# Patient Record
Sex: FEMALE | Race: BLACK OR AFRICAN AMERICAN | NOT HISPANIC OR LATINO | Employment: FULL TIME | URBAN - METROPOLITAN AREA
[De-identification: names, ages, dates, MRNs, and addresses within clinical notes are randomized per-mention and may not be internally consistent; named-entity substitution may affect disease eponyms.]

---
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
## 2018-12-06 ENCOUNTER — PRENATAL OFFICE VISIT (OUTPATIENT)
Dept: NURSING | Facility: CLINIC | Age: 18
End: 2018-12-06
Payer: COMMERCIAL

## 2018-12-06 VITALS
HEART RATE: 97 BPM | TEMPERATURE: 98 F | SYSTOLIC BLOOD PRESSURE: 130 MMHG | HEIGHT: 66 IN | DIASTOLIC BLOOD PRESSURE: 90 MMHG | BODY MASS INDEX: 47.09 KG/M2 | WEIGHT: 293 LBS

## 2018-12-06 DIAGNOSIS — Z34.90 SUPERVISION OF NORMAL PREGNANCY: Primary | ICD-10-CM

## 2018-12-06 DIAGNOSIS — Z23 NEED FOR TDAP VACCINATION: ICD-10-CM

## 2018-12-06 PROBLEM — Z59.00 HOMELESS: Status: RESOLVED | Noted: 2018-10-22 | Resolved: 2018-12-06

## 2018-12-06 PROBLEM — Z59.00 HOMELESS: Status: ACTIVE | Noted: 2018-10-22

## 2018-12-06 LAB
ABO + RH BLD: NORMAL
ABO + RH BLD: NORMAL
ALBUMIN UR-MCNC: NEGATIVE MG/DL
APPEARANCE UR: CLEAR
BILIRUB UR QL STRIP: NEGATIVE
BLD GP AB SCN SERPL QL: NORMAL
BLOOD BANK CMNT PATIENT-IMP: NORMAL
COLOR UR AUTO: YELLOW
ERYTHROCYTE [DISTWIDTH] IN BLOOD BY AUTOMATED COUNT: 14.9 % (ref 10–15)
GLUCOSE UR STRIP-MCNC: NEGATIVE MG/DL
HCT VFR BLD AUTO: 37.7 % (ref 35–47)
HGB BLD-MCNC: 12.8 G/DL (ref 11.7–15.7)
HGB UR QL STRIP: NEGATIVE
KETONES UR STRIP-MCNC: NEGATIVE MG/DL
LEUKOCYTE ESTERASE UR QL STRIP: NEGATIVE
MCH RBC QN AUTO: 26.4 PG (ref 26.5–33)
MCHC RBC AUTO-ENTMCNC: 34 G/DL (ref 31.5–36.5)
MCV RBC AUTO: 78 FL (ref 78–100)
NITRATE UR QL: NEGATIVE
PH UR STRIP: 5.5 PH (ref 5–7)
PLATELET # BLD AUTO: 277 10E9/L (ref 150–450)
RBC # BLD AUTO: 4.85 10E12/L (ref 3.8–5.2)
SOURCE: NORMAL
SP GR UR STRIP: <=1.005 (ref 1–1.03)
SPECIMEN EXP DATE BLD: NORMAL
UROBILINOGEN UR STRIP-ACNC: 0.2 EU/DL (ref 0.2–1)
WBC # BLD AUTO: 13 10E9/L (ref 4–11)

## 2018-12-06 PROCEDURE — 99207 ZZC NO CHARGE NURSE ONLY: CPT

## 2018-12-06 PROCEDURE — 87340 HEPATITIS B SURFACE AG IA: CPT | Performed by: ADVANCED PRACTICE MIDWIFE

## 2018-12-06 PROCEDURE — 87389 HIV-1 AG W/HIV-1&-2 AB AG IA: CPT | Performed by: ADVANCED PRACTICE MIDWIFE

## 2018-12-06 PROCEDURE — 86780 TREPONEMA PALLIDUM: CPT | Performed by: ADVANCED PRACTICE MIDWIFE

## 2018-12-06 PROCEDURE — 86901 BLOOD TYPING SEROLOGIC RH(D): CPT | Performed by: ADVANCED PRACTICE MIDWIFE

## 2018-12-06 PROCEDURE — 83021 HEMOGLOBIN CHROMOTOGRAPHY: CPT | Mod: 90 | Performed by: ADVANCED PRACTICE MIDWIFE

## 2018-12-06 PROCEDURE — 81003 URINALYSIS AUTO W/O SCOPE: CPT | Performed by: ADVANCED PRACTICE MIDWIFE

## 2018-12-06 PROCEDURE — 86762 RUBELLA ANTIBODY: CPT | Performed by: ADVANCED PRACTICE MIDWIFE

## 2018-12-06 PROCEDURE — 36415 COLL VENOUS BLD VENIPUNCTURE: CPT | Performed by: ADVANCED PRACTICE MIDWIFE

## 2018-12-06 PROCEDURE — 99000 SPECIMEN HANDLING OFFICE-LAB: CPT | Performed by: ADVANCED PRACTICE MIDWIFE

## 2018-12-06 PROCEDURE — 85027 COMPLETE CBC AUTOMATED: CPT | Performed by: ADVANCED PRACTICE MIDWIFE

## 2018-12-06 PROCEDURE — 86850 RBC ANTIBODY SCREEN: CPT | Performed by: ADVANCED PRACTICE MIDWIFE

## 2018-12-06 PROCEDURE — 86900 BLOOD TYPING SEROLOGIC ABO: CPT | Performed by: ADVANCED PRACTICE MIDWIFE

## 2018-12-06 PROCEDURE — 87086 URINE CULTURE/COLONY COUNT: CPT | Performed by: ADVANCED PRACTICE MIDWIFE

## 2018-12-06 RX ORDER — ALBUTEROL SULFATE 90 UG/1
2 AEROSOL, METERED RESPIRATORY (INHALATION)
COMMUNITY
Start: 2018-11-01 | End: 2022-07-14

## 2018-12-06 RX ORDER — VITAMIN A ACETATE, BETA CAROTENE, ASCORBIC ACID, CHOLECALCIFEROL, .ALPHA.-TOCOPHEROL ACETATE, DL-, THIAMINE MONONITRATE, RIBOFLAVIN, NIACINAMIDE, PYRIDOXINE HYDROCHLORIDE, FOLIC ACID, CYANOCOBALAMIN, CALCIUM CARBONATE, FERROUS FUMARATE, ZINC OXIDE, CUPRIC OXIDE 3080; 12; 120; 400; 1; 1.84; 3; 20; 22; 920; 25; 200; 27; 10; 2 [IU]/1; UG/1; MG/1; [IU]/1; MG/1; MG/1; MG/1; MG/1; MG/1; [IU]/1; MG/1; MG/1; MG/1; MG/1; MG/1
1 TABLET, FILM COATED ORAL
Status: ON HOLD | COMMUNITY
Start: 2018-10-22 | End: 2022-07-14

## 2018-12-06 RX ORDER — METRONIDAZOLE 7.5 MG/G
GEL VAGINAL
COMMUNITY
Start: 2018-11-23 | End: 2018-12-06

## 2018-12-06 RX ORDER — ACETAMINOPHEN 325 MG/1
650 TABLET ORAL
Status: ON HOLD | COMMUNITY
Start: 2018-11-01 | End: 2022-07-14

## 2018-12-06 RX ORDER — ECHINACEA PURPUREA EXTRACT 125 MG
1 TABLET ORAL
Status: ON HOLD | COMMUNITY
Start: 2018-11-01 | End: 2022-07-14

## 2018-12-06 NOTE — MR AVS SNAPSHOT
"              After Visit Summary   12/6/2018    Robinson Myers    MRN: [id number]           Patient Information     Date Of Birth          2000        Visit Information        Provider Department      12/6/2018 1:45 PM RD OB NURSE EDUCATION [facility]        Today's Diagnoses     Supervision of normal pregnancy    -  1    Need for Tdap vaccination           Follow-ups after your visit        Your next 10 appointments already scheduled     Dec 10, 2018  1:00 PM CST   New Prenatal with JARRED Alvarado CNM   [facility] [place]    [address] [place] [id number] [phone]              Who to contact     If you have questions or need follow up information about today's clinic visit or your schedule please contact [facility] directly at [phone].  Normal or non-critical lab and imaging results will be communicated to you by [employer], letter or phone within 4 business days after the clinic has received the results. If you do not hear from us within 7 days, please contact the clinic through [employer] or phone. If you have a critical or abnormal lab result, we will notify you by phone as soon as possible.  Submit refill requests through [employer] or call your pharmacy and they will forward the refill request to us. Please allow 3 business days for your refill to be completed.          Additional Information About Your Visit        MyChart Information     [employer] lets you send messages to your doctor, view your test results, renew your prescriptions, schedule appointments and more. To sign up, go to [email or web address] . Click on \"Log in\" on the left side of the screen, which will take you to the Welcome page. Then click on \"Sign up Now\" on the right side of the page.     You will be asked to enter the access code listed below, as well as some personal information. Please follow the directions to create " "your username and password.     Your access code is: [email or web address]  Expires: 3/6/2019  1:34 PM     Your access code will  in 90 days. If you need help or a new code, please call your [place] clinic or [phone].        Care EveryWhere ID     This is your Care EveryWhere ID. This could be used by other organizations to access your [place] medical records  [id number]        Your Vitals Were     Pulse Temperature Height Last Period BMI (Body Mass Index)       97 98  F (36.7  C) 5' 6\" (1.676 m) 2018 51.97 kg/m2        Blood Pressure from Last 3 Encounters:   18 130/90   10/24/17 135/82    Weight from Last 3 Encounters:   18 322 lb (146.1 kg)   10/24/17 (!) 332 lb (150.6 kg) (>99 %)*     * Growth percentiles are based on CDC 2-20 Years data.              We Performed the Following     ABO/Rh type and screen     CBC with platelets     Hepatitis B surface antigen     HGB Eval Reflex to ELP or RBC Solubility     HIV Antigen Antibody Combo     Rubella Antibody IgG Quantitative     Treponema Abs w Reflex to RPR and Titer     UA without Microscopic     Urine Culture Aerobic Bacterial          Today's Medication Changes          These changes are accurate as of 18  3:08 PM.  If you have any questions, ask your nurse or doctor.               Stop taking these medicines if you haven't already. Please contact your care team if you have questions.     metroNIDAZOLE 0.75 % vaginal gel   Commonly known as:  METROGEL                    Primary Care Provider Office Phone # Fax #    [facility] [phone]       [address]  [facility] [address]        Equal Access to Services     ROXY GARCIAS AH: Hadii refugio [place], wablancoda lumanadaha, qaybta kaalmada yunior, massiel gomez. So [facility] [phone].    ATENCIÓN: Si habla español, tiene a bennett disposición servicios gratuitos de asistencia lingüística. Llame al " [phone].    We comply with applicable federal civil rights laws and [place] laws. We do not discriminate on the basis of race, color, national origin, age, disability, sex, sexual orientation, or gender identity.            Thank you!     Thank you for choosing [facility]  for your care. Our goal is always to provide you with excellent care. Hearing back from our patients is one way we can continue to improve our services. Please take a few minutes to complete the written survey that you may receive in the mail after your visit with us. Thank you!             Your Updated Medication List - Protect others around you: Learn how to safely use, store and throw away your medicines at www.disposemymeds.org.          This list is accurate as of 12/6/18  3:08 PM.  Always use your most recent med list.                   Brand Name Dispense Instructions for use Diagnosis    acetaminophen 325 MG tablet    TYLENOL     Take 650 mg by mouth        albuterol 108 (90 Base) MCG/ACT inhaler    PROAIR HFA/PROVENTIL HFA/VENTOLIN HFA     Inhale 2 puffs into the lungs        PRENATAL PLUS 27-1 MG Tabs      Take 1 tablet by mouth        sodium chloride 0.65 % nasal spray    OCEAN     Spray 1 spray in nostril

## 2018-12-06 NOTE — PROGRESS NOTES
Important Information for Provider:     Patient presents for new ob teaching and labs, second pregnancy. TAB 2017. Ultrasound performed at [facility] 12/05/18, viable pregnancy, comparable to LMP.  Declined first trimester screening. Handouts reviewed and given.  Has NOB appointment with MARITZA 12/10/18    Caffeine intake/servings daily - 0  Calcium intake/servings daily - 3  Exercise 0 times weekly - describe ; encouraged walking. Precautions given  Sunscreen used - Yes  Seatbelts used - Yes  Guns stored in the home - No  Self Breast Exam - No  Pap test up to date -  No  Eye exam up to date -  No  Dental exam up to date -  No  Abuse: Current or Past (Physical, Sexual or Emotional) - No  Do you feel safe in your environment - Yes  Do you cope well with stress - Yes  Do you suffer from insomnia - No        Prenatal OB Questionnaire  Patient supplied answers from flow sheet for:  Prenatal OB Questionnaire.  Past Medical History  Diabetes?: No  Hypertension : No  Heart disease, mitral valve prolapse or rheumatic fever?: No  An autoimmune disease such as lupus or rheumatoid arthritis?: No  Kidney disease or urinary tract infection?: No  Epilepsy, seizures or spells?: No  Migraine headaches?: No  A stroke or loss of function or sensation?: No  Any other neurological problems?: No  Have you ever been treated for depression?: No  Are you having problems with crying spells or loss of self-esteem?: No  Have you ever required psychiatric care?: No  Have you ever had hepatitis, liver disease or jaundice?: No  Have you been treated for blood clots in your veins, deep vein thromosis, inflammation in the veins, thrombosis, phlebitis, pulmonary embolism or varicosities?: No  Have you had excessive bleeding after surgery or dental work?: No  Do you bleed more than other women after a cut or scratch?: No  Do you have a history of anemia?: No  Have you ever had thyroid problems or taken thyroid medication?: No   Do you have any endocrine  problems?: No  Have you ever been in a major accident or suffered serious trauma?: No  Within the last year, has anyone hit, slapped, kicked or otherwise hurt you?: No  In the last year, has anyone forced you to have sex when you didn't want to?: No    Past Medical History 2   Have you ever received a blood transfusion?: No  Would you refuse a blood transfusion if a doctor judged it to be medically necessary?: No   If you answered Yes, would you rather die than receive a blood transfusion?: No  If you answered Yes, is this for [facility] reasons?: No  Does anyone in your home smoke?: No  Do you use tobacco products?: No  Do you drink beer, wine or hard liquor?: No  Do you use any of the following: marijuana, speed, cocaine, heroin, hallucinogens or other drugs?: No   Is your blood type Rh negative?: No  Have you ever had abnormal antibodies in your blood?: No  Have you ever had asthma?: (!) Yes  Have you ever had tuberculosis?: No  Do you have any allergies to drugs or over-the-counter medications?: No  Allergies: Dust Mites, Aspartame, Ethanol, Venlafaxine, Hydrochloride, Sertraline: No  Have you had any breast problems?: No  Have you ever ?: No  Have you had any gynecological surgical procedures such as cervical conization, a LEEP procedure, laser treatment, cryosurgery of the cervix or a dilation and curettage, etc?: (!) Yes (TAB 2017)  Have you ever had any other surgical procedures?: No  Have you been hospitalized for a nonsurgical reason excluding normal delivery?: (!) Yes  Have you ever had any anesthetic complications?: No  Have you ever had an abnormal pap smear?: No    Past Medical History (Continued)  Do you have a history of abnormalities of the uterus?: No  Did your mother take PADMINI or any other hormones when she was pregnant with you?: No  Did it take you more than a year to become pregnant?: No  Have you ever been evaluated or treated for infertility?: No  Is there a history of medical problems  in your family, which you feel may be important to this pregnancy?: No  Do you have any other problems we have not asked about which you feel may be important to this pregnancy?: No    Symptoms since last menstrual period  Do you have any of the following symptoms: abdominal pain, blood in stools or urine, chest pain, shortness of breath, coughing or vomiting up blood, your heart racing or skipping beats, nausea and vomiting, pain on urination or vaginal discharge or bleed: No  Will the patient be 35 years old or older at the time of delivery?: No    Has the patient, baby's father or anyone in either family had:  Thalassemia (Italian, Greek, Mediterranean or  background only) and an MCV result less than 80?: No  Neural tube defect such as meningomyelocele, spina bifida or anencephaly?: No  Congenital heart defect?: No  Down's Syndrome?: No  Josep-Sachs disease (Evangelical, Cajun, Belizean-Kenyan)?: No  Sickle cell disease or trait ()?: No  Hemophilia or other inherited problems of blood?: No  Muscular dystrophy?: No  Cystic fibrosis?: No  Richard's chorea?: No  Mental retardation/autism?: No  If yes, was the person tested for fragile X?: No  Any other inherited genetic or chromosomal disorder?: No  Maternal metabolic disorder (e.g Insulin-dependent diabetes, PKU)?: No  A child with birth defects not listed above?: No  Recurrent pregnancy loss or stillbirth?: No   Has the patient had any medications/street drugs/alcohol since her last menstrual period?: No  Does the patient or baby's father have any other genetic risks?: No    Infection History   Do you object to being tested for Hepatitis B?: No  Do you object to being tested for HIV?: No   Do you feel that you are at high risk for coming in contact with the AIDS virus?: No  Have you ever been treated for tuberculosis?: No  Have you ever had a positive skin test for tuberculosis?: No  Do you live with someone who has tuberculosis?: No  Have you ever been  exposed to tuberculosis?: No  Do you have genital herpes?: No  Does your partner have genital herpes?: No  Have you had a viral illness since your last period?: No  Have you ever had gonorrhea, chlamydia, syphilis, venereal warts, trichomoniasis, pelvic inflammatory disease or any other sexually transmitted disease?: No  Do you know if you are a genital group B streptococcus carrier?: No  Have you had chicken pox/varicella?: No   Have you been vaccinated against chicken Pox?: (!) Yes  Have you had any other infectious diseases?: No      Allergies as of 12/6/2018:    Allergies as of 12/06/2018     (No Known Allergies)       Current medications are:  Current Outpatient Prescriptions   Medication Sig Dispense Refill     acetaminophen (TYLENOL) 325 MG tablet Take 650 mg by mouth       albuterol (PROAIR HFA/PROVENTIL HFA/VENTOLIN HFA) 108 (90 Base) MCG/ACT inhaler Inhale 2 puffs into the lungs       Prenatal Vit-Fe Fumarate-FA (PRENATAL PLUS) 27-1 MG TABS Take 1 tablet by mouth       sodium chloride (OCEAN) 0.65 % nasal spray Spray 1 spray in nostril       [DISCONTINUED] albuterol (PROAIR HFA/PROVENTIL HFA/VENTOLIN HFA) 108 (90 BASE) MCG/ACT Inhaler Inhale 1-2 puffs into the lungs           Early ultrasound screening tool:    Does patient have irregular periods?  No  Did patient use hormonal birth control in the three months prior to positive urine pregnancy test? No  Is the patient breastfeeding?  No  Is the patient 10 weeks or greater at time of education visit?  Yes

## 2018-12-07 LAB
BACTERIA SPEC CULT: NORMAL
HBV SURFACE AG SERPL QL IA: NONREACTIVE
HIV 1+2 AB+HIV1 P24 AG SERPL QL IA: NONREACTIVE
RUBV IGG SERPL IA-ACNC: 17 IU/ML
SPECIMEN SOURCE: NORMAL
T PALLIDUM AB SER QL: NONREACTIVE

## 2018-12-09 LAB
HGB A1 MFR BLD: 96 % (ref 95–97.9)
HGB A2 MFR BLD: 3.2 % (ref 2–3.5)
HGB C MFR BLD: 0 % (ref 0–0)
HGB E MFR BLD: 0 % (ref 0–0)
HGB F MFR BLD: 0.8 % (ref 0–2.1)
HGB FRACT BLD ELPH-IMP: NORMAL
HGB OTHER MFR BLD: 0 % (ref 0–0)
HGB S BLD QL SOLY: NORMAL
HGB S MFR BLD: 0 % (ref 0–0)
PATH INTERP BLD-IMP: NORMAL

## 2018-12-10 ENCOUNTER — PRENATAL OFFICE VISIT (OUTPATIENT)
Dept: MIDWIFE SERVICES | Facility: CLINIC | Age: 18
End: 2018-12-10
Payer: COMMERCIAL

## 2018-12-10 VITALS
SYSTOLIC BLOOD PRESSURE: 138 MMHG | BODY MASS INDEX: 52.47 KG/M2 | WEIGHT: 293 LBS | DIASTOLIC BLOOD PRESSURE: 90 MMHG | HEART RATE: 81 BPM

## 2018-12-10 DIAGNOSIS — Z33.1 PREGNANT STATE, INCIDENTAL: ICD-10-CM

## 2018-12-10 DIAGNOSIS — O10.011 HYPERTENSION IN PREGNANCY, ESSENTIAL, ANTEPARTUM, FIRST TRIMESTER: ICD-10-CM

## 2018-12-10 DIAGNOSIS — Z23 NEED FOR TDAP VACCINATION: ICD-10-CM

## 2018-12-10 DIAGNOSIS — O10.919 CHRONIC HYPERTENSION IN PREGNANCY: Primary | ICD-10-CM

## 2018-12-10 LAB
CREAT UR-MCNC: 35 MG/DL
PROT UR-MCNC: <0.05 G/L
PROT/CREAT 24H UR: NORMAL G/G CR (ref 0–0.2)

## 2018-12-10 PROCEDURE — 84156 ASSAY OF PROTEIN URINE: CPT | Performed by: ADVANCED PRACTICE MIDWIFE

## 2018-12-10 PROCEDURE — 84450 TRANSFERASE (AST) (SGOT): CPT | Performed by: ADVANCED PRACTICE MIDWIFE

## 2018-12-10 PROCEDURE — 99207 ZZC FIRST OB VISIT: CPT | Performed by: ADVANCED PRACTICE MIDWIFE

## 2018-12-10 PROCEDURE — 82565 ASSAY OF CREATININE: CPT | Performed by: ADVANCED PRACTICE MIDWIFE

## 2018-12-10 PROCEDURE — 84460 ALANINE AMINO (ALT) (SGPT): CPT | Performed by: ADVANCED PRACTICE MIDWIFE

## 2018-12-10 PROCEDURE — 36415 COLL VENOUS BLD VENIPUNCTURE: CPT | Performed by: ADVANCED PRACTICE MIDWIFE
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
## 2018-12-10 NOTE — NURSING NOTE
"Chief Complaint   Patient presents with     Prenatal Care     12+6       Initial /90   Pulse 81   Wt 147.5 kg (325 lb 1.6 oz)   LMP 2018   BMI 52.47 kg/m   Estimated body mass index is 52.47 kg/m  as calculated from the following:    Height as of 18: 1.676 m (5' 6\").    Weight as of this encounter: 147.5 kg (325 lb 1.6 oz).  BP completed using cuff size: X-large    Questioned patient about current smoking habits.  Pt. has never smoked.          The following HM Due: NONE      The following patient reported/Care Every where data was sent to:  P ABSTRACT QUALITY INITIATIVES [[id number]]        Chloe Sanchez              "

## 2018-12-11 LAB
ALT SERPL W P-5'-P-CCNC: 24 U/L (ref 0–50)
AST SERPL W P-5'-P-CCNC: 19 U/L (ref 0–45)
CREAT SERPL-MCNC: 0.83 MG/DL (ref 0.52–1.04)
GFR SERPL CREATININE-BSD FRML MDRD: 88 ML/MIN/1.7M2

## 2018-12-18 ENCOUNTER — TELEPHONE (OUTPATIENT)
Dept: MIDWIFE SERVICES | Facility: CLINIC | Age: 18
End: 2018-12-18

## 2018-12-18 NOTE — TELEPHONE ENCOUNTER
----- Message from JARRED Naranjo CNM sent at 12/17/2018  6:53 PM CST -----  Hello,   Can you call this patient and see if she is taking a daily aspirin? She should be taking a baby aspirin (81 mg) daily for her chronic hypertension. If she is not she should start today. We can send her a prescription or she can pick it up over the counter. If she has questions she can talk with the on call midwife!  Thanks! Marianna

## 2019-01-04 ENCOUNTER — TELEPHONE (OUTPATIENT)
Dept: MIDWIFE SERVICES | Facility: CLINIC | Age: 19
End: 2019-01-04

## 2019-01-04 DIAGNOSIS — Z33.1 PREGNANCY, INCIDENTAL: Primary | ICD-10-CM

## 2019-01-04 NOTE — TELEPHONE ENCOUNTER
Pt called to schedule PNV and lab appt. No future orders for early 1 hr gct and maternal quad screen per note on 12/10/18. Orders placed.   Vandana Paul RN-BSN

## 2019-01-07 ENCOUNTER — PRENATAL OFFICE VISIT (OUTPATIENT)
Dept: MIDWIFE SERVICES | Facility: CLINIC | Age: 19
End: 2019-01-07
Payer: COMMERCIAL

## 2019-01-07 VITALS
DIASTOLIC BLOOD PRESSURE: 80 MMHG | WEIGHT: 293 LBS | TEMPERATURE: 97.5 F | SYSTOLIC BLOOD PRESSURE: 136 MMHG | HEART RATE: 82 BPM | BODY MASS INDEX: 52.13 KG/M2

## 2019-01-07 DIAGNOSIS — Z3A.16 16 WEEKS GESTATION OF PREGNANCY: Primary | ICD-10-CM

## 2019-01-07 DIAGNOSIS — O10.919 CHRONIC HYPERTENSION IN PREGNANCY: ICD-10-CM

## 2019-01-07 DIAGNOSIS — E66.813 CLASS 3 SEVERE OBESITY WITH SERIOUS COMORBIDITY AND BODY MASS INDEX (BMI) OF 50.0 TO 59.9 IN ADULT, UNSPECIFIED OBESITY TYPE (H): ICD-10-CM

## 2019-01-07 DIAGNOSIS — E66.01 CLASS 3 SEVERE OBESITY WITH SERIOUS COMORBIDITY AND BODY MASS INDEX (BMI) OF 50.0 TO 59.9 IN ADULT, UNSPECIFIED OBESITY TYPE (H): ICD-10-CM

## 2019-01-07 LAB
GLUCOSE 1H P 50 G GLC PO SERPL-MCNC: 67 MG/DL (ref 60–129)
HGB BLD-MCNC: 12.3 G/DL (ref 11.7–15.7)

## 2019-01-07 PROCEDURE — 36415 COLL VENOUS BLD VENIPUNCTURE: CPT | Performed by: ADVANCED PRACTICE MIDWIFE

## 2019-01-07 PROCEDURE — 99207 ZZC PRENATAL VISIT: CPT | Performed by: ADVANCED PRACTICE MIDWIFE

## 2019-01-07 PROCEDURE — 81511 FTL CGEN ABNOR FOUR ANAL: CPT | Mod: 90 | Performed by: ADVANCED PRACTICE MIDWIFE

## 2019-01-07 PROCEDURE — 99000 SPECIMEN HANDLING OFFICE-LAB: CPT | Performed by: ADVANCED PRACTICE MIDWIFE

## 2019-01-07 PROCEDURE — 82950 GLUCOSE TEST: CPT | Performed by: ADVANCED PRACTICE MIDWIFE

## 2019-01-07 PROCEDURE — 00000218 ZZHCL STATISTIC OBHBG - HEMOGLOBIN: Performed by: ADVANCED PRACTICE MIDWIFE

## 2019-01-07 RX ORDER — ASPIRIN 81 MG/1
81 TABLET, CHEWABLE ORAL DAILY
Qty: 90 TABLET | Refills: 3 | Status: SHIPPED | OUTPATIENT
Start: 2019-01-07 | End: 2020-01-07

## 2019-01-07 ASSESSMENT — PATIENT HEALTH QUESTIONNAIRE - PHQ9: SUM OF ALL RESPONSES TO PHQ QUESTIONS 1-9: 6
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
## 2019-01-07 NOTE — LETTER
January 14, 2019      Robinson Myers  [address] [address]        Dear ,    We are writing to inform you of your test results.    Your test results fall within the expected range(s) or remain unchanged from previous results.  Please continue with current treatment plan.    Resulted Orders   Glucose tolerance gest screen 1 hour   Result Value Ref Range    Glu Gest Screen 1hr 50g 67 60 - 129 mg/dL   OB hemoglobin   Result Value Ref Range    Hemoglobin 12.3 11.7 - 15.7 g/dL   Maternal Quad Marker 2nd Trimester   Result Value Ref Range    Pt Date of Birth [birth year]     Patient Weight 325     Weight Units LBS     Due Date 1,182,019     Dating Method LMP     Last Mens Period 9,112,018     Number of Fetuses ACKERMAN     Monochorionic Twins No     Race of Mother      Diabetic at Conception No     Smoking Status No     Valproic/Carbamazepine Status No     Previous Trisomy Pregnancy No     Fam History Of NTD No     In Vitro Fertilization Egg Donor Age No     Repeat Specimen No     Alpha Feto Protein 20 ng/mL    MoM for AFP 0.82     Estriol 1.08 ng/mL    MoM uE3 1.30     hCG 2nd Trimester 17,369 IU/L    MoM hCG 2nd Trimester 0.73     Dimeric Inhibin A 133 pg/mL    MoM Dimeric Inhibin A 1.67     AFP Interpretation Screen Neg       Comment:      (Note)  INTERPRETATION: SCREEN NEGATIVE                               Neural Tube Defects (NTD)   Negative       Down syndrome (DS)          Negative       Trisomy 18 (T18)            Negative                                                               Pre-Test     Post-Test      Cutoff                                      Neural Tube Defects Risks   1:1030       < 1:85556    1:250           Down Syndrome Risks         1:1140       1:3270       1:150           Trisomy 18 Risks            1:4420       < 1:00198    1:100                                        Comments:                                                  The risk of an  open neural tube defect is less than the  screening cut-off.                                         The risk of Down syndrome is less than the screening  cut-off.                                                   The risk of trisomy 18 is less than the screening cut-off.  Test developed and characteristics determined by [facility]. See Compliance S  tatement B: [employer]      Maternal Age at Delivery 20.7 yr    Patient Weight 325.0 lbs.     Estimated Due Date SEE NOTE       Comment:      (Note)  Results for Estimated Due Date: 06-18-19       Gestational Age Calculated 16 wks, 6 days     Dating Other     Num of Fetuses Noble     Maternal Race Black     Insulin Diabetic No     Smoking Status No     Fam History of NTD No     Family History of Aneuploidy No     Specimen Iteration SEE NOTE       Comment:      (Note)  Initial sample      Electronic Enhanced Report SEE NOTE       Comment:      (Note)  Access [employer] Enhanced Report using either link below:  -Direct access:   [email or web address]  -Enter Username, Password: [email or web address]  Username: [email or web address]  Password: [email or web address]  Performed by [facility],  [address] [place] [id number] [phone]  [email or web address], Ayan Carmichael MD, Lab. Director         If you have any questions or concerns, please call the clinic at the number listed above.       Sincerely,        JARRED Naranjo CNM

## 2019-01-11 LAB
# FETUSES US: NORMAL
# FETUSES: NORMAL
AFP ADJ MOM AMN: 0.82
AFP SERPL-MCNC: 20 NG/ML
AGE - REPORTED: 20.7 YR
CURRENT SMOKER: NO
CURRENT SMOKER: NO
DIABETES STATUS PATIENT: NO
FAMILY MEMBER DISEASES HX: NO
FAMILY MEMBER DISEASES HX: NO
GA METHOD: NORMAL
GA METHOD: NORMAL
GA: NORMAL WK
HCG MOM SERPL: 0.73
HCG SERPL-ACNC: NORMAL IU/L
HX OF HEREDITARY DISORDERS: NO
IDDM PATIENT QL: NO
INHIBIN A MOM SERPL: 1.67
INHIBIN A SERPL-MCNC: 133 PG/ML
INTEGRATED SCN PATIENT-IMP: NORMAL
IVF PREGNANCY: NO
LMP START DATE: NORMAL
MONOCHORIONIC TWINS: NO
PATHOLOGY STUDY: NORMAL
PREV FETUS DEFECT: NO
SERVICE CMNT-IMP: NO
SPECIMEN DRAWN SERPL: NORMAL
U ESTRIOL MOM SERPL: 1.3
U ESTRIOL SERPL-MCNC: 1.08 NG/ML
VALPROIC/CARBAMAZEPINE STATUS: NO
WEIGHT UNITS: NORMAL

## 2019-01-18 ENCOUNTER — PRE VISIT (OUTPATIENT)
Dept: MATERNAL FETAL MEDICINE | Facility: CLINIC | Age: 19
End: 2019-01-18

## 2019-01-20 PROBLEM — Z33.1 PREGNANT STATE, INCIDENTAL: Status: ACTIVE | Noted: 2018-11-02

## 2019-01-21 ENCOUNTER — HOSPITAL ENCOUNTER (OUTPATIENT)
Dept: ULTRASOUND IMAGING | Facility: CLINIC | Age: 19
Discharge: HOME | End: 2019-01-21
Attending: OBSTETRICS & GYNECOLOGY | Admitting: OBSTETRICS & GYNECOLOGY
Payer: COMMERCIAL

## 2019-01-21 ENCOUNTER — OFFICE VISIT (OUTPATIENT)
Dept: MATERNAL FETAL MEDICINE | Facility: CLINIC | Age: 19
End: 2019-01-21
Attending: OBSTETRICS & GYNECOLOGY
Payer: COMMERCIAL

## 2019-01-21 DIAGNOSIS — O99.212 OBESITY AFFECTING PREGNANCY IN SECOND TRIMESTER: ICD-10-CM

## 2019-01-21 DIAGNOSIS — O26.90 PREGNANCY RELATED CONDITION, ANTEPARTUM: ICD-10-CM

## 2019-01-21 DIAGNOSIS — O10.919 CHRONIC HYPERTENSION IN PREGNANCY: Primary | ICD-10-CM

## 2019-01-21 PROCEDURE — 76811 OB US DETAILED SNGL FETUS: CPT

## 2019-01-21 NOTE — PROGRESS NOTES
"Please see \"Imaging\" tab under \"Chart Review\" for details of today's US.    Farnaz Jaffe, DO    "

## 2019-03-12 ENCOUNTER — TELEPHONE (OUTPATIENT)
Dept: MATERNAL FETAL MEDICINE | Facility: CLINIC | Age: 19
End: 2019-03-12

## 2019-03-12 NOTE — TELEPHONE ENCOUNTER
Writer called patient to reschedule missed 3/4/19 MFM ultrasound follow-up appointment.  Patient's sister answered and sated that patient delivered on 3/2/19 at [facility].  Referring clinic notified.    Trang Healy

## 2021-10-13 ENCOUNTER — HOSPITAL ENCOUNTER (INPATIENT)
Facility: CLINIC | Age: 21
LOS: 3 days | Discharge: HOME | End: 2021-10-16
Attending: OBSTETRICS & GYNECOLOGY | Admitting: OBSTETRICS & GYNECOLOGY
Payer: COMMERCIAL

## 2021-10-13 ENCOUNTER — ANESTHESIA EVENT (OUTPATIENT)
Dept: OBGYN | Facility: CLINIC | Age: 21
End: 2021-10-13
Payer: COMMERCIAL

## 2021-10-13 ENCOUNTER — ANESTHESIA (OUTPATIENT)
Dept: OBGYN | Facility: CLINIC | Age: 21
End: 2021-10-13
Payer: COMMERCIAL

## 2021-10-13 DIAGNOSIS — Z98.891 S/P EMERGENCY CESAREAN SECTION: Primary | ICD-10-CM

## 2021-10-13 DIAGNOSIS — S37.69XA RUPTURE OF UTERUS, INITIAL ENCOUNTER: ICD-10-CM

## 2021-10-13 LAB
ALBUMIN SERPL-MCNC: 2.5 G/DL (ref 3.4–5)
ALP SERPL-CCNC: 119 U/L (ref 40–150)
ALT SERPL W P-5'-P-CCNC: 61 U/L (ref 0–50)
ALT SERPL W P-5'-P-CCNC: 61 U/L (ref 0–50)
ANION GAP SERPL CALCULATED.3IONS-SCNC: 10 MMOL/L (ref 3–14)
APTT PPP: 26 SECONDS (ref 22–38)
AST SERPL W P-5'-P-CCNC: 45 U/L (ref 0–45)
BILIRUB SERPL-MCNC: 0.3 MG/DL (ref 0.2–1.3)
BUN SERPL-MCNC: 11 MG/DL (ref 7–30)
CALCIUM SERPL-MCNC: 8 MG/DL (ref 8.5–10.1)
CHLORIDE BLD-SCNC: 109 MMOL/L (ref 94–109)
CO2 SERPL-SCNC: 19 MMOL/L (ref 20–32)
CREAT SERPL-MCNC: 0.95 MG/DL (ref 0.52–1.04)
ERYTHROCYTE [DISTWIDTH] IN BLOOD BY AUTOMATED COUNT: 12.8 % (ref 10–15)
FIBRINOGEN PPP-MCNC: 421 MG/DL (ref 170–490)
GFR SERPL CREATININE-BSD FRML MDRD: 85 ML/MIN/1.73M2
GLUCOSE BLD-MCNC: 138 MG/DL (ref 70–99)
HCT VFR BLD AUTO: 30 % (ref 35–47)
HGB BLD-MCNC: 10.1 G/DL (ref 11.7–15.7)
HOLD SPECIMEN: NORMAL
HOLD SPECIMEN: NORMAL
INR PPP: 1.11 (ref 0.86–1.14)
MCH RBC QN AUTO: 28.1 PG (ref 26.5–33)
MCHC RBC AUTO-ENTMCNC: 33.7 G/DL (ref 31.5–36.5)
MCV RBC AUTO: 84 FL (ref 78–100)
PLATELET # BLD AUTO: 269 10E3/UL (ref 150–450)
POTASSIUM BLD-SCNC: 3.4 MMOL/L (ref 3.4–5.3)
PROT SERPL-MCNC: 6.5 G/DL (ref 6.8–8.8)
RBC # BLD AUTO: 3.59 10E6/UL (ref 3.8–5.2)
SARS-COV-2 RNA RESP QL NAA+PROBE: NEGATIVE
SODIUM SERPL-SCNC: 138 MMOL/L (ref 133–144)
WBC # BLD AUTO: 28.3 10E3/UL (ref 4–11)

## 2021-10-13 PROCEDURE — 87491 CHLMYD TRACH DNA AMP PROBE: CPT | Performed by: STUDENT IN AN ORGANIZED HEALTH CARE EDUCATION/TRAINING PROGRAM

## 2021-10-13 PROCEDURE — 59514 CESAREAN DELIVERY ONLY: CPT | Mod: GC | Performed by: OBSTETRICS & GYNECOLOGY

## 2021-10-13 PROCEDURE — 370N000017 HC ANESTHESIA TECHNICAL FEE, PER MIN: Performed by: OBSTETRICS & GYNECOLOGY

## 2021-10-13 PROCEDURE — 250N000011 HC RX IP 250 OP 636: Performed by: STUDENT IN AN ORGANIZED HEALTH CARE EDUCATION/TRAINING PROGRAM

## 2021-10-13 PROCEDURE — 84156 ASSAY OF PROTEIN URINE: CPT | Performed by: STUDENT IN AN ORGANIZED HEALTH CARE EDUCATION/TRAINING PROGRAM

## 2021-10-13 PROCEDURE — 360N000076 HC SURGERY LEVEL 3, PER MIN: Performed by: OBSTETRICS & GYNECOLOGY

## 2021-10-13 PROCEDURE — 85027 COMPLETE CBC AUTOMATED: CPT | Performed by: OBSTETRICS & GYNECOLOGY

## 2021-10-13 PROCEDURE — 85610 PROTHROMBIN TIME: CPT | Performed by: STUDENT IN AN ORGANIZED HEALTH CARE EDUCATION/TRAINING PROGRAM

## 2021-10-13 PROCEDURE — 85730 THROMBOPLASTIN TIME PARTIAL: CPT | Performed by: STUDENT IN AN ORGANIZED HEALTH CARE EDUCATION/TRAINING PROGRAM

## 2021-10-13 PROCEDURE — 271N000001 HC OR GENERAL SUPPLY NON-STERILE: Performed by: OBSTETRICS & GYNECOLOGY

## 2021-10-13 PROCEDURE — U0003 INFECTIOUS AGENT DETECTION BY NUCLEIC ACID (DNA OR RNA); SEVERE ACUTE RESPIRATORY SYNDROME CORONAVIRUS 2 (SARS-COV-2) (CORONAVIRUS DISEASE [COVID-19]), AMPLIFIED PROBE TECHNIQUE, MAKING USE OF HIGH THROUGHPUT TECHNOLOGIES AS DESCRIBED BY CMS-2020-01-R: HCPCS | Performed by: OBSTETRICS & GYNECOLOGY

## 2021-10-13 PROCEDURE — G0463 HOSPITAL OUTPT CLINIC VISIT: HCPCS

## 2021-10-13 PROCEDURE — 85384 FIBRINOGEN ACTIVITY: CPT | Performed by: STUDENT IN AN ORGANIZED HEALTH CARE EDUCATION/TRAINING PROGRAM

## 2021-10-13 PROCEDURE — 85460 HEMOGLOBIN FETAL: CPT | Performed by: STUDENT IN AN ORGANIZED HEALTH CARE EDUCATION/TRAINING PROGRAM

## 2021-10-13 PROCEDURE — 250N000009 HC RX 250: Performed by: STUDENT IN AN ORGANIZED HEALTH CARE EDUCATION/TRAINING PROGRAM

## 2021-10-13 PROCEDURE — 258N000003 HC RX IP 258 OP 636: Performed by: STUDENT IN AN ORGANIZED HEALTH CARE EDUCATION/TRAINING PROGRAM

## 2021-10-13 PROCEDURE — 36415 COLL VENOUS BLD VENIPUNCTURE: CPT | Performed by: STUDENT IN AN ORGANIZED HEALTH CARE EDUCATION/TRAINING PROGRAM

## 2021-10-13 PROCEDURE — 80307 DRUG TEST PRSMV CHEM ANLYZR: CPT | Performed by: STUDENT IN AN ORGANIZED HEALTH CARE EDUCATION/TRAINING PROGRAM

## 2021-10-13 PROCEDURE — 250N000025 HC SEVOFLURANE, PER MIN: Performed by: OBSTETRICS & GYNECOLOGY

## 2021-10-13 PROCEDURE — 82570 ASSAY OF URINE CREATININE: CPT

## 2021-10-13 PROCEDURE — 87661 TRICHOMONAS VAGINALIS AMPLIF: CPT | Performed by: STUDENT IN AN ORGANIZED HEALTH CARE EDUCATION/TRAINING PROGRAM

## 2021-10-13 PROCEDURE — 86900 BLOOD TYPING SEROLOGIC ABO: CPT | Performed by: OBSTETRICS & GYNECOLOGY

## 2021-10-13 PROCEDURE — C9290 INJ, BUPIVACAINE LIPOSOME: HCPCS | Performed by: STUDENT IN AN ORGANIZED HEALTH CARE EDUCATION/TRAINING PROGRAM

## 2021-10-13 PROCEDURE — 88307 TISSUE EXAM BY PATHOLOGIST: CPT | Mod: TC | Performed by: STUDENT IN AN ORGANIZED HEALTH CARE EDUCATION/TRAINING PROGRAM

## 2021-10-13 PROCEDURE — 120N000002 HC R&B MED SURG/OB UMMC

## 2021-10-13 PROCEDURE — 80349 CANNABINOIDS NATURAL: CPT | Performed by: STUDENT IN AN ORGANIZED HEALTH CARE EDUCATION/TRAINING PROGRAM

## 2021-10-13 PROCEDURE — 84155 ASSAY OF PROTEIN SERUM: CPT | Performed by: STUDENT IN AN ORGANIZED HEALTH CARE EDUCATION/TRAINING PROGRAM

## 2021-10-13 PROCEDURE — 710N000010 HC RECOVERY PHASE 1, LEVEL 2, PER MIN: Performed by: OBSTETRICS & GYNECOLOGY

## 2021-10-13 PROCEDURE — 999N000141 HC STATISTIC PRE-PROCEDURE NURSING ASSESSMENT: Performed by: OBSTETRICS & GYNECOLOGY

## 2021-10-13 PROCEDURE — 88307 TISSUE EXAM BY PATHOLOGIST: CPT | Mod: 26 | Performed by: PATHOLOGY

## 2021-10-13 PROCEDURE — 36415 COLL VENOUS BLD VENIPUNCTURE: CPT | Performed by: OBSTETRICS & GYNECOLOGY

## 2021-10-13 PROCEDURE — 272N000001 HC OR GENERAL SUPPLY STERILE: Performed by: OBSTETRICS & GYNECOLOGY

## 2021-10-13 PROCEDURE — 82570 ASSAY OF URINE CREATININE: CPT | Performed by: STUDENT IN AN ORGANIZED HEALTH CARE EDUCATION/TRAINING PROGRAM

## 2021-10-13 RX ORDER — SODIUM CHLORIDE, SODIUM LACTATE, POTASSIUM CHLORIDE, CALCIUM CHLORIDE 600; 310; 30; 20 MG/100ML; MG/100ML; MG/100ML; MG/100ML
INJECTION, SOLUTION INTRAVENOUS CONTINUOUS PRN
Status: DISCONTINUED | OUTPATIENT
Start: 2021-10-13 | End: 2021-10-13

## 2021-10-13 RX ORDER — NALOXONE HYDROCHLORIDE 0.4 MG/ML
0.4 INJECTION, SOLUTION INTRAMUSCULAR; INTRAVENOUS; SUBCUTANEOUS
Status: DISCONTINUED | OUTPATIENT
Start: 2021-10-13 | End: 2021-10-16 | Stop reason: HOSPADM

## 2021-10-13 RX ORDER — FENTANYL CITRATE 50 UG/ML
INJECTION, SOLUTION INTRAMUSCULAR; INTRAVENOUS PRN
Status: DISCONTINUED | OUTPATIENT
Start: 2021-10-13 | End: 2021-10-13

## 2021-10-13 RX ORDER — ONDANSETRON 2 MG/ML
INJECTION INTRAMUSCULAR; INTRAVENOUS PRN
Status: DISCONTINUED | OUTPATIENT
Start: 2021-10-13 | End: 2021-10-13

## 2021-10-13 RX ORDER — FENTANYL CITRATE 50 UG/ML
25 INJECTION, SOLUTION INTRAMUSCULAR; INTRAVENOUS EVERY 5 MIN PRN
Status: DISCONTINUED | OUTPATIENT
Start: 2021-10-13 | End: 2021-10-14 | Stop reason: HOSPADM

## 2021-10-13 RX ORDER — PROPOFOL 10 MG/ML
INJECTION, EMULSION INTRAVENOUS PRN
Status: DISCONTINUED | OUTPATIENT
Start: 2021-10-13 | End: 2021-10-13

## 2021-10-13 RX ORDER — ONDANSETRON 4 MG/1
4 TABLET, ORALLY DISINTEGRATING ORAL EVERY 30 MIN PRN
Status: DISCONTINUED | OUTPATIENT
Start: 2021-10-13 | End: 2021-10-14 | Stop reason: HOSPADM

## 2021-10-13 RX ORDER — KETOROLAC TROMETHAMINE 30 MG/ML
30 INJECTION, SOLUTION INTRAMUSCULAR; INTRAVENOUS EVERY 6 HOURS
Status: COMPLETED | OUTPATIENT
Start: 2021-10-14 | End: 2021-10-14

## 2021-10-13 RX ORDER — OXYTOCIN/0.9 % SODIUM CHLORIDE 30/500 ML
100 PLASTIC BAG, INJECTION (ML) INTRAVENOUS CONTINUOUS
Status: DISPENSED | OUTPATIENT
Start: 2021-10-13 | End: 2021-10-14

## 2021-10-13 RX ORDER — HYDROMORPHONE HCL IN WATER/PF 6 MG/30 ML
0.2 PATIENT CONTROLLED ANALGESIA SYRINGE INTRAVENOUS EVERY 5 MIN PRN
Status: DISCONTINUED | OUTPATIENT
Start: 2021-10-13 | End: 2021-10-14 | Stop reason: HOSPADM

## 2021-10-13 RX ORDER — NALOXONE HYDROCHLORIDE 0.4 MG/ML
0.2 INJECTION, SOLUTION INTRAMUSCULAR; INTRAVENOUS; SUBCUTANEOUS
Status: DISCONTINUED | OUTPATIENT
Start: 2021-10-13 | End: 2021-10-16 | Stop reason: HOSPADM

## 2021-10-13 RX ORDER — BUPIVACAINE HYDROCHLORIDE 2.5 MG/ML
INJECTION, SOLUTION EPIDURAL; INFILTRATION; INTRACAUDAL PRN
Status: DISCONTINUED | OUTPATIENT
Start: 2021-10-13 | End: 2021-10-13

## 2021-10-13 RX ORDER — ONDANSETRON 2 MG/ML
4 INJECTION INTRAMUSCULAR; INTRAVENOUS EVERY 30 MIN PRN
Status: DISCONTINUED | OUTPATIENT
Start: 2021-10-13 | End: 2021-10-14 | Stop reason: HOSPADM

## 2021-10-13 RX ORDER — OXYTOCIN/0.9 % SODIUM CHLORIDE 30/500 ML
PLASTIC BAG, INJECTION (ML) INTRAVENOUS CONTINUOUS PRN
Status: DISCONTINUED | OUTPATIENT
Start: 2021-10-13 | End: 2021-10-13

## 2021-10-13 RX ORDER — CEFAZOLIN SODIUM IN 0.9 % NACL 3 G/100 ML
INTRAVENOUS SOLUTION, PIGGYBACK (ML) INTRAVENOUS PRN
Status: DISCONTINUED | OUTPATIENT
Start: 2021-10-13 | End: 2021-10-13

## 2021-10-13 RX ORDER — SODIUM CHLORIDE, SODIUM LACTATE, POTASSIUM CHLORIDE, CALCIUM CHLORIDE 600; 310; 30; 20 MG/100ML; MG/100ML; MG/100ML; MG/100ML
INJECTION, SOLUTION INTRAVENOUS CONTINUOUS
Status: DISCONTINUED | OUTPATIENT
Start: 2021-10-13 | End: 2021-10-14 | Stop reason: HOSPADM

## 2021-10-13 RX ADMIN — HYDROMORPHONE HYDROCHLORIDE 0.2 MG: 0.2 INJECTION, SOLUTION INTRAMUSCULAR; INTRAVENOUS; SUBCUTANEOUS at 22:47

## 2021-10-13 RX ADMIN — FENTANYL CITRATE 25 MCG: 50 INJECTION INTRAMUSCULAR; INTRAVENOUS at 22:46

## 2021-10-13 RX ADMIN — ONDANSETRON 4 MG: 2 INJECTION INTRAMUSCULAR; INTRAVENOUS at 20:46

## 2021-10-13 RX ADMIN — PHENYLEPHRINE HYDROCHLORIDE 100 MCG: 10 INJECTION INTRAVENOUS at 20:49

## 2021-10-13 RX ADMIN — BUPIVACAINE 20 ML: 13.3 INJECTION, SUSPENSION, LIPOSOMAL INFILTRATION at 21:48

## 2021-10-13 RX ADMIN — PHENYLEPHRINE HYDROCHLORIDE 100 MCG: 10 INJECTION INTRAVENOUS at 21:05

## 2021-10-13 RX ADMIN — PHENYLEPHRINE HYDROCHLORIDE 100 MCG: 10 INJECTION INTRAVENOUS at 21:16

## 2021-10-13 RX ADMIN — HYDROMORPHONE HYDROCHLORIDE 1 MG: 1 INJECTION, SOLUTION INTRAMUSCULAR; INTRAVENOUS; SUBCUTANEOUS at 21:08

## 2021-10-13 RX ADMIN — PHENYLEPHRINE HYDROCHLORIDE 100 MCG: 10 INJECTION INTRAVENOUS at 21:21

## 2021-10-13 RX ADMIN — FENTANYL CITRATE 25 MCG: 50 INJECTION INTRAMUSCULAR; INTRAVENOUS at 23:08

## 2021-10-13 RX ADMIN — PHENYLEPHRINE HYDROCHLORIDE 100 MCG: 10 INJECTION INTRAVENOUS at 21:26

## 2021-10-13 RX ADMIN — PHENYLEPHRINE HYDROCHLORIDE 100 MCG: 10 INJECTION INTRAVENOUS at 21:23

## 2021-10-13 RX ADMIN — MIDAZOLAM 2 MG: 1 INJECTION INTRAMUSCULAR; INTRAVENOUS at 20:46

## 2021-10-13 RX ADMIN — KETOROLAC TROMETHAMINE 30 MG: 30 INJECTION, SOLUTION INTRAMUSCULAR at 22:55

## 2021-10-13 RX ADMIN — PROPOFOL 70 MG: 10 INJECTION, EMULSION INTRAVENOUS at 21:08

## 2021-10-13 RX ADMIN — FENTANYL CITRATE 25 MCG: 50 INJECTION INTRAMUSCULAR; INTRAVENOUS at 22:31

## 2021-10-13 RX ADMIN — PHENYLEPHRINE HYDROCHLORIDE 100 MCG: 10 INJECTION INTRAVENOUS at 20:57

## 2021-10-13 RX ADMIN — Medication 3 G: at 20:42

## 2021-10-13 RX ADMIN — FENTANYL CITRATE 100 MCG: 50 INJECTION, SOLUTION INTRAMUSCULAR; INTRAVENOUS at 20:46

## 2021-10-13 RX ADMIN — BUPIVACAINE HYDROCHLORIDE 20 ML: 2.5 INJECTION, SOLUTION EPIDURAL; INFILTRATION; INTRACAUDAL at 21:48

## 2021-10-13 RX ADMIN — Medication 200 MG: at 20:39

## 2021-10-13 RX ADMIN — PROPOFOL 400 MG: 10 INJECTION, EMULSION INTRAVENOUS at 20:39

## 2021-10-13 RX ADMIN — PHENYLEPHRINE HYDROCHLORIDE 100 MCG: 10 INJECTION INTRAVENOUS at 20:52

## 2021-10-13 RX ADMIN — PHENYLEPHRINE HYDROCHLORIDE 100 MCG: 10 INJECTION INTRAVENOUS at 20:44

## 2021-10-13 RX ADMIN — HYDROMORPHONE HYDROCHLORIDE 0.2 MG: 0.2 INJECTION, SOLUTION INTRAMUSCULAR; INTRAVENOUS; SUBCUTANEOUS at 23:11

## 2021-10-13 RX ADMIN — OXYTOCIN-SODIUM CHLORIDE 0.9% IV SOLN 30 UNIT/500ML 600 ML/HR: 30-0.9/5 SOLUTION at 20:45

## 2021-10-13 RX ADMIN — PHENYLEPHRINE HYDROCHLORIDE 100 MCG: 10 INJECTION INTRAVENOUS at 20:47

## 2021-10-13 RX ADMIN — SODIUM CHLORIDE, POTASSIUM CHLORIDE, SODIUM LACTATE AND CALCIUM CHLORIDE: 600; 310; 30; 20 INJECTION, SOLUTION INTRAVENOUS at 20:39

## 2021-10-13 RX ADMIN — Medication 100 ML/HR: at 23:04

## 2021-10-13 RX ADMIN — PHENYLEPHRINE HYDROCHLORIDE 100 MCG: 10 INJECTION INTRAVENOUS at 20:41

## 2021-10-13 RX ADMIN — PHENYLEPHRINE HYDROCHLORIDE 100 MCG: 10 INJECTION INTRAVENOUS at 20:54

## 2021-10-13 RX ADMIN — FENTANYL CITRATE 25 MCG: 50 INJECTION INTRAMUSCULAR; INTRAVENOUS at 22:36

## 2021-10-13 RX ADMIN — PHENYLEPHRINE HYDROCHLORIDE 100 MCG: 10 INJECTION INTRAVENOUS at 20:43

## 2021-10-13 RX ADMIN — PHENYLEPHRINE HYDROCHLORIDE 100 MCG: 10 INJECTION INTRAVENOUS at 21:13

## 2021-10-13 RX ADMIN — PHENYLEPHRINE HYDROCHLORIDE 100 MCG: 10 INJECTION INTRAVENOUS at 21:01

## 2021-10-13 RX ADMIN — PHENYLEPHRINE HYDROCHLORIDE 100 MCG: 10 INJECTION INTRAVENOUS at 21:09

## 2021-10-13 ASSESSMENT — ACTIVITIES OF DAILY LIVING (ADL)
FALL_HISTORY_WITHIN_LAST_SIX_MONTHS: NO
TOILETING_ISSUES: NO

## 2021-10-14 LAB
ALT SERPL W P-5'-P-CCNC: 57 U/L (ref 0–50)
ALT SERPL W P-5'-P-CCNC: 58 U/L (ref 0–50)
AMPHETAMINES UR QL SCN: ABNORMAL
APTT PPP: 23 SECONDS (ref 22–38)
AST SERPL W P-5'-P-CCNC: 52 U/L (ref 0–45)
AST SERPL W P-5'-P-CCNC: 52 U/L (ref 0–45)
C TRACH DNA SPEC QL PROBE+SIG AMP: NEGATIVE
CANNABINOIDS UR QL SCN: ABNORMAL
COCAINE UR QL: ABNORMAL
CREAT SERPL-MCNC: 0.71 MG/DL (ref 0.52–1.04)
CREAT SERPL-MCNC: 0.76 MG/DL (ref 0.52–1.04)
CREAT UR-MCNC: 275 MG/DL
CREAT UR-MCNC: 307 MG/DL
ERYTHROCYTE [DISTWIDTH] IN BLOOD BY AUTOMATED COUNT: 13 % (ref 10–15)
ERYTHROCYTE [DISTWIDTH] IN BLOOD BY AUTOMATED COUNT: 13 % (ref 10–15)
ERYTHROCYTE [DISTWIDTH] IN BLOOD BY AUTOMATED COUNT: 13.1 % (ref 10–15)
FETAL RBC % LFV: 0 %
FETAL RBC (ML): 0 ML
FIBRINOGEN PPP-MCNC: 422 MG/DL (ref 170–490)
GFR SERPL CREATININE-BSD FRML MDRD: >90 ML/MIN/1.73M2
GFR SERPL CREATININE-BSD FRML MDRD: >90 ML/MIN/1.73M2
HCT VFR BLD AUTO: 22.8 % (ref 35–47)
HCT VFR BLD AUTO: 24.9 % (ref 35–47)
HCT VFR BLD AUTO: 29.2 % (ref 35–47)
HGB BLD-MCNC: 7.6 G/DL (ref 11.7–15.7)
HGB BLD-MCNC: 8.3 G/DL (ref 11.7–15.7)
HGB BLD-MCNC: 9.7 G/DL (ref 11.7–15.7)
HOLD SPECIMEN: NORMAL
HOLD SPECIMEN: NORMAL
IF INDICATED RECOMMENDED DOSE OF RH IMMUNE GLOBULIN UG: 300 UG
INR PPP: 1.11 (ref 0.85–1.15)
MCH RBC QN AUTO: 27.6 PG (ref 26.5–33)
MCH RBC QN AUTO: 27.6 PG (ref 26.5–33)
MCH RBC QN AUTO: 27.9 PG (ref 26.5–33)
MCHC RBC AUTO-ENTMCNC: 33.2 G/DL (ref 31.5–36.5)
MCHC RBC AUTO-ENTMCNC: 33.3 G/DL (ref 31.5–36.5)
MCHC RBC AUTO-ENTMCNC: 33.3 G/DL (ref 31.5–36.5)
MCV RBC AUTO: 83 FL (ref 78–100)
MCV RBC AUTO: 83 FL (ref 78–100)
MCV RBC AUTO: 84 FL (ref 78–100)
N GONORRHOEA DNA SPEC QL NAA+PROBE: NEGATIVE
OPIATES UR QL SCN: ABNORMAL
PCP UR QL SCN: ABNORMAL
PLATELET # BLD AUTO: 161 10E3/UL (ref 150–450)
PLATELET # BLD AUTO: 165 10E3/UL (ref 150–450)
PLATELET # BLD AUTO: 196 10E3/UL (ref 150–450)
PROT UR-MCNC: 0.72 G/L
PROT/CREAT 24H UR: 0.23 G/G CR (ref 0–0.2)
RBC # BLD AUTO: 2.72 10E6/UL (ref 3.8–5.2)
RBC # BLD AUTO: 3.01 10E6/UL (ref 3.8–5.2)
RBC # BLD AUTO: 3.51 10E6/UL (ref 3.8–5.2)
T PALLIDUM AB SER QL: NONREACTIVE
T VAGINALIS DNA SPEC QL NAA+PROBE: NOT DETECTED
WBC # BLD AUTO: 12.5 10E3/UL (ref 4–11)
WBC # BLD AUTO: 12.6 10E3/UL (ref 4–11)
WBC # BLD AUTO: 18.7 10E3/UL (ref 4–11)

## 2021-10-14 PROCEDURE — 120N000002 HC R&B MED SURG/OB UMMC

## 2021-10-14 PROCEDURE — 86780 TREPONEMA PALLIDUM: CPT | Performed by: OBSTETRICS & GYNECOLOGY

## 2021-10-14 PROCEDURE — 85027 COMPLETE CBC AUTOMATED: CPT | Performed by: OBSTETRICS & GYNECOLOGY

## 2021-10-14 PROCEDURE — 250N000009 HC RX 250: Performed by: STUDENT IN AN ORGANIZED HEALTH CARE EDUCATION/TRAINING PROGRAM

## 2021-10-14 PROCEDURE — 250N000013 HC RX MED GY IP 250 OP 250 PS 637: Performed by: STUDENT IN AN ORGANIZED HEALTH CARE EDUCATION/TRAINING PROGRAM

## 2021-10-14 PROCEDURE — 258N000003 HC RX IP 258 OP 636: Performed by: STUDENT IN AN ORGANIZED HEALTH CARE EDUCATION/TRAINING PROGRAM

## 2021-10-14 PROCEDURE — 84450 TRANSFERASE (AST) (SGOT): CPT | Performed by: OBSTETRICS & GYNECOLOGY

## 2021-10-14 PROCEDURE — 84460 ALANINE AMINO (ALT) (SGPT): CPT | Performed by: STUDENT IN AN ORGANIZED HEALTH CARE EDUCATION/TRAINING PROGRAM

## 2021-10-14 PROCEDURE — 84460 ALANINE AMINO (ALT) (SGPT): CPT | Performed by: OBSTETRICS & GYNECOLOGY

## 2021-10-14 PROCEDURE — 85027 COMPLETE CBC AUTOMATED: CPT | Performed by: STUDENT IN AN ORGANIZED HEALTH CARE EDUCATION/TRAINING PROGRAM

## 2021-10-14 PROCEDURE — G0463 HOSPITAL OUTPT CLINIC VISIT: HCPCS

## 2021-10-14 PROCEDURE — 85730 THROMBOPLASTIN TIME PARTIAL: CPT | Performed by: STUDENT IN AN ORGANIZED HEALTH CARE EDUCATION/TRAINING PROGRAM

## 2021-10-14 PROCEDURE — 85014 HEMATOCRIT: CPT | Performed by: STUDENT IN AN ORGANIZED HEALTH CARE EDUCATION/TRAINING PROGRAM

## 2021-10-14 PROCEDURE — 85384 FIBRINOGEN ACTIVITY: CPT | Performed by: STUDENT IN AN ORGANIZED HEALTH CARE EDUCATION/TRAINING PROGRAM

## 2021-10-14 PROCEDURE — 36415 COLL VENOUS BLD VENIPUNCTURE: CPT | Performed by: OBSTETRICS & GYNECOLOGY

## 2021-10-14 PROCEDURE — 250N000011 HC RX IP 250 OP 636: Performed by: STUDENT IN AN ORGANIZED HEALTH CARE EDUCATION/TRAINING PROGRAM

## 2021-10-14 PROCEDURE — 82565 ASSAY OF CREATININE: CPT | Performed by: STUDENT IN AN ORGANIZED HEALTH CARE EDUCATION/TRAINING PROGRAM

## 2021-10-14 PROCEDURE — 85610 PROTHROMBIN TIME: CPT | Performed by: STUDENT IN AN ORGANIZED HEALTH CARE EDUCATION/TRAINING PROGRAM

## 2021-10-14 PROCEDURE — 84450 TRANSFERASE (AST) (SGOT): CPT | Performed by: STUDENT IN AN ORGANIZED HEALTH CARE EDUCATION/TRAINING PROGRAM

## 2021-10-14 PROCEDURE — 36416 COLLJ CAPILLARY BLOOD SPEC: CPT | Performed by: STUDENT IN AN ORGANIZED HEALTH CARE EDUCATION/TRAINING PROGRAM

## 2021-10-14 PROCEDURE — 36415 COLL VENOUS BLD VENIPUNCTURE: CPT | Performed by: STUDENT IN AN ORGANIZED HEALTH CARE EDUCATION/TRAINING PROGRAM

## 2021-10-14 PROCEDURE — 82565 ASSAY OF CREATININE: CPT | Performed by: OBSTETRICS & GYNECOLOGY

## 2021-10-14 RX ORDER — SIMETHICONE 80 MG
80 TABLET,CHEWABLE ORAL 4 TIMES DAILY
Status: DISCONTINUED | OUTPATIENT
Start: 2021-10-14 | End: 2021-10-16 | Stop reason: HOSPADM

## 2021-10-14 RX ORDER — AMOXICILLIN 250 MG
1 CAPSULE ORAL 2 TIMES DAILY
Status: DISCONTINUED | OUTPATIENT
Start: 2021-10-14 | End: 2021-10-14

## 2021-10-14 RX ORDER — MAGNESIUM SULFATE HEPTAHYDRATE 40 MG/ML
2 INJECTION, SOLUTION INTRAVENOUS
Status: DISCONTINUED | OUTPATIENT
Start: 2021-10-14 | End: 2021-10-16 | Stop reason: HOSPADM

## 2021-10-14 RX ORDER — BISACODYL 10 MG
10 SUPPOSITORY, RECTAL RECTAL DAILY PRN
Status: DISCONTINUED | OUTPATIENT
Start: 2021-10-15 | End: 2021-10-16 | Stop reason: HOSPADM

## 2021-10-14 RX ORDER — LABETALOL HYDROCHLORIDE 5 MG/ML
20-40 INJECTION, SOLUTION INTRAVENOUS EVERY 10 MIN PRN
Status: DISCONTINUED | OUTPATIENT
Start: 2021-10-14 | End: 2021-10-16 | Stop reason: HOSPADM

## 2021-10-14 RX ORDER — TRANEXAMIC ACID 10 MG/ML
1 INJECTION, SOLUTION INTRAVENOUS EVERY 30 MIN PRN
Status: DISCONTINUED | OUTPATIENT
Start: 2021-10-14 | End: 2021-10-16 | Stop reason: HOSPADM

## 2021-10-14 RX ORDER — POLYETHYLENE GLYCOL 3350 17 G/17G
17 POWDER, FOR SOLUTION ORAL DAILY
Status: DISCONTINUED | OUTPATIENT
Start: 2021-10-15 | End: 2021-10-16 | Stop reason: HOSPADM

## 2021-10-14 RX ORDER — MISOPROSTOL 200 UG/1
800 TABLET ORAL
Status: DISCONTINUED | OUTPATIENT
Start: 2021-10-14 | End: 2021-10-16 | Stop reason: HOSPADM

## 2021-10-14 RX ORDER — ONDANSETRON 4 MG/1
4 TABLET, ORALLY DISINTEGRATING ORAL EVERY 6 HOURS PRN
Status: DISCONTINUED | OUTPATIENT
Start: 2021-10-14 | End: 2021-10-16 | Stop reason: HOSPADM

## 2021-10-14 RX ORDER — OXYCODONE HYDROCHLORIDE 5 MG/1
5-10 TABLET ORAL EVERY 4 HOURS PRN
Status: DISCONTINUED | OUTPATIENT
Start: 2021-10-15 | End: 2021-10-16 | Stop reason: HOSPADM

## 2021-10-14 RX ORDER — LIDOCAINE 40 MG/G
CREAM TOPICAL
Status: DISCONTINUED | OUTPATIENT
Start: 2021-10-14 | End: 2021-10-16 | Stop reason: HOSPADM

## 2021-10-14 RX ORDER — HYDROCORTISONE 2.5 %
CREAM (GRAM) TOPICAL 3 TIMES DAILY PRN
Status: DISCONTINUED | OUTPATIENT
Start: 2021-10-14 | End: 2021-10-16 | Stop reason: HOSPADM

## 2021-10-14 RX ORDER — HYDROMORPHONE HCL IN WATER/PF 6 MG/30 ML
0.2 PATIENT CONTROLLED ANALGESIA SYRINGE INTRAVENOUS
Status: DISCONTINUED | OUTPATIENT
Start: 2021-10-14 | End: 2021-10-16 | Stop reason: HOSPADM

## 2021-10-14 RX ORDER — OXYCODONE HYDROCHLORIDE 5 MG/1
5 TABLET ORAL ONCE
Status: COMPLETED | OUTPATIENT
Start: 2021-10-14 | End: 2021-10-14

## 2021-10-14 RX ORDER — OXYTOCIN 10 [USP'U]/ML
10 INJECTION, SOLUTION INTRAMUSCULAR; INTRAVENOUS
Status: DISCONTINUED | OUTPATIENT
Start: 2021-10-14 | End: 2021-10-16 | Stop reason: HOSPADM

## 2021-10-14 RX ORDER — SODIUM CHLORIDE, SODIUM LACTATE, POTASSIUM CHLORIDE, CALCIUM CHLORIDE 600; 310; 30; 20 MG/100ML; MG/100ML; MG/100ML; MG/100ML
10-125 INJECTION, SOLUTION INTRAVENOUS CONTINUOUS
Status: DISCONTINUED | OUTPATIENT
Start: 2021-10-14 | End: 2021-10-16 | Stop reason: HOSPADM

## 2021-10-14 RX ORDER — DIPHENHYDRAMINE HYDROCHLORIDE 50 MG/ML
25 INJECTION INTRAMUSCULAR; INTRAVENOUS EVERY 6 HOURS PRN
Status: DISCONTINUED | OUTPATIENT
Start: 2021-10-14 | End: 2021-10-16 | Stop reason: HOSPADM

## 2021-10-14 RX ORDER — MODIFIED LANOLIN
OINTMENT (GRAM) TOPICAL
Status: DISCONTINUED | OUTPATIENT
Start: 2021-10-14 | End: 2021-10-16 | Stop reason: HOSPADM

## 2021-10-14 RX ORDER — DEXTROSE, SODIUM CHLORIDE, SODIUM LACTATE, POTASSIUM CHLORIDE, AND CALCIUM CHLORIDE 5; .6; .31; .03; .02 G/100ML; G/100ML; G/100ML; G/100ML; G/100ML
INJECTION, SOLUTION INTRAVENOUS CONTINUOUS
Status: DISCONTINUED | OUTPATIENT
Start: 2021-10-14 | End: 2021-10-16 | Stop reason: HOSPADM

## 2021-10-14 RX ORDER — IBUPROFEN 800 MG/1
800 TABLET, FILM COATED ORAL EVERY 6 HOURS
Status: DISCONTINUED | OUTPATIENT
Start: 2021-10-14 | End: 2021-10-16 | Stop reason: HOSPADM

## 2021-10-14 RX ORDER — MISOPROSTOL 200 UG/1
400 TABLET ORAL
Status: DISCONTINUED | OUTPATIENT
Start: 2021-10-14 | End: 2021-10-16 | Stop reason: HOSPADM

## 2021-10-14 RX ORDER — AMOXICILLIN 250 MG
2 CAPSULE ORAL 2 TIMES DAILY
Status: DISCONTINUED | OUTPATIENT
Start: 2021-10-14 | End: 2021-10-16 | Stop reason: HOSPADM

## 2021-10-14 RX ORDER — METOCLOPRAMIDE HYDROCHLORIDE 5 MG/ML
10 INJECTION INTRAMUSCULAR; INTRAVENOUS EVERY 6 HOURS PRN
Status: DISCONTINUED | OUTPATIENT
Start: 2021-10-14 | End: 2021-10-16 | Stop reason: HOSPADM

## 2021-10-14 RX ORDER — PROCHLORPERAZINE MALEATE 10 MG
10 TABLET ORAL EVERY 6 HOURS PRN
Status: DISCONTINUED | OUTPATIENT
Start: 2021-10-14 | End: 2021-10-16 | Stop reason: HOSPADM

## 2021-10-14 RX ORDER — ONDANSETRON 2 MG/ML
4 INJECTION INTRAMUSCULAR; INTRAVENOUS EVERY 6 HOURS PRN
Status: DISCONTINUED | OUTPATIENT
Start: 2021-10-14 | End: 2021-10-16 | Stop reason: HOSPADM

## 2021-10-14 RX ORDER — HYDRALAZINE HYDROCHLORIDE 20 MG/ML
5-10 INJECTION INTRAMUSCULAR; INTRAVENOUS
Status: DISCONTINUED | OUTPATIENT
Start: 2021-10-14 | End: 2021-10-16 | Stop reason: HOSPADM

## 2021-10-14 RX ORDER — ALBUTEROL SULFATE 90 UG/1
2 AEROSOL, METERED RESPIRATORY (INHALATION) EVERY 4 HOURS PRN
Status: DISCONTINUED | OUTPATIENT
Start: 2021-10-14 | End: 2021-10-16 | Stop reason: HOSPADM

## 2021-10-14 RX ORDER — OXYTOCIN/0.9 % SODIUM CHLORIDE 30/500 ML
340 PLASTIC BAG, INJECTION (ML) INTRAVENOUS CONTINUOUS PRN
Status: DISCONTINUED | OUTPATIENT
Start: 2021-10-14 | End: 2021-10-16 | Stop reason: HOSPADM

## 2021-10-14 RX ORDER — ACETAMINOPHEN 325 MG/1
975 TABLET ORAL EVERY 6 HOURS
Status: DISCONTINUED | OUTPATIENT
Start: 2021-10-14 | End: 2021-10-16 | Stop reason: HOSPADM

## 2021-10-14 RX ORDER — MAGNESIUM SULFATE HEPTAHYDRATE 40 MG/ML
4 INJECTION, SOLUTION INTRAVENOUS
Status: DISCONTINUED | OUTPATIENT
Start: 2021-10-14 | End: 2021-10-16 | Stop reason: HOSPADM

## 2021-10-14 RX ORDER — SIMETHICONE 80 MG
80 TABLET,CHEWABLE ORAL 4 TIMES DAILY PRN
Status: DISCONTINUED | OUTPATIENT
Start: 2021-10-14 | End: 2021-10-14

## 2021-10-14 RX ORDER — PROCHLORPERAZINE 25 MG
25 SUPPOSITORY, RECTAL RECTAL EVERY 12 HOURS PRN
Status: DISCONTINUED | OUTPATIENT
Start: 2021-10-14 | End: 2021-10-16 | Stop reason: HOSPADM

## 2021-10-14 RX ORDER — DIPHENHYDRAMINE HCL 25 MG
25 CAPSULE ORAL EVERY 6 HOURS PRN
Status: DISCONTINUED | OUTPATIENT
Start: 2021-10-14 | End: 2021-10-16 | Stop reason: HOSPADM

## 2021-10-14 RX ORDER — LIDOCAINE 4 G/G
1 PATCH TOPICAL
Status: DISCONTINUED | OUTPATIENT
Start: 2021-10-14 | End: 2021-10-16 | Stop reason: HOSPADM

## 2021-10-14 RX ORDER — LORAZEPAM 2 MG/ML
2 INJECTION INTRAMUSCULAR
Status: DISCONTINUED | OUTPATIENT
Start: 2021-10-14 | End: 2021-10-16 | Stop reason: HOSPADM

## 2021-10-14 RX ORDER — METOCLOPRAMIDE 10 MG/1
10 TABLET ORAL EVERY 6 HOURS PRN
Status: DISCONTINUED | OUTPATIENT
Start: 2021-10-14 | End: 2021-10-16 | Stop reason: HOSPADM

## 2021-10-14 RX ORDER — MAGNESIUM SULFATE HEPTAHYDRATE 500 MG/ML
10 INJECTION, SOLUTION INTRAMUSCULAR; INTRAVENOUS
Status: DISCONTINUED | OUTPATIENT
Start: 2021-10-14 | End: 2021-10-16 | Stop reason: HOSPADM

## 2021-10-14 RX ORDER — OXYCODONE HYDROCHLORIDE 5 MG/1
5 TABLET ORAL EVERY 4 HOURS PRN
Status: DISCONTINUED | OUTPATIENT
Start: 2021-10-14 | End: 2021-10-14

## 2021-10-14 RX ADMIN — OXYCODONE HYDROCHLORIDE 5 MG: 5 TABLET ORAL at 21:17

## 2021-10-14 RX ADMIN — ACETAMINOPHEN 975 MG: 325 TABLET, FILM COATED ORAL at 01:00

## 2021-10-14 RX ADMIN — LIDOCAINE PATCH 4% 1 PATCH: 40 PATCH TOPICAL at 22:05

## 2021-10-14 RX ADMIN — ACETAMINOPHEN 975 MG: 325 TABLET, FILM COATED ORAL at 07:42

## 2021-10-14 RX ADMIN — ALBUTEROL SULFATE 2 PUFF: 90 AEROSOL, METERED RESPIRATORY (INHALATION) at 05:56

## 2021-10-14 RX ADMIN — SODIUM CHLORIDE, POTASSIUM CHLORIDE, SODIUM LACTATE AND CALCIUM CHLORIDE 25 ML/HR: 600; 310; 30; 20 INJECTION, SOLUTION INTRAVENOUS at 02:19

## 2021-10-14 RX ADMIN — SODIUM CHLORIDE, POTASSIUM CHLORIDE, SODIUM LACTATE AND CALCIUM CHLORIDE 500 ML: 600; 310; 30; 20 INJECTION, SOLUTION INTRAVENOUS at 06:31

## 2021-10-14 RX ADMIN — OXYCODONE HYDROCHLORIDE 5 MG: 5 TABLET ORAL at 07:42

## 2021-10-14 RX ADMIN — KETOROLAC TROMETHAMINE 30 MG: 30 INJECTION, SOLUTION INTRAMUSCULAR at 15:41

## 2021-10-14 RX ADMIN — ACETAMINOPHEN 975 MG: 325 TABLET, FILM COATED ORAL at 14:00

## 2021-10-14 RX ADMIN — SIMETHICONE 80 MG: 80 TABLET, CHEWABLE ORAL at 19:45

## 2021-10-14 RX ADMIN — Medication 100 ML/HR: at 01:16

## 2021-10-14 RX ADMIN — OXYCODONE HYDROCHLORIDE 5 MG: 5 TABLET ORAL at 22:05

## 2021-10-14 RX ADMIN — HYDROMORPHONE HYDROCHLORIDE 0.2 MG: 0.2 INJECTION, SOLUTION INTRAMUSCULAR; INTRAVENOUS; SUBCUTANEOUS at 00:55

## 2021-10-14 RX ADMIN — ACETAMINOPHEN 975 MG: 325 TABLET, FILM COATED ORAL at 19:44

## 2021-10-14 RX ADMIN — ENOXAPARIN SODIUM 40 MG: 40 INJECTION SUBCUTANEOUS at 18:35

## 2021-10-14 RX ADMIN — OXYCODONE HYDROCHLORIDE 5 MG: 5 TABLET ORAL at 02:39

## 2021-10-14 RX ADMIN — OXYCODONE HYDROCHLORIDE 5 MG: 5 TABLET ORAL at 11:59

## 2021-10-14 RX ADMIN — SODIUM CHLORIDE, POTASSIUM CHLORIDE, SODIUM LACTATE AND CALCIUM CHLORIDE 500 ML: 600; 310; 30; 20 INJECTION, SOLUTION INTRAVENOUS at 15:41

## 2021-10-14 RX ADMIN — IBUPROFEN 800 MG: 800 TABLET, FILM COATED ORAL at 21:17

## 2021-10-14 RX ADMIN — DOCUSATE SODIUM AND SENNOSIDES 2 TABLET: 8.6; 5 TABLET ORAL at 19:45

## 2021-10-14 RX ADMIN — KETOROLAC TROMETHAMINE 30 MG: 30 INJECTION, SOLUTION INTRAMUSCULAR at 05:18

## 2021-10-14 RX ADMIN — OXYCODONE HYDROCHLORIDE 5 MG: 5 TABLET ORAL at 16:52

## 2021-10-14 NOTE — ANESTHESIA POSTPROCEDURE EVALUATION
Patient: Robinson Myers    Procedure: Procedure(s):   SECTION       Diagnosis:38 weeks gestation of pregnancy [Z3A.38]  Diagnosis Additional Information: No value filed.    Anesthesia Type:  General, Peripheral Nerve Block    Note:  Disposition: Outpatient   Postop Pain Control: Uneventful            Sign Out: Well controlled pain   PONV:    Neuro/Psych: Uneventful            Sign Out: Acceptable/Baseline neuro status   Airway/Respiratory: Uneventful            Sign Out: Acceptable/Baseline resp. status   CV/Hemodynamics: Uneventful            Sign Out: Acceptable CV status; No obvious hypovolemia; No obvious fluid overload   Other NRE:    DID A NON-ROUTINE EVENT OCCUR?     Event details/Postop Comments:  IV infiltrated. Vitals stable. Labs reviewed.           Last vitals:  Vitals Value Taken Time   /93 10/13/21 2215   Temp 36.3  C (97.3  F) 10/13/21 2200   Pulse 63 10/13/21 2227   Resp 16 10/13/21 2215   SpO2 100 % 10/13/21 2227   Vitals shown include unvalidated device data.    Electronically Signed By: Kody Saucedo MD  2021  10:27 PM

## 2021-10-14 NOTE — PROVIDER NOTIFICATION
10/14/21 1807   Provider Notification   Provider Name/Title Dr. Healy (G2)   Method of Notification Electronic Page   Request Evaluate-Remote   Notification Reason Other   With hemoglobin at 7.6 is it okay for me to still give patient her Lovenox injection? Thanks.

## 2021-10-14 NOTE — DISCHARGE SUMMARY
[facility] Discharge Summary    Robinson Myers MRN# [id number]   Age: 20 year old YOB: 2000     Date of Admission:  10/13/2021  Date of Discharge:  10/16/2021  Admitting Physician:  Cortney Bell MD  Discharge Physician: Dr. Thakur    Admit Dx:   - Intrauterine pregnancy at 34w0d  - Chronic hypertension  - Moderate persistent asthma  - Obesity  - Hx of DVT in pregnancy, APLS labs wnl, on lovenox intrapartum  - Hx of PE  - Short interval pregnancy   - Hx of classical  section  - Hx of postpartum sepsis and pelvic abscess  - Hx of PPROM     Discharge Dx:  - Same as above, s/p repeat low vertical  section  - Uterine rupture    Consults  - Anesthesia  - Lactation     Procedures:  - Repeat low vertical  section with double layer uterine closure via Pfannenstiel incision  - GETA  - TAP block    Admit HPI:  Robinson Myers is a 20 year old  at 34w1d by LMP c/w 10w4d US who presents today for abdominal pain. Per report from her fiance she began feeling pain in her abdomen around 4 pm this afternoon with intermittent contractions. The pain eventually became intense enough that she presented to triage.     Pregnancy notable for:   Chronic hypertension  Moderate persistent asthma  Obesity  Hx of DVT in pregnancy, APLS labs wnl, on lovenox intrapartum  Hx of PE  Short interval pregnancy   Hx of classical  section  Hx of postpartum sepsis and pelvic abscess  Hx of PPROM      Her previous pregnancies were notable for classical  section followed by a vaginal delivery in the ED. Her initial classical  section was indicated due to presentation to triage at 24w4d with advanced cervical dilation and breech presentation. Her postpartum course was complicated by postpartum sepsis and pelvic abscess requiring drain placement. Her subsequent delivery was at 33w3d delivered via vaginal delivery in the emergency  department.    Please see her admit H&P for full details of her PMH, PSH, Meds, Allergies and exam on admit.    Operative Course:  Surgery was uncomplicated. EBL from the delivery was 1360. Please see her  Section Operative Note for full details regarding her delivery.    Operative Findings:   1. Single, viable male infant at 2041 hours on 10/13/2021. Apgars of 3, 5 and 8 at one, five, 10 minutes.  Birth weight: pending.  Fetal presentation: vertex, occiput anterior. Amniotic fluid: none, port wine fluid and clot in abdomen.    2. Arterial Cord pH 6.88, base excess -17.4.    3. Placenta intact with 3 vessel cord.     4. Uterus with stellate rupture apparent on entrance to the peritoneum and placenta extruding through defect  5.  No intraabdominal adhesions.  No abdominal wall adhesions    Postoperative Course:  Her postoperative course was uncomplicate. On POD#3, she was meeting all of her postpartum goals and deemed stable for discharge. She was voiding without difficulty, tolerating a regular diet without nausea and vomiting, her pain was well controlled on oral pain medicines and her lochia was appropriate. Her hemoglobin prior to delivery was 10.1 and after delivery was 9.7, slowly down trended to 7, and increased to 7.6 after 1u pRBC. Her Rh status was positive and Rhogam was not indicated. HELLP labs were notable for initially elevated AST/ALT less than twice the upper limit of normal, but eventually downtrended to normal values. She received Lovenox for DVT/PE prophylaxis while inpatient.  Referral made to [facility] [place], although patients to follow-up for pp cares with [facility].  Also reports having home nurse who she will meet with next week.    Discharge Medications:     Review of your medicines      UNREVIEWED medicines. Ask your doctor about these medicines      Dose / Directions   acetaminophen 325 MG tablet  Commonly known as: TYLENOL      Dose: 650 mg  Take 650 mg by mouth  Refills: 0      albuterol 108 (90 Base) MCG/ACT inhaler  Commonly known as: PROAIR HFA/PROVENTIL HFA/VENTOLIN HFA      Dose: 2 puff  Inhale 2 puffs into the lungs  Refills: 0     Prenatal Plus 27-1 MG Tabs      Dose: 1 tablet  Take 1 tablet by mouth  Refills: 0     sodium chloride 0.65 % nasal spray  Commonly known as: OCEAN      Dose: 1 spray  Spray 1 spray in nostril  Refills: 0          Discharge/Disposition:  Robinson Myers was discharged to home in stable condition with the following instructions/medications:  1) Call for temperature > 100.4, bright red vaginal bleeding >1 pad an hour x 2 hours, foul smelling vaginal discharge, pain not controlled by usual oral pain meds, persistent nausea and vomiting not controlled on medications, drainage or redness from incision site  2) She declined contraception.  3) For feeding she decided to breast feed.  4) She was instructed to follow-up with her primary OB 1w for mood check, 6 weeks for a routine postpartum visit and contraceptive counseling.  5) Discharge activity:  No heavy lifting >15 lbs or strenuous activity for 6 weeks, pelvic rest for 6 weeks, no driving or operating machinery while on narcotics.  6) 6 weeks Lovenox BID postpartum     Sakina Thakur MD

## 2021-10-14 NOTE — BRIEF OP NOTE
[facility]    Brief Operative Note    Pre-operative diagnosis: 38 weeks gestation of pregnancy [Z3A.38]  Post-operative diagnosis Uterine rupture, presumed abruption, s/p RLTCS    Procedure: Procedure(s):   SECTION  Surgeon: Surgeon(s) and Role:     * Cortney Bell MD - Primary     * Nawaf Bryson MD - Resident - Assisting     * Anthony Reynolds MD - Resident - Assisting  Anesthesia: Spinal   Estimated Blood Loss: 1360 ml   IVF: 1200 liters  UOP: Pending at this time  Drains: None  Specimens:   ID Type Source Tests Collected by Time Destination   A :  Cord blood Umbilical Cord OR DOCUMENTATION ONLY Laquita Thurman RN 10/13/2021  8:41 PM    B :  Cord blood, arterial Umbilical Cord OR DOCUMENTATION ONLY Laquita Thurman RN 10/13/2021  8:41 PM    C :  Cord blood, venous Umbilical Cord OR DOCUMENTATION ONLY Laquita Thurman RN 10/13/2021  8:41 PM    D :  Placenta Placenta SEE PROVIDERS ORDERS Laquita Thurman RN 10/13/2021  8:41 PM      Findings:     1. Single, viable male infant at 2041 hours on 10/13/2021. Apgars of 3, 5 and 8 at one, five, 10 minutes.  Birth weight: pending.  Fetal presentation: vertex, occiput anterior. Amniotic fluid: clear.    2. Arterial Cord pH 7.23 with base excess 11.8.    3. Placenta intact with 3 vessel cord.     4. Uterus with stellate rupture apparent on entrance to the peritoneum  5.  No intraabdominal adhesions.  No abdominal wall adhesions  Complications: None.  Implants: * No implants in log *    Anthony Reynolds MD  OBGYN PGY-2  2021 10:07 PM

## 2021-10-14 NOTE — PROGRESS NOTES
Called urgently to OR for bradycardia upon arrival to triage. H/o prior classical c/s and now 38 weeks with contractions/abdominal pain that began tonight.    IV was inserted and then GET with rapid c/s delivery. Entering abdomen placenta seen coming through anterior uterine wall and 700 ml of clot removed from abdomen with port wine fluid c/w uterine rupture and abruption. Baby taken to NICU and cooling with pH <7.0  Pt stable and no blood products transfused.     Cortney Bell MD

## 2021-10-14 NOTE — PROVIDER NOTIFICATION
10/14/21 0130   Provider Notification   Provider Name/Title Dr. Reynolds   Method of Notification Phone   Request Evaluate-Remote   Notification Reason Vital Signs Change   Notified provider that second blood blood pressure was 156/98. Will continue with current plan of care and monitor closely. Will update provider with any changes.

## 2021-10-14 NOTE — PROVIDER NOTIFICATION
"Dr. Dominguez asked how pt was doing. Informed Dr. Dominguez of pt's low urine output and attempts to get an IV in so that we can give her a second bolus of LR. Pt is drinking a little, but not very much. Questioned how long putnam should be kept in and per Dr. Dominguez, leave putnam in place until urine output is adequate. Informed Dr. Dominguez about pt's pain that makes her \"not want to breathe,\" but SpO2 is 100% off oxygen. Plan to draw some labs.  "

## 2021-10-14 NOTE — PLAN OF CARE
Data: Robinson Myers transferred to [id number] via wheelchair at 0045. Stopped to see infant in NICU prior to transfer to unit.  Action: Receiving unit notified of transfer: Yes. Patient and family notified of room change. Report given to ROBERT Em at 0015. Belongings sent to receiving unit. Accompanied by Registered Nurse. Oriented patient to surroundings. Call light within reach. Discussed that per MD order, putnam to stay in until 1200 on POD#1.  Response: Patient tolerated transfer and is stable.

## 2021-10-14 NOTE — OP NOTE
[facility]   OPERATIVE NOTE:  SECTION     Surgery Date:  2021  Surgeon(s): Cortney Brand MD  Assistants:  Anthony Reynolds MD, PGY-2  Nawaf Bryson MD, PGY-3    Preoperative Diagnoses:  1.  at 34w1d  2. Fetal bradycardia  3. History of classical  section x1  4. Chronic hypertension   5. Moderate persistent asthma  6. Obesity  7. Hx of DVT in pregnancy, APLS labs wnl, on lovenox intrapartum  8. Hx of PE  9. Short interval pregnancy   10. Hx of classical  section  11. Hx of postpartum sepsis and pelvic abscess  12. Hx of PPROM       Postoperative diagnoses:  1.  at 34w1d, now delivered  2. Uterine rupture and abruption    Procedure performed:  section via Pfannenstiel skin incision with double layer uterine closure    Anesthesia:  GETA  Est Blood Loss (mL): 1360 mL  Fluid replacement: 1200 mL crystalloid.   Specimens:   ID Type Source Tests Collected by Time Destination   A :  Cord blood Umbilical Cord OR DOCUMENTATION ONLY Laquita Thurman RN 10/13/2021  8:41 PM     B :  Cord blood, arterial Umbilical Cord OR DOCUMENTATION ONLY Laquita Thurman RN 10/13/2021  8:41 PM     C :  Cord blood, venous Umbilical Cord OR DOCUMENTATION ONLY Laquita Thurman RN 10/13/2021  8:41 PM     D :  Placenta Placenta SEE PROVIDERS ORDERS Laquita Thurman RN 10/13/2021  8:41 PM         Complications: None      Operative findings:   1. Single, viable male infant at 2041 hours on 10/13/2021. Apgars of 3, 5 and 8 at one, five, 10 minutes.  Birth weight: pending.  Fetal presentation: vertex, occiput anterior. Amniotic fluid: none, port wine fluid and clot in abdomen.    2. Arterial Cord pH 6.88, base excess -17.4.    3. Placenta intact with 3 vessel cord.     4. Uterus with stellate rupture apparent on entrance to the peritoneum and placenta extruding through defect  5.  No intraabdominal adhesions.  No abdominal wall adhesions    Indication: Martínezrainerla SHADY Myers is a 20  year old, , who was admitted at 34w1d by LMP c/w 10w4d ultrasound for abdominal pain.  FHT taken in triage revealed fetal bradycardia with a baseline of 60 bpm. That rate was confirmed with bedside ultrasound. We recommended that we proceed to the OR for a stat  section delivery. The risks, benefits, and alternatives of  delivery were explained and the patient agreed to proceed.     Procedure details:  After obtaining verbal informed consent , the patient was taken to the operating room. An IV was placed which was difficult and under ultrasound guidance. Oseguera was placed. She received ancef prior to the skin incision. She was placed in the dorsal supine position with a leftward tilt and prepped with betadine then draped in the usual sterile fashion.     Following general anesthesia, the abdomen was entered through a transverse skin incision through her previous scar. The skin incision was made sharply and carried through the subcutaneous tissue to the fascia.  Fascia was incised in the midline and extended laterally with blunt dissection.  The muscle was  in the midline.      The peritoneum was entered bluntly and the opening extended by digital dissection with care to avoid the bladder. At this time, there was significant bleeding visible within the peritoneal cavity. An anterior stellate uterine rupture was identified and extended cranial-caudally with digital pressure. The infant's head was noted to be in the vertex OA position. It was elevated to the level of the hysterotomy and was delivered atraumatically, shoulders delivered easily thereafter. The cord was doubly clamped and cut and the infant was handed off to the waiting [facility] staff. A segment of the cord was cut and sent for cord gases.     The placenta was expressed.  The uterus was exteriorized from the abdomen and cleared of all clots and debris.  The uterus was massaged and was noted to be firm with scant bleeding from the  hysterotomy defect.  Pitocin was given through the running IV.  With vigorous massage as well as administration of pitocin, good uterine tone was achieved. The hysterotomy was repaired with 0-vicryl suture in a running locked fashion. A 2nd layer of 0-monocryl was  used to imbricate the incision and good hemostasis was achieved. A single area of bleeding to the right of midline at the hysterotomy was noted and managed with a single figure of X suture using 0 vicryl. Significant clot burden was manually removed from the abdominal cavity--700 cc. The posterior cul-de-sac was irrigated and the uterus was returned to the abdomen.      The bilateral pericolic gutters were irrigated.  The hysterotomy was again inspected and found to be hemostatic.  The abdominal wall was examined and also found to be hemostatic.  The fascia was closed with a running suture of 0-Vicryl.  Subcutaneous tissue was irrigated. Areas that were not hemostatic were controlled with cautery. The subcutaneous tissue was greater than 3cm in depth and did require reapproximation with 3-0 plain gut suture. The skin was closed with 4-0 vicryl and covered with a sterile dressing. The patient tolerated the procedure well and was taken to the recovery room in stable condition. All sponge, needle and instrument counts were correct x2.     Dr. Bell was present for the entire procedure.     Anthony Reynolds MD  OBGYN PGY-2  October 13, 2021 11:13 PM    I was present and scrubbed for entirety of the surgical case and  agree with note as edited to reflect findings.      PROMISE BELL MD

## 2021-10-14 NOTE — PROVIDER NOTIFICATION
10/14/21 0122   Provider Notification   Provider Name/Title Dr. Reynolds   Method of Notification Phone   Request Evaluate-Remote   Notification Reason Vital Signs Change   Notified provider regarding elevated /97. Provider placed orders for PRN hypertensive medications if BP remains sustained. Patient denies any headaches, visual changes or RUQ pain. Will continue to monitor closely.

## 2021-10-14 NOTE — PROVIDER NOTIFICATION
Reminder to place order for LR 500cc bolus. Also, putnam ordered to be removed at 1200. Do you want removal to wait until urine output is adequate? Thanks.       Dr. Healy in OR. Per [facility], Dr. Healy said to give LR 500cc bolus as a verbal order and keep the putnam in.

## 2021-10-14 NOTE — PROVIDER NOTIFICATION
FYI-IV infiltrated. Pt tearful and upset as this is the second time it has happened. Adamant about seeing baby in NICU first and willing to get IV placed when she returns. Will do bolus after IV placed.

## 2021-10-14 NOTE — ANESTHESIA PROCEDURE NOTES
Airway       Patient location during procedure: OR       Procedure Start/Stop Times: 10/13/2021 8:39 PM  Staff -        Anesthesiologist:  Kody Saucedo MD       CRNA: Hillary Eastman APRN CRNA       Performed By: CRNAIndications and Patient Condition       Indications for airway management: jess-procedural       Induction type:RSI       Mask difficulty assessment: 0 - not attempted    Final Airway Details       Final airway type: endotracheal airway       Successful airway: ETT - single  Endotracheal Airway Details        ETT size (mm): 7.0       Cuffed: yes       Successful intubation technique: video laryngoscopy       VL Blade Size: MAC 3       Grade View of Cords: 1       Adjucts: stylet       Position: Right       Measured from: gums/teeth       Secured at (cm): 23       Bite block used: None    Post intubation assessment        Placement verified by: capnometry, equal breath sounds and chest rise        Number of attempts at approach: 1       Secured with: silk tape       Ease of procedure: easy       Dentition: Intact

## 2021-10-14 NOTE — ANESTHESIA PREPROCEDURE EVALUATION
Anesthesia Pre-Procedure Evaluation    Patient: Robinson Myers   MRN: [id number] : 2000        Preoperative Diagnosis: 38 weeks gestation of pregnancy [Z3A.38]    Procedure : Procedure(s):   SECTION          Past Medical History:   Diagnosis Date     Asthma      HDL deficiency      Obesity       Past Surgical History:   Procedure Laterality Date     NO HISTORY OF SURGERY        No Known Allergies   Social History     Tobacco Use     Smoking status: Former Smoker     Smokeless tobacco: Never Used   Substance Use Topics     Alcohol use: No      Wt Readings from Last 1 Encounters:   19 146.5 kg (323 lb)              OUTSIDE LABS:  CBC:   Lab Results   Component Value Date    WBC 13.0 (H) 2018    WBC 12.2 (H) 10/24/2017    HGB 12.3 2019    HGB 12.8 2018    HCT 37.7 2018    HCT 34.3 (L) 10/24/2017     2018     10/24/2017     BMP:   Lab Results   Component Value Date    CR 0.83 12/10/2018     COAGS: No results found for: PTT, INR, FIBR  POC: No results found for: BGM, HCG, HCGS  HEPATIC:   Lab Results   Component Value Date    ALT 24 12/10/2018    AST 19 12/10/2018     OTHER: No results found for: PH, LACT, A1C, ALESSANDRA, PHOS, MAG, LIPASE, AMYLASE, TSH, T4, T3, CRP, SED    Anesthesia Plan    ASA Status:  4, emergent    NPO Status:  ELEVATED Aspiration Risk/Unknown    Anesthesia Type: General.     - Airway: ETT   Induction: Intravenous, Propofol.   Maintenance: Balanced.        Consents    Anesthesia Plan(s) and associated risks, benefits, and realistic alternatives discussed. Questions answered and patient/representative(s) expressed understanding.     - Discussed with:  Patient      - Extended Intubation/Ventilatory Support Discussed: No.      - Patient is DNR/DNI Status: No    Use of blood products discussed: No .     Postoperative Care    Pain management: IV analgesics, Oral pain medications, Multi-modal analgesia, Peripheral nerve block (Single Shot).    PONV prophylaxis: Ondansetron (or other 5HT-3), Dexamethasone or Solumedrol     Comments:    CODE c/s from triage. No IV access. Concern for abruption.     GEN/ETT/CMAC    Rescue TAP prior to extubation               Kody Saucedo MD
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
## 2021-10-14 NOTE — PROVIDER NOTIFICATION
10/14/21 1800   Provider Notification   Provider Name/Title Dr. Healy (G2)   Method of Notification Electronic Page   Request Evaluate-Remote   Notification Reason Status Update;Other   Pt output for the last two hours has been around 50mL/hr. Urine is looking clearer after fluid bolus. Would you like putnam to be removed? Thanks.     Per provider, okay to remove putnam. Also notified provider that hemoglobin this evening came back at 7.6.

## 2021-10-14 NOTE — PROGRESS NOTES
"Post Partum Progress Note  PPD#1    Subjective:  She states that she is resting comfortably in bed this morning. She has no complaints at this time. Pain is improving and well controlled on current medication regimen. She is tolerating PO intake. Lochia present and minimal.  Oseguera catheter is still in place. She has not passed flatus and has not had a BM. She is not yet ambulatory.  She denies headache, changes in vision, nausea/vomiting, chest pain, shortness of breath, RUQ pain, or worsening edema.  Plans to breast feed.    Objective:  Vital signs:  Temp: 97.6  F (36.4  C) Temp src: Oral BP: (!) 141/100 Pulse: 79   Resp: 24 SpO2: (P) 100 % O2 Device: Nasal cannula (placed on pt \"for comfort\" per pt request ) Oxygen Delivery: 2 LPM      Estimated body mass index is 52.13 kg/m  as calculated from the following:    Height as of 18: 1.676 m (5' 6\").    Weight as of 19: 146.5 kg (323 lb).    General: NAD, resting comfortably  CV: Regular rate and rhythm without murmurs, rubs, or gallops, well perfused.   Pulm: Normal respiratory effort, CTABL  Abd: Soft, non-tender, non-distended. Fundus is firm and 1 cm below the umbilicus.    Incision: incision is clean, dry, intact  Ext: 2+ lower extremity edema bilaterally. No calf tenderness.    Assessment/Plan:  Robinson Myers is a 20 year old  female who is POD#1 s/p RLVCS. Doing well postpartum    # cHTN  - Serial BP monitoring   - IV Antihypertensives prn for sustained severe range blood pressures (>160/>110)  - Labs: HELLP labs revealed ALT 61, otherwise wnl. UPC 0.23. Will redraw this morning  - No symptoms of PreE at this time  -BP low mild to high mild overnight. Will add anti hypertensive pending blood pressures this morning    # Postpartum:  - Encourage routine post-operative goals including ambulation and incentive spirometry  - PNC: Rh positive. Rubella immune. No intervention indicated.  - Pain: controlled on oral medications  - Heme: Hgb 10.1>EBL " 1436>9.7>AM Hgb pending.  If <10 will discharge home with iron.  - GI: continue anti-emetics and stool softeners as needed.  - : Putnam in place.  - PPX/Hx of DVT/Hx of PE: Will initiate lovenox today as long as hemoglobin is stable  - Infant: In NICU  - Feeding: Plans on breast feeding.  - BC: Declining at this time. Will reassess    # Uterine Rupture/History of classical  section  - Discussed with the patient that she will need to deliver by repeat  section between 36-37 wga if pregnant again  - Patient understands and states that she had not yet scheduled her  prior to this rupture     Anticipate discharge to home on POD#3     Anthony Reynolds MD  Obstetrics and Gyncology, PGY-2  2021 , 8:27 AM       Physician Attestation   I, Sakina Dominguez MD, personally examined and evaluated this patient.  I discussed the patient with the resident/fellow and care team, and agree with the assessment and plan of care as documented in the note of 10/14/2021     I personally reviewed vital signs, medications, labs, and exam .    Key findings: 20 year old  on POD 1 s/p STAT repeat low vertical  section for uterine rupture in the setting of  labor at 34w1d and history of classical  section. She is doing well this morning. Infant in cooling in NICU.   - chronic htn, no meds. BPs labile. LFTs mildly elevated but not 2x normal.   - history of DVT in 2016 1mo after depo provera, h/o PE in 2019 postpartum. Will recheck Hgb this afternoon and if stable, start prophylactic lovenox.   - acute blood loss anemia from surgery, greater than expected. Low urine output. VSS and abdomen soft so do not think she has ongoing bleeding. Likely under resuscitated. Will give fluid bolus and encourage PO intake. Maintain putnam for strict I and Os until urine output improves. Plan to recheck CBC this afternoon.  - discussed contraception with patient. Recommend no pregnancy for at  least 18mo. Discussed she would be a good candidate for IUD. She got a DVT while on depo provera. She has had a nexplanon in the past but states it did not work well for her.  - continue routine postpartum/postop advancements. Plan to remove putnam when urine output increases.  Sakina Dominguez MD  Date of Service (when I saw the patient): 10/14/21

## 2021-10-14 NOTE — PLAN OF CARE
Postpartum assessment WDL. Patient continues to have elevated blood pressures, patient has not had any sustained severe range blood pressures that required treatment. Patient was also complaining of shortness of breath, which resolved after using her albuterol inhaler. Oseguera catheter remains in place per orders, output is adequate. Pain managed with IV Toradol, oxycodone and tylenol. Incision appears to be healing well with no s/s infection. Uterus firm and midline. Scant lochia rubra. No breast or nipple pain; patient encouraged to pump over night, but patient declined due to being tired. Support person present at bedside. Continue with plan of care.

## 2021-10-14 NOTE — PROVIDER NOTIFICATION
10/13/21 2023   Provider Notification   Provider Name/Title Dr Reynolds and Dr Bryson   Method of Notification At Bedside     Called out for 2nd RN assistance and any available OB provider when noted FHT in 60s on application of external monitor at . Providers (Alex) arrived at bedside at  with bedside ultrasound. Confirmed via ultrasound that heart tones bradycardic and STAT  was called via room button at .

## 2021-10-14 NOTE — PROVIDER NOTIFICATION
10/14/21 0625   Provider Notification   Provider Name/Title Dr. Bryson   Method of Notification Phone   Request Evaluate-Remote   Notification Reason Status Update   Provider notified regarding urine output of approximately 30mL/hr, provider gave verbal order for 500mL IV fluid bolus over two hours to help increase urine output. RN will begin IV fluid bolus. Will continue to monitor closely and update provider with any changes.

## 2021-10-14 NOTE — PROVIDER NOTIFICATION
10/14/21 0102   Provider Notification   Provider Name/Title Dr Reynolds   Method of Notification Phone   Request Evaluate-Remote   Notification Reason Vital Signs Change     Notified provider of lab results (protein in urine) and ongoing BPs. One moderate range BP occurred in PACU (133/93) and on arrival to [facility] pt had a BP in the 150s/100s. Plan to repeat HELLP labs in AM and will continue to monitor.

## 2021-10-14 NOTE — PLAN OF CARE
Patient arrived to [facility] unit at 0035 via zoom cart with belongings. Received report from Laquita GRIGSBY RN. Infant is in NICU at this time. Patient transferred from zoom cart to hospital bed by air pal, jess cares performed. Patient changed into clean gown. Patient oriented to unit and room. Call light within patient reach; no concerns present at this time. Continue with current plan of care.

## 2021-10-14 NOTE — H&P
OB History and Physical     Zachary Myers    MRN# [id number]  YOB: 2000      HPI: Zachary Myers is a 20 year old  [id number] by LMP c/w 10w4d US who presents today for abdominal pain. Per report from her fiance she began feeling pain in her abdomen around 4 pm this afternoon with intermittent contractions. The pain eventually became intense enough that she presented to triage.    Pregnancy notable for:   Chronic hypertension  Moderate persistent asthma  Obesity  Hx of DVT in pregnancy, APLS labs wnl, on lovenox intrapartum  Hx of PE  Short interval pregnancy   Hx of classical  section  Hx of postpartum sepsis and pelvic abscess  Hx of PPROM     Her previous pregnancies were notable for classical  section followed by a vaginal delivery in the ED. Her initial classical  section was indicated due to presentation to triage at 24w4d with advanced cervical dilation and breech presentation. Her postpartum course was complicated by postpartum sepsis and pelvic abscess requiring drain placement. Her subsequent delivery was at 33w3d delivered via vaginal delivery in the emergency department.    Prenatal Labs:   Lab Results   Component Value Date    ABO B 2018    RH Pos 2018    AS Neg 2018    HEPBANG Nonreactive 2018    TREPAB Negative 10/24/2017    HGB 10.1 (L) 10/13/2021       GBS Status:   No results found for: GBS      OBHX:   OB History    Para Term  AB Living   4 3 0 2 1 3   SAB TAB Ectopic Multiple Live Births   0 0 0 0 3      # Outcome Date GA Lbr Sebastian/2nd Weight Sex Delivery Anes PTL Lv   4 Para 10/13/21    M CS-LVertical Gen N GIULIANA      Name: LOUIEMALE-ZACHARY      Apgar1: 3  Apgar5: 5   3  20 33w3d   M Vag-Spont None Y GIULIANA      Name: NURIA,BABY BOY      Apgar5: 8   2  19 24w4d  0.77 kg (1 lb 11.2 oz) F CS-Classical Spinal Y GIULIANA      Name: Estefany QUIÑONES      Apgar1: 4  Apgar5: 8   1 AB      TAB           MedicalHX:   Past Medical History:   Diagnosis Date     Asthma      HDL deficiency      Obesity        SurgicalHX:   Past Surgical History:   Procedure Laterality Date     NO HISTORY OF SURGERY         Medications:   No current facility-administered medications on file prior to encounter.  acetaminophen (TYLENOL) 325 MG tablet, Take 650 mg by mouth  albuterol (PROAIR HFA/PROVENTIL HFA/VENTOLIN HFA) 108 (90 Base) MCG/ACT inhaler, Inhale 2 puffs into the lungs  Prenatal Vit-Fe Fumarate-FA (PRENATAL PLUS) 27-1 MG TABS, Take 1 tablet by mouth  sodium chloride (OCEAN) 0.65 % nasal spray, Spray 1 spray in nostril        Allergies:  No Known Allergies    FamilyHX:  Family History   Problem Relation Age of Onset     Family History Negative Mother         per patient       SocialHX:   Social History     Socioeconomic History     Marital status: Single     Spouse name: Not on file     Number of children: Not on file     Years of education: Not on file     Highest education level: Not on file   Occupational History     Not on file   Tobacco Use     Smoking status: Former Smoker     Smokeless tobacco: Never Used   Substance and Sexual Activity     Alcohol use: No     Drug use: No     Sexual activity: Yes     Partners: Male   Other Topics Concern     Not on file   Social History Narrative     Not on file     Social Determinants of Health     Financial Resource Strain:      Difficulty of Paying Living Expenses:    Food Insecurity:      Worried About Running Out of Food in the Last Year:      Ran Out of Food in the Last Year:    Transportation Needs:      Lack of Transportation (Medical):      Lack of Transportation (Non-Medical):    Physical Activity:      Days of Exercise per Week:      Minutes of Exercise per Session:    Stress:      Feeling of Stress :    Social Connections:      Frequency of Communication with Friends and Family:      Frequency of Social Gatherings with Friends and Family:      Attends Evangelical  Services:      Active Member of Clubs or Organizations:      Attends Club or Organization Meetings:      Marital Status:    Intimate Partner Violence:      Fear of Current or Ex-Partner:      Emotionally Abused:      Physically Abused:      Sexually Abused:        ROS: 10 point ROS negative other than above    Physical Exam:  No data found.  General: AAOx3, appropriately interactive, NAD, appears generally well  CV: RRR, normal S1/S2, no m/r/g  Lungs: CTAB, non-labored breathing, no wheezes, rales, or rhonchi  Abdomen: soft, gravid, cephalic by BSUS    FHT: Baseline of 60 bpm from arrival confirmed with beside ultrasound     Labs:   Results for ZACHARY PALMA (MRN [id number]) as of 10/13/2021 21:54   10/13/2021 20:55 10/13/2021 20:55   Sodium 138    Potassium 3.4    Chloride 109    Carbon Dioxide 19 (L)    Urea Nitrogen 11    Creatinine 0.95    GFR Estimate 85    Calcium 8.0 (L)    Anion Gap 10    Albumin 2.5 (L)    Protein Total 6.5 (L)    Bilirubin Total 0.3    Alkaline Phosphatase 119    ALT 61 (H) 61 (H)   AST 45    Glucose 138 (H)    WBC 28.3 (H)    Hemoglobin 10.1 (L)    Hematocrit 30.0 (L)    Platelet Count 269    RBC Count 3.59 (L)    MCV 84    MCH 28.1    MCHC 33.7    RDW 12.8    INR 1.11    PTT 26    Fibrinogen 421        Assessment & Plan: 20 year old  at 34w1d by LMP c/w 10w4d US with fetal bradycardia.     # cHTN  - Serial BP monitoring   - IV Antihypertensives prn for sustained severe range blood pressures (>160/>110)  - Labs: HELLP labs, UPC drawn  - Diet: NPO  - PPx: avoid methergine and hematbate    # Stat  Section  Indicated due to fetal bradycardia. Prior (s) for PTL and breech presentation. Previous op notes reported no complications intraop. Will plan for a Pfannenstiel skin incision with low transverse hysterotomy  - Labs: CBC, T&S, RPR  - Anesthesia: GETA  - 2g Ancef   - PPH Meds/Ppx: Avoid methergine and hemabate for hypertension and asthma  - Diet: NPO  - PPx:  SCDs and lovenox  - Consent: Discussed risks and benefits of procedure, including but not limited to bleeding, infection, injury to surrounding organs, injury to infant, and the potential need for another surgery should some injury go unrecognized or patient were to have continued bleeding. Patient had time to ask questions and expressed understanding of procedure and associated risks. Agreed to blood transfusion if necessary. Verbal consent obtained.    # Rule Out Abruption  - Labs: PT/INR, PTT, CBC, Fibrinogen, Type and Screen, KB   - Rh pos: Rhogam not indicated     # PNC  - Rh pos, Rubella immune, GCT wnl, GBS unknown     # FWB:   Cat III tracing; cephalic by BSUS; EFW 6#  - Continuous Fetal Monitoring  - NICU for delivery   - Intrauterine resuscitative measures prn  - Proceed to OR immediately     Patient discussed with Dr. Arabella Reynolds MD  OBGYN PGY-2  October 13, 2021 10:08 PM      This H&P done after patient delivered due to patient care and need for STAT c/s. Agree with above. Bradycardia of fetus upon arrival to L&D for unknown length of time. Only verbal consent and GET.    Cortney Bell MD

## 2021-10-14 NOTE — ANESTHESIA CARE TRANSFER NOTE
Patient: Robinson Myers    Procedure: Procedure(s):   SECTION       Diagnosis: 38 weeks gestation of pregnancy [Z3A.38]  Diagnosis Additional Information: No value filed.    Anesthesia Type:   General, Peripheral Nerve Block     Note:    Oropharynx: oropharynx clear of all foreign objects  Level of Consciousness: drowsy  Oxygen Supplementation: face mask    Independent Airway: airway patency satisfactory and stable  Dentition: dentition unchanged  Vital Signs Stable: post-procedure vital signs reviewed and stable  Report to RN Given: handoff report given  Patient transferred to: Labor and Delivery    Handoff Report: Identifed the Patient, Identified the Reponsible Provider, Reviewed the pertinent medical history, Discussed the surgical course, Reviewed Intra-OP anesthesia mangement and issues during anesthesia, Set expectations for post-procedure period and Allowed opportunity for questions and acknowledgement of understanding      Vitals:  Vitals Value Taken Time   /93 10/13/21 2215   Temp 36.3  C (97.3  F) 10/13/21 2200   Pulse 61 10/13/21 2228   Resp 16 10/13/21 2215   SpO2 100 % 10/13/21 2228   Vitals shown include unvalidated device data.    Electronically Signed By: Kody Saucedo MD  2021  10:28 PM

## 2021-10-14 NOTE — PROVIDER NOTIFICATION
Putnam order says to remove POD#1 at 1200. Since we are keeping it in longer due to inadequate output, please update order when you get a chance. Thanks.    Per Dr. Keith, plan to initially discontinue putnam and do strict I&O. Question if the plan is to discontinue putnam as Dr. Dominguez recommended putnam stay in until urine output is adequate. Per Dr. Keith, keep putnam in and order for putnam will be updated. Plan for lovenox this evening.

## 2021-10-14 NOTE — PROVIDER NOTIFICATION
"   10/14/21 0520   Provider Notification   Provider Name/Title Dr. Reynolds   Method of Notification Phone   Request Evaluate-Remote   Notification Reason Other   Notified provider due to patient complaining of shortness of breath. Patient stated to RN \"I feel short of breath, it is hard to breathe\". Patient requesting her at home albuterol inhaler. Patient's oxygen saturations 100% on RA, respirations 24. Dr. Reynolds stated that he would order an albuterol inhaler for patient. Patient placed on 2L NC \"for comfort\" per patient request. Patient denies any chest pain at this time, will continue to monitor very closely due to hx of DVT/PE.   Also told provider that patient continues to have elevated blood pressures, no sustained severe range blood pressures at this time. Patient denies any headache, visual changes or RUQ pain. Plan is to repeat pre-e labs this AM. Will continue to monitor and update provider with any changes.   "

## 2021-10-14 NOTE — ANESTHESIA PROCEDURE NOTES
TAP Procedure Note    Pre-Procedure   Staff -        Anesthesiologist:  Kody Saucedo MD       Resident/Fellow: Leigh Sparrow MD       Performed By: resident       Location: pre-op       Pre-Anesthestic Checklist: patient identified, IV checked, site marked, risks and benefits discussed, informed consent, monitors and equipment checked, pre-op evaluation, at physician/surgeon's request and post-op pain management  Timeout:       Correct Patient: Yes        Correct Procedure: Yes        Correct Site: Yes        Correct Position: Yes        Correct Laterality: Yes        Site Marked: Yes  Procedure Documentation  Procedure: TAP       Diagnosis: POST OPERATIVE PAIN       Laterality: bilateral       Patient Position: supine       Patient Prep/Sterile Barriers: sterile gloves, mask       Skin prep: Chloraprep       Needle Type: short bevel       Needle Gauge: 21.        Needle Length (millimeters): 110        Ultrasound guided       1. Ultrasound was used to identify targeted nerve, plexus, vascular marker, or fascial plane and place a needle adjacent to it in real-time.       2. Ultrasound was used to visualize the spread of anesthetic in close proximity to the above referenced structure.       3. A permanent image is entered into the patient's record.    Assessment/Narrative         The placement was negative for: blood aspirated, painful injection and site bleeding       Paresthesias: No.     Bolus given via needle..        Secured via.        Insertion/Infusion Method: Single Shot       Complications: none       Injection made incrementally with aspirations every 5 mL.

## 2021-10-14 NOTE — PROVIDER NOTIFICATION
Urine output 30cc in last 2.5 hrs. Dark and concentrated. Encouraging pt to drink. Had a 500cc bolus this AM already. What do you recommend?    Per Dr. Healy, given another 500cc bolus of LR slowly due to blood pressure.  Dr. Healy notified hgb was 8.3 this AM.

## 2021-10-14 NOTE — PLAN OF CARE
VSS. Postpartum check WDL. Taking Toradol, Tylenol, and Oxycodone for pain. Reports pain at her abdominal sides making it difficult to breath. Was on 2L of oxygen. SpO2 % without O2. Dangled and stood at bedside at 0845; reported some dizziness. Hgb of 8.3 this AM; Dr. Healy notified. Tolerating clear fluids. Urine output via putnam minimal and concentrated; Dr. Healy notified and ordered 500cc bolus of LR. IV infiltrated at start of LR bolus. Pt was tearful/upset and requested to see baby in NICU. Pt willing to have another IV start after visiting baby; Dr. Healy notified. Waitinf for IV start then will do LR bolus and a dose of Toradol. Pumping and getting some drops of EBM. Significant other at bedside. Visited baby in NICU. Continue to monitor.

## 2021-10-14 NOTE — OR NURSING
Data: Pt to OB PACU at 2158 via cart. PIV infusing 20 units of pitocin without complications, putnam with clear urine to gravity, pt does complain of pain and/or nausea. PACU RN present for majority of care, OB RN present for fundal checks and obstetric support.  Interventions: IV to pump, monitors and alarms on, SCD on.  Response: Stable.  Plan: Patient instructed to notify RN for pain or nausea, routine post op cares, initiate breastfeeding/pumping as soon as patient/infant able.    Received handoff from PACU RN at 2117 and resumed full care of patient.

## 2021-10-15 LAB
ERYTHROCYTE [DISTWIDTH] IN BLOOD BY AUTOMATED COUNT: 13.2 % (ref 10–15)
HCT VFR BLD AUTO: 20.9 % (ref 35–47)
HGB BLD-MCNC: 7 G/DL (ref 11.7–15.7)
MCH RBC QN AUTO: 28.1 PG (ref 26.5–33)
MCHC RBC AUTO-ENTMCNC: 33.5 G/DL (ref 31.5–36.5)
MCV RBC AUTO: 84 FL (ref 78–100)
PLATELET # BLD AUTO: 160 10E3/UL (ref 150–450)
RBC # BLD AUTO: 2.49 10E6/UL (ref 3.8–5.2)
WBC # BLD AUTO: 12.1 10E3/UL (ref 4–11)

## 2021-10-15 PROCEDURE — 258N000003 HC RX IP 258 OP 636: Performed by: OBSTETRICS & GYNECOLOGY

## 2021-10-15 PROCEDURE — 85027 COMPLETE CBC AUTOMATED: CPT | Performed by: STUDENT IN AN ORGANIZED HEALTH CARE EDUCATION/TRAINING PROGRAM

## 2021-10-15 PROCEDURE — 250N000011 HC RX IP 250 OP 636: Performed by: STUDENT IN AN ORGANIZED HEALTH CARE EDUCATION/TRAINING PROGRAM

## 2021-10-15 PROCEDURE — 120N000002 HC R&B MED SURG/OB UMMC

## 2021-10-15 PROCEDURE — 250N000013 HC RX MED GY IP 250 OP 250 PS 637: Performed by: STUDENT IN AN ORGANIZED HEALTH CARE EDUCATION/TRAINING PROGRAM

## 2021-10-15 PROCEDURE — 36415 COLL VENOUS BLD VENIPUNCTURE: CPT | Performed by: STUDENT IN AN ORGANIZED HEALTH CARE EDUCATION/TRAINING PROGRAM

## 2021-10-15 PROCEDURE — 250N000011 HC RX IP 250 OP 636: Performed by: OBSTETRICS & GYNECOLOGY

## 2021-10-15 RX ORDER — DIPHENHYDRAMINE HYDROCHLORIDE 50 MG/ML
50 INJECTION INTRAMUSCULAR; INTRAVENOUS
Status: DISCONTINUED | OUTPATIENT
Start: 2021-10-15 | End: 2021-10-16 | Stop reason: HOSPADM

## 2021-10-15 RX ORDER — METHYLPREDNISOLONE SODIUM SUCCINATE 125 MG/2ML
125 INJECTION, POWDER, LYOPHILIZED, FOR SOLUTION INTRAMUSCULAR; INTRAVENOUS
Status: DISCONTINUED | OUTPATIENT
Start: 2021-10-15 | End: 2021-10-16 | Stop reason: HOSPADM

## 2021-10-15 RX ADMIN — ACETAMINOPHEN 975 MG: 325 TABLET, FILM COATED ORAL at 02:18

## 2021-10-15 RX ADMIN — IBUPROFEN 800 MG: 800 TABLET, FILM COATED ORAL at 23:07

## 2021-10-15 RX ADMIN — IBUPROFEN 800 MG: 800 TABLET, FILM COATED ORAL at 10:58

## 2021-10-15 RX ADMIN — OXYCODONE HYDROCHLORIDE 10 MG: 5 TABLET ORAL at 02:18

## 2021-10-15 RX ADMIN — ACETAMINOPHEN 975 MG: 325 TABLET, FILM COATED ORAL at 16:42

## 2021-10-15 RX ADMIN — OXYCODONE HYDROCHLORIDE 10 MG: 5 TABLET ORAL at 23:04

## 2021-10-15 RX ADMIN — OXYCODONE HYDROCHLORIDE 10 MG: 5 TABLET ORAL at 06:02

## 2021-10-15 RX ADMIN — ENOXAPARIN SODIUM 40 MG: 40 INJECTION SUBCUTANEOUS at 06:03

## 2021-10-15 RX ADMIN — IBUPROFEN 800 MG: 800 TABLET, FILM COATED ORAL at 04:06

## 2021-10-15 RX ADMIN — SIMETHICONE 80 MG: 80 TABLET, CHEWABLE ORAL at 02:18

## 2021-10-15 RX ADMIN — LIDOCAINE PATCH 4% 1 PATCH: 40 PATCH TOPICAL at 20:49

## 2021-10-15 RX ADMIN — IRON SUCROSE 300 MG: 20 INJECTION, SOLUTION INTRAVENOUS at 19:42

## 2021-10-15 RX ADMIN — DIPHENHYDRAMINE HYDROCHLORIDE 25 MG: 25 CAPSULE ORAL at 18:29

## 2021-10-15 RX ADMIN — DOCUSATE SODIUM AND SENNOSIDES 2 TABLET: 8.6; 5 TABLET ORAL at 20:48

## 2021-10-15 RX ADMIN — DOCUSATE SODIUM AND SENNOSIDES 2 TABLET: 8.6; 5 TABLET ORAL at 08:04

## 2021-10-15 RX ADMIN — ENOXAPARIN SODIUM 40 MG: 40 INJECTION SUBCUTANEOUS at 18:22

## 2021-10-15 RX ADMIN — IBUPROFEN 800 MG: 800 TABLET, FILM COATED ORAL at 16:42

## 2021-10-15 RX ADMIN — DIPHENHYDRAMINE HYDROCHLORIDE 25 MG: 25 CAPSULE ORAL at 04:12

## 2021-10-15 RX ADMIN — POLYETHYLENE GLYCOL 3350 17 G: 17 POWDER, FOR SOLUTION ORAL at 09:01

## 2021-10-15 RX ADMIN — OXYCODONE HYDROCHLORIDE 10 MG: 5 TABLET ORAL at 10:58

## 2021-10-15 RX ADMIN — SIMETHICONE 80 MG: 80 TABLET, CHEWABLE ORAL at 11:08

## 2021-10-15 RX ADMIN — SIMETHICONE 80 MG: 80 TABLET, CHEWABLE ORAL at 20:48

## 2021-10-15 RX ADMIN — ACETAMINOPHEN 975 MG: 325 TABLET, FILM COATED ORAL at 23:07

## 2021-10-15 RX ADMIN — ACETAMINOPHEN 975 MG: 325 TABLET, FILM COATED ORAL at 09:01

## 2021-10-15 RX ADMIN — OXYCODONE HYDROCHLORIDE 10 MG: 5 TABLET ORAL at 16:43

## 2021-10-15 RX ADMIN — SIMETHICONE 80 MG: 80 TABLET, CHEWABLE ORAL at 16:43

## 2021-10-15 RX ADMIN — DIPHENHYDRAMINE HYDROCHLORIDE 25 MG: 25 CAPSULE ORAL at 12:09

## 2021-10-15 NOTE — PLAN OF CARE
VSS. Postpartum assessment WNL. C/o incisional pain. Pain escalated after shower and missing pain medications during shower. Was able to quickly bring pain down with use of current pain regimen of tylenol, ibuprofen, and oxycodone. Simethicone and heat packs given to assist with general abdominal discomfort due to gas pain, foot massage also given. Pumping with encouragement. Pt educated that expressed milk will not be able to be given to baby at this time due to increased oxycodone doses, pt understands and ok with plan. EDS of 14. Social work visited patient. Birth certificate and ROP completed. Encouraged to watch educational videos. Fiance present and attentive.

## 2021-10-15 NOTE — PROGRESS NOTES
"Post Partum Progress Note  PPD#2    Subjective:  Patient sleeping soundly this morning. Did not arise to voice.    Objective:  Vital signs:  Temp: 97.9  F (36.6  C) Temp src: Oral BP: 118/75 Pulse: 72   Resp: 16 SpO2: 100 %   Oxygen Delivery: 2 LPM   Weight: 137.7 kg (303 lb 9.6 oz)  Estimated body mass index is 49 kg/m  as calculated from the following:    Height as of 18: 1.676 m (5' 6\").    Weight as of this encounter: 137.7 kg (303 lb 9.6 oz).    General: NAD, sleeping comfortably      Assessment/Plan:  Robinson Myers is a 20 year old  female who is POD#2 s/p RLVCS. Patient sleeping soundly this morning and did not arise to voice. Reviewed patients nursing notes, vitals, and labs from the past 24 hours. Will reassess the patient later this morning.    # cHTN  - Serial BP monitoring   - IV Antihypertensives prn for sustained severe range blood pressures (>160/>110). Last high reading was 10/14 at 0831 has been normotensive since this time.  - Labs: HELLP labs revealed ALT 61>57 (on 10/14 repeat), otherwise wnl. UPC 0.23    # Postpartum:  - Encourage routine post-operative goals including ambulation and incentive spirometry  - PNC: Rh positive. Rubella immune. No intervention indicated.  - Pain: controlled on oral medications  - Heme: Hgb 10.1>EBL 1436>9.7>7.6.  If <10 will discharge home with iron.  - GI: continue anti-emetics and stool softeners as needed.  - : Oseguera in place.  - PPX/Hx of DVT/Hx of PE: Will continue with Lovenox bid, repeat Hgb pending for this morning.  - Infant: In NICU  - Feeding: Plans on breast feeding.  - BC: Declining at this time. Need to reassess prior to discharge     # Uterine Rupture/History of classical  section  - Discussed with the patient that she will need to deliver by repeat  section between 36-37 wga if pregnant again  - Patient understands and states that she had not yet scheduled her  prior to this rupture     Anticipate discharge " to home on POD#3     Lakesha Zamarripa DO, MS  Obstetrics, Gynecology & Women's Health   Resident, PGY-1  10/15/2021 7:35 AM    Attestation:   This patient was seen and evaluated by me, separately from the house staff team. I have reviewed the note/plan above and agree.     Discussed surgery with pt and spouse, questions answered. Discussed good birthcontrol options like nexplanon, IUD and depo--she will decide. Reviewed really cannot get pregnant again so quickly or might die. She and FOB understand.  Will do IV Fe x1 dose now. Discharge plan either POD #3-4.    Cortney Bell MD

## 2021-10-15 NOTE — PLAN OF CARE
Data: Vital signs within normal limits. Postpartum checks within normal limits - see flow record. Patient eating and drinking normally. Patient able to empty bladder independently and is up ambulating with stand-by assistance. No apparent signs of infection. PIV saline locked. C/S dressing is clean, dry, and intact. Patient performing self cares and is breast pumping for tripp Chen in the NICU.  Action: Pain has been adequately managed with oral medications as well as lidocaine patch. Abdominal binder is on for additional comfort and support.   Response: Patient at times becomes overwhelmed and teary with ambulation secondary to pain, using therapeutic communication as well as working on adequate pain managagement. Offered to bring patient to NICU to visit baby, patient tired this evening and is wanting to rest before visiting baby. Discussed with patient that she can call NICU for an update anytime, number for NICU is on whiteboard in room. Support person, significant other: Darrick, present and attentive to patient.   Plan: Continue with the plan of care.

## 2021-10-15 NOTE — PLAN OF CARE
Data: Vital signs within normal limits. Postpartum checks within normal limits - see flow record. Patient eating and drinking normally. Patient able to empty bladder independently and is up ambulating. No apparent signs of infection. Incision healing well. Patient performing self cares and is able to visit infant in NICU.  Action: Patient medicated during the shift for pain. See MAR. Patient reassessed within 1 hour after each medication and pain was improved - patient stated she was comfortable. Patient education done about rest, pumping, et analgesics. See flow record.  Response: Positive attachment behaviors observed with infant. Support persons are present.   Plan: Anticipate discharge on 10/16/2021.
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
## 2021-10-15 NOTE — LACTATION NOTE
"D:  I met with Robinson on [facility], Gustavo is her 3rd baby.  She has 2 older children, one born at 24 weeks whom she pumped milk for 2 months and a 33 weeker whom she  for 4 months. She has a diagnosis of asthma for which she takes albuterol (Hale L1) and flovent (Hale L3-no data-probably compatible). Per [facility] RN and chart, mom is on higher than typical post-partum dosing of oxycodone due to increased pain. I called [facility], who advised not using breastmilk for infant until total daily dose of mom's oxycodone is below 40mg; at 40mg daily total dose she can take oxycodone immediately after pumping, so majority of oxycodone has metabolized by next pumping in 3 hours; this milk should be mixed 1:1 with donor milk. Oxycodone daily dose totals at 30mg and below are rated \"Hale L3-limited data-probably compatible\" and don't require spacing out pumping times based on medication times and can be given without mixing 1:1 with donor milk. In our conversation, mom hopes to space out oxycodone if possible; encouraged her to remember to take care of herself first, use pain medications as needed. Robinson has no history of breast/chest surgery or trauma.  She has already started to pump, but has been discouraged she is not expressing milk yet, becoming tearful as she talked about not getting milk yet. We talked about possibility of not being able to use her milk until she is at medication doses compatible with breastfeeding; she was already aware of this from conversations with her bedside RN and NICU team. As we talked she pumped, getting a small puddle; we celebrated her milk starting to come in, and reviewed establishing supply even if unable to use milk currently.   I:  I gave her a folder of introductory materials and went over pumping guidelines.  I reviewed physiology of colostrum and milk production, pumping guidelines, and I gave her a log and encouraged her to use it.   I explained how to access the videos " "\"Hand Expression\" and \"Maximizing Milk Production\"; as well as other helpful books and websites.   We discussed hands-on pumping techniques and usefulness of a hands-free pumping bra.  We discussed skin to skin holding and how to reach your breastfeeding goals.  We talked about birth control and other medications during breastfeeding.  She verbalized understanding via teachback.  I advised her to call her insurance company about pump coverage.    A:  Mom has information she needs to initiate her supply. Review of medications and discussion on recommendations.   P: Will continue to provide lactation support.    ESTELLE Pillai, RN, IBCLC            "

## 2021-10-16 ENCOUNTER — LACTATION ENCOUNTER (OUTPATIENT)
Age: 21
End: 2021-10-16

## 2021-10-16 VITALS
SYSTOLIC BLOOD PRESSURE: 139 MMHG | WEIGHT: 293 LBS | RESPIRATION RATE: 20 BRPM | BODY MASS INDEX: 48.79 KG/M2 | DIASTOLIC BLOOD PRESSURE: 73 MMHG | TEMPERATURE: 97.3 F | OXYGEN SATURATION: 100 % | HEART RATE: 73 BPM

## 2021-10-16 LAB — HGB BLD-MCNC: 7.6 G/DL (ref 11.7–15.7)

## 2021-10-16 PROCEDURE — 250N000011 HC RX IP 250 OP 636: Performed by: STUDENT IN AN ORGANIZED HEALTH CARE EDUCATION/TRAINING PROGRAM

## 2021-10-16 PROCEDURE — 85018 HEMOGLOBIN: CPT | Performed by: STUDENT IN AN ORGANIZED HEALTH CARE EDUCATION/TRAINING PROGRAM

## 2021-10-16 PROCEDURE — 36415 COLL VENOUS BLD VENIPUNCTURE: CPT | Performed by: STUDENT IN AN ORGANIZED HEALTH CARE EDUCATION/TRAINING PROGRAM

## 2021-10-16 PROCEDURE — 250N000013 HC RX MED GY IP 250 OP 250 PS 637: Performed by: STUDENT IN AN ORGANIZED HEALTH CARE EDUCATION/TRAINING PROGRAM

## 2021-10-16 RX ORDER — IBUPROFEN 600 MG/1
600 TABLET, FILM COATED ORAL EVERY 6 HOURS PRN
Qty: 60 TABLET | Refills: 0 | Status: ON HOLD | OUTPATIENT
Start: 2021-10-16 | End: 2022-07-14

## 2021-10-16 RX ORDER — ACETAMINOPHEN 325 MG/1
650 TABLET ORAL EVERY 6 HOURS PRN
Qty: 100 TABLET | Refills: 0 | Status: ON HOLD | OUTPATIENT
Start: 2021-10-16 | End: 2022-07-14

## 2021-10-16 RX ORDER — OXYCODONE HYDROCHLORIDE 5 MG/1
5-10 TABLET ORAL EVERY 6 HOURS PRN
Qty: 18 TABLET | Refills: 0 | Status: ON HOLD | OUTPATIENT
Start: 2021-10-16 | End: 2022-07-14

## 2021-10-16 RX ORDER — AMOXICILLIN 250 MG
1 CAPSULE ORAL DAILY
Qty: 100 TABLET | Refills: 0 | Status: ON HOLD | OUTPATIENT
Start: 2021-10-16 | End: 2022-07-14

## 2021-10-16 RX ADMIN — OXYCODONE HYDROCHLORIDE 10 MG: 5 TABLET ORAL at 10:17

## 2021-10-16 RX ADMIN — ENOXAPARIN SODIUM 40 MG: 40 INJECTION SUBCUTANEOUS at 05:01

## 2021-10-16 RX ADMIN — POLYETHYLENE GLYCOL 3350 17 G: 17 POWDER, FOR SOLUTION ORAL at 10:17

## 2021-10-16 RX ADMIN — SIMETHICONE 80 MG: 80 TABLET, CHEWABLE ORAL at 10:18

## 2021-10-16 RX ADMIN — IBUPROFEN 800 MG: 800 TABLET, FILM COATED ORAL at 10:18

## 2021-10-16 RX ADMIN — OXYCODONE HYDROCHLORIDE 10 MG: 5 TABLET ORAL at 04:57

## 2021-10-16 RX ADMIN — DOCUSATE SODIUM AND SENNOSIDES 2 TABLET: 8.6; 5 TABLET ORAL at 10:17

## 2021-10-16 RX ADMIN — SIMETHICONE 80 MG: 80 TABLET, CHEWABLE ORAL at 04:57

## 2021-10-16 RX ADMIN — ACETAMINOPHEN 975 MG: 325 TABLET, FILM COATED ORAL at 10:18

## 2021-10-16 RX ADMIN — ACETAMINOPHEN 975 MG: 325 TABLET, FILM COATED ORAL at 04:57

## 2021-10-16 RX ADMIN — IBUPROFEN 800 MG: 800 TABLET, FILM COATED ORAL at 04:57

## 2021-10-16 NOTE — CONSULTS
Writer acknowledges SW consult placed for NICU admission of infant. Per chart review, assigned Karla PARIS conducted psychosocial assessment on 10/15 and a thorough note was included. Urine toxicology confirmation on baby is still pending, preliminary positive for THC. Karla attempted to make report on 10/15, but [facility] would not accept until confirmatory results have returned.  Of note, cord blood and meconium were not collected on baby.    Assigned Karla PARIS, to follow up when she returns to the office next week. Writer clearing consult. On-call/covering SW are available if additional needs arise.    Tracie Sabillon, [facility]  On-call   [facility]  After Hours SW Pager: [phone]    ADD 2:30PM - Writer spoke with charge RN, who reports that pt has been very tearful this morning and staff are concerned about her coping.  Writer and SW trainee, Yessica, met with pt at her bedside.  She was tearful and soft-spoken, stating that her primary concern is the anticipation of being away from her baby.  Robinson has two young children at home (ages 1 & 2) and feels very strongly that she just wants to be here with Gustavo until he is able to come home. Writer offered validation of emotion, supported her in validating her maternal instinct to want to be near her baby. Robinson stated that she did not feel bad about herSELF (she scored a 14 on EPDS with a 0 for question 10), but states that she just wants her baby home. Writer encouraged her to advocate for a private room, as baby is now on room air and feeding orally.  Another male visitor (FOB?) was in the room during our conversation, who did not engage, but Mom did not seem concerned that he was present during our conversation.    Writer providing handoff to assigned Karla PARIS, who returns Tuesday 10/19 and will be available for ongoing supportive intervention.

## 2021-10-16 NOTE — PROGRESS NOTES
SPIRITUAL HEALTH SERVICES  [facility] [address] - [facility]  ON-CALL VISIT     REFERRAL SOURCE: On-call  visit with pt and spouse, per request for hospital  visit as noted in consult order.     Pt/family are requesting visit from [facility]azdan[facility] Imam. I let them know I would direct their request to Chaplain Franklin.     PLAN: will refer to heather Horton for follow-up.     Harris Mcdonald M.Div. (Bill), [facility]  Staff   Pager [phone]     * [facility] remains available 24/7 for emergent requests/referrals, either by having the switchboard page the on-call  or by entering an ASAP/STAT consult in Epic (this will also page the on-call ). Routine Epic consults receive an initial response within 24 hours.*

## 2021-10-16 NOTE — PROGRESS NOTES
OB Postpartum Progress Note  POD#3 s/p STAT RLVCS for uterine rupture    S: Feeling fine this morning. Pain well controlled. Tolerating regular diet without nausea or emesis. Voiding spontaneously without difficulty. Passing flatus, no BM. Ambulating without dizziness or lightheadedness. Lochia normal. Denies fevers, chills, headache, vision changes, CP, SOB, RUQ pain, increased edema, heavy bleeding.    O:  Patient Vitals for the past 24 hrs:   BP Temp Temp src Pulse Resp SpO2 Weight   10/16/21 0842 139/73 97.3  F (36.3  C) Oral 73 20 -- --   10/16/21 0506 -- -- -- -- -- -- 137.1 kg (302 lb 4.8 oz)   10/16/21 0455 132/77 98.2  F (36.8  C) Oral 89 18 100 % --   10/15/21 2127 135/82 -- -- 104 20 100 % --   10/15/21 2115 (!) 140/94 98  F (36.7  C) Oral 119 20 100 % --   10/15/21 2045 (!) 143/84 98.6  F (37  C) Oral 111 18 100 % --   10/15/21 2030 (!) 146/85 98.6  F (37  C) Oral 120 18 99 % --   10/15/21 2015 132/79 98.7  F (37.1  C) Oral 104 18 99 % --   10/15/21 2000 139/84 98.6  F (37  C) Oral 99 18 99 % --   10/15/21 1955 (!) 133/90 98.6  F (37  C) Oral 112 18 100 % --   10/15/21 1616 108/78 98.5  F (36.9  C) Oral 101 20 -- --       Gen: NAD  CV: Regular rate  Resp: Non-labored breathing on room air  Abd: Soft, obese, nondistended, appropriately tender, fundus firm below the umbilicus  Inc: C/D/I Pfannenstiel incision without surrounding erythema or induration   Ext: Trace lower extremity edema bilaterally, calves non-tender    I/O last 3 completed shifts:  In: 2500 [P.O.:2500]  Out: 3500 [Urine:3500]    Vitals:    10/14/21 0831 10/15/21 0212 10/16/21 0506   Weight: 136.6 kg (301 lb 2.4 oz) 137.7 kg (303 lb 9.6 oz) 137.1 kg (302 lb 4.8 oz)     Labs: AM hemoglobin pending   Hgb 10.1 > QBL 1360 > 9.7 > 8.3 > 7.6 > 7.0    A/P: Robinson CARUSO Myers is a 21 year old  who is POD#3 s/p STAT RLVCS for uterine rupture. Recovering appropriately post-op.     # Postpartum/Postop  - Pain: Continue scheduled acetaminophen,  ibuprofen, and prn oxycodone  - Heme: Postpartum hemorrhage with QBL 1360 ml. Hemoglobin down trending on serial exam. No s/s of ongoing blood loss. AM Hgb pending, would plan to transfuse 1U pRBC, obtain repeat coags, and hold Lovenox if less than 7.   - GI: Bowel regimen. PRN simethicone QID. PRN antiemetics.  - : S/p putnam, voiding spont  - PNC: Rh pos, Rubella immune. No interventions indicated.   - Breastfeeding; no issues  - Contraception: Declines. S/p extensive contraceptive counseling. Reassess at 6 week postpartum visit.   - PPx: Encourage ambulation, IS, SCDs while confined to bed    # Hx postpartum DVT/PE  - Plan for Lovenox ppx 40 mg BID for 6 weeks postpartum     # CHTN  - BPs normotensive to low mild range  - No antihypertensives indicated at present   - No s/sx of preeclampsia   - HELLP labs notable for elevated AST/ALT less than twice the upper limit of normal, down trending on recheck. Repeat as clinically indicated.  - Daily weights, strict I/Os. Weight down trending.     # Uterine rupture  # Hx classical  section   - Aware of need for delivery by pre-labor repeat  section in any future pregnancies     Dispo: Anticipate discharge today if AM Hgb > 7    Michoacano Daly MD  Ob/Gyn Resident, PGY-3  10/16/2021 9:22 AM

## 2021-10-16 NOTE — PLAN OF CARE
VSS. Denies SOB, no head ache, vision changes or epigastric pain. Reflex +2, 0 clonus. Up ad gabbi. Fundus firm at 2 cm below umbilicus. Lochia scant. Adequate I&O. +BS, passing flatus, denies nausea. Incisional pain is managed with Tylenol, Ibuprofen and Oxycodone 10 mg. Lidocaine patch in place on abdomen just above the incision site. Incision LEOLA, c/d/i. Pt visited baby at NICU at 0200, came back to unit around 0430. Hemoglobin recheck this AM. Continue plan of care.

## 2021-10-16 NOTE — LACTATION NOTE
This note was copied from a baby's chart.  D/I: Supportive check in with Robinson on [facility]. She had not called insurance yet regarding pump coverage, but decided to get the purchase pump from [facility] ([employer]) before being discharged. She said she had not gotten anymore milk with pumping since we talked the day before, but was about to pump. We reviewed not being able to use her expressed breastmilk until her oxycodone doses are decreased, but that she needs to take her pain medications as prescribed if she is needing it to control pain. We talked about importance of establishing breastmilk supply, keeping on schedule, even if not using the milk currently. At current oxycodone doses, Gustavo can not safely receive mom's milk, however lactation can check with [facility] on Monday to determine if can use for oral cares (milk on swabs and a small puddle); this milk is in NICU freezer, clearly labeled not to use until notified by lactation with information in sticky in infant's chart. Mom will be going home with prescription for weaning off oxycodone; lactation will continue to follow.  A: Review of medication compatibility with breastmilk, review of pumping guidelines to establish milk supply.   P: Will continue to provide lactation support.    ESTELLE Pillai, RN, IBCLC
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
## 2021-10-16 NOTE — PLAN OF CARE
Discharge order in prior to start of shift. Discharge meds given and reviewed with pt. AVS given and reviewed with pt. RN emphasize need for follow up at 1 wk. Pt declined flu shot prior to discharge. RN spoke with Juhi, NICU lactation. Updated her on pt's oxycodone prescription. Pt's pumped milk sent to NICU. NICU lactation in charge of determining use as mom is over safe breastfeeding threshold. Pt declined to watch videos, post partum depression and never shake baby. Education reviewed verbally with RN with understanding. Adequate for discharge.

## 2021-10-16 NOTE — PROVIDER NOTIFICATION
Will you be discharging pt with oxycodone? She was taking 10mg every 4-5hrs! Dr. Nicholson text paged and updated.

## 2021-10-16 NOTE — PLAN OF CARE
VSS. Afebrile. Up ad gabbi. Voiding and tolerating regular diet. PIV removed. C/o pain and medicated with some relief. Very tearful this morning but this afternoon pt was smiling after she was able to pump large amount of milk for baby in NICU. Breast pump given to pt. Pt had a lot of questions about baby and directed pt to talk to NICU staff as baby is in NICU care. FOB present in room but never provided pt with any emotional or physical support when pt was tearful or having questions about baby. FOB eating breakfast or playing video games only.  Confirmed with pt that a [facility]oo  would not be able to see pt today and pt was ok with that. Anticipate discharge today.

## 2021-10-16 NOTE — PROVIDER NOTIFICATION
Pt taking 10mg oxycodone every 4-5hrs. Will you be ordering oxycodone for discharge today? Dr. Thakur text paged and updated.

## 2021-10-17 LAB
ABO/RH(D): NORMAL
ANTIBODY SCREEN: NEGATIVE

## 2021-10-18 ENCOUNTER — TELEPHONE (OUTPATIENT)
Dept: BEHAVIORAL HEALTH | Facility: CLINIC | Age: 21
End: 2021-10-18

## 2021-10-18 NOTE — TELEPHONE ENCOUNTER
Patient outreach - writer called phone number listed as contact in patient's Epic chart. Call was answered by unknown male who stated to writer that this was the wrong number. Will attempt to connect with patient by other means and will document accordingly.

## 2021-10-19 NOTE — TELEPHONE ENCOUNTER
[facility] received updated phone number from Karla Jack, NICU .     Attempted to call patient again, no answer, voicemail left.     Will make second attempt later this week. If no response, will continue to consult with Karla Yin.

## 2021-10-21 LAB
CANNABINOIDS UR CFM-MCNC: 1404 NG/ML
CARBOXYTHC/CREAT UR: 511 NG/MG CREAT
CREAT UR-MCNC: 275 MG/DL

## 2021-10-25 ENCOUNTER — HOSPITAL ENCOUNTER (EMERGENCY)
Facility: CLINIC | Age: 21
Discharge: HOME | End: 2021-10-25
Attending: EMERGENCY MEDICINE | Admitting: EMERGENCY MEDICINE
Payer: COMMERCIAL

## 2021-10-25 ENCOUNTER — APPOINTMENT (OUTPATIENT)
Dept: CT IMAGING | Facility: CLINIC | Age: 21
End: 2021-10-25
Attending: EMERGENCY MEDICINE
Payer: COMMERCIAL

## 2021-10-25 ENCOUNTER — APPOINTMENT (OUTPATIENT)
Dept: GENERAL RADIOLOGY | Facility: CLINIC | Age: 21
End: 2021-10-25
Attending: EMERGENCY MEDICINE
Payer: COMMERCIAL

## 2021-10-25 ENCOUNTER — APPOINTMENT (OUTPATIENT)
Dept: ULTRASOUND IMAGING | Facility: CLINIC | Age: 21
End: 2021-10-25
Attending: EMERGENCY MEDICINE
Payer: COMMERCIAL

## 2021-10-25 VITALS
WEIGHT: 293 LBS | RESPIRATION RATE: 18 BRPM | BODY MASS INDEX: 48.26 KG/M2 | TEMPERATURE: 99.5 F | OXYGEN SATURATION: 98 % | DIASTOLIC BLOOD PRESSURE: 78 MMHG | SYSTOLIC BLOOD PRESSURE: 132 MMHG | HEART RATE: 88 BPM

## 2021-10-25 DIAGNOSIS — R10.84 ABDOMINAL PAIN, GENERALIZED: ICD-10-CM

## 2021-10-25 LAB
ALBUMIN SERPL-MCNC: 2.8 G/DL (ref 3.4–5)
ALBUMIN UR-MCNC: NEGATIVE MG/DL
ALP SERPL-CCNC: 83 U/L (ref 40–150)
ALT SERPL W P-5'-P-CCNC: 24 U/L (ref 0–50)
ANION GAP SERPL CALCULATED.3IONS-SCNC: 1 MMOL/L (ref 3–14)
APPEARANCE UR: CLEAR
AST SERPL W P-5'-P-CCNC: 14 U/L (ref 0–45)
B-HCG SERPL-ACNC: 15 IU/L (ref 0–5)
BASOPHILS # BLD AUTO: 0 10E3/UL (ref 0–0.2)
BASOPHILS NFR BLD AUTO: 0 %
BILIRUB SERPL-MCNC: 0.3 MG/DL (ref 0.2–1.3)
BILIRUB UR QL STRIP: NEGATIVE
BUN SERPL-MCNC: 8 MG/DL (ref 7–30)
CALCIUM SERPL-MCNC: 8.6 MG/DL (ref 8.5–10.1)
CHLORIDE BLD-SCNC: 112 MMOL/L (ref 94–109)
CO2 SERPL-SCNC: 27 MMOL/L (ref 20–32)
COLOR UR AUTO: ABNORMAL
CREAT SERPL-MCNC: 0.82 MG/DL (ref 0.52–1.04)
EOSINOPHIL # BLD AUTO: 0.1 10E3/UL (ref 0–0.7)
EOSINOPHIL NFR BLD AUTO: 1 %
ERYTHROCYTE [DISTWIDTH] IN BLOOD BY AUTOMATED COUNT: 13.2 % (ref 10–15)
GFR SERPL CREATININE-BSD FRML MDRD: >90 ML/MIN/1.73M2
GLUCOSE BLD-MCNC: 84 MG/DL (ref 70–99)
GLUCOSE UR STRIP-MCNC: NEGATIVE MG/DL
HCT VFR BLD AUTO: 24.7 % (ref 35–47)
HGB BLD-MCNC: 7.8 G/DL (ref 11.7–15.7)
HGB UR QL STRIP: NEGATIVE
IMM GRANULOCYTES # BLD: 0.1 10E3/UL
IMM GRANULOCYTES NFR BLD: 1 %
KETONES UR STRIP-MCNC: NEGATIVE MG/DL
LEUKOCYTE ESTERASE UR QL STRIP: ABNORMAL
LIPASE SERPL-CCNC: 58 U/L (ref 73–393)
LYMPHOCYTES # BLD AUTO: 2.9 10E3/UL (ref 0.8–5.3)
LYMPHOCYTES NFR BLD AUTO: 27 %
MCH RBC QN AUTO: 27 PG (ref 26.5–33)
MCHC RBC AUTO-ENTMCNC: 31.6 G/DL (ref 31.5–36.5)
MCV RBC AUTO: 86 FL (ref 78–100)
MONOCYTES # BLD AUTO: 0.4 10E3/UL (ref 0–1.3)
MONOCYTES NFR BLD AUTO: 4 %
MUCOUS THREADS #/AREA URNS LPF: PRESENT /LPF
NEUTROPHILS # BLD AUTO: 7 10E3/UL (ref 1.6–8.3)
NEUTROPHILS NFR BLD AUTO: 67 %
NITRATE UR QL: NEGATIVE
NRBC # BLD AUTO: 0 10E3/UL
NRBC BLD AUTO-RTO: 0 /100
PH UR STRIP: 6.5 [PH] (ref 5–7)
PLATELET # BLD AUTO: 409 10E3/UL (ref 150–450)
POTASSIUM BLD-SCNC: 4.2 MMOL/L (ref 3.4–5.3)
PROT SERPL-MCNC: 7.6 G/DL (ref 6.8–8.8)
RBC # BLD AUTO: 2.89 10E6/UL (ref 3.8–5.2)
RBC URINE: 2 /HPF
SODIUM SERPL-SCNC: 140 MMOL/L (ref 133–144)
SP GR UR STRIP: 1.02 (ref 1–1.03)
SQUAMOUS EPITHELIAL: 1 /HPF
UROBILINOGEN UR STRIP-MCNC: 2 MG/DL
WBC # BLD AUTO: 10.6 10E3/UL (ref 4–11)
WBC URINE: 11 /HPF

## 2021-10-25 PROCEDURE — 81003 URINALYSIS AUTO W/O SCOPE: CPT | Performed by: EMERGENCY MEDICINE

## 2021-10-25 PROCEDURE — 99285 EMERGENCY DEPT VISIT HI MDM: CPT | Mod: 25 | Performed by: EMERGENCY MEDICINE

## 2021-10-25 PROCEDURE — 250N000011 HC RX IP 250 OP 636: Performed by: EMERGENCY MEDICINE

## 2021-10-25 PROCEDURE — 80053 COMPREHEN METABOLIC PANEL: CPT | Performed by: EMERGENCY MEDICINE

## 2021-10-25 PROCEDURE — 250N000009 HC RX 250: Performed by: EMERGENCY MEDICINE

## 2021-10-25 PROCEDURE — 258N000003 HC RX IP 258 OP 636: Performed by: EMERGENCY MEDICINE

## 2021-10-25 PROCEDURE — 76830 TRANSVAGINAL US NON-OB: CPT

## 2021-10-25 PROCEDURE — 99285 EMERGENCY DEPT VISIT HI MDM: CPT | Performed by: EMERGENCY MEDICINE

## 2021-10-25 PROCEDURE — 74177 CT ABD & PELVIS W/CONTRAST: CPT

## 2021-10-25 PROCEDURE — 87086 URINE CULTURE/COLONY COUNT: CPT | Performed by: EMERGENCY MEDICINE

## 2021-10-25 PROCEDURE — 85025 COMPLETE CBC W/AUTO DIFF WBC: CPT | Performed by: EMERGENCY MEDICINE

## 2021-10-25 PROCEDURE — 96361 HYDRATE IV INFUSION ADD-ON: CPT | Performed by: EMERGENCY MEDICINE

## 2021-10-25 PROCEDURE — 74019 RADEX ABDOMEN 2 VIEWS: CPT

## 2021-10-25 PROCEDURE — 96374 THER/PROPH/DIAG INJ IV PUSH: CPT | Performed by: EMERGENCY MEDICINE

## 2021-10-25 PROCEDURE — 84702 CHORIONIC GONADOTROPIN TEST: CPT | Performed by: EMERGENCY MEDICINE

## 2021-10-25 PROCEDURE — 83690 ASSAY OF LIPASE: CPT | Performed by: EMERGENCY MEDICINE

## 2021-10-25 PROCEDURE — 36415 COLL VENOUS BLD VENIPUNCTURE: CPT | Performed by: EMERGENCY MEDICINE

## 2021-10-25 RX ORDER — NAPROXEN 500 MG/1
500 TABLET ORAL 2 TIMES DAILY WITH MEALS
Qty: 30 TABLET | Refills: 0 | Status: SHIPPED | OUTPATIENT
Start: 2021-10-25 | End: 2021-10-25

## 2021-10-25 RX ORDER — SODIUM CHLORIDE 9 MG/ML
INJECTION, SOLUTION INTRAVENOUS CONTINUOUS
Status: DISCONTINUED | OUTPATIENT
Start: 2021-10-25 | End: 2021-10-25 | Stop reason: HOSPADM

## 2021-10-25 RX ORDER — IOPAMIDOL 755 MG/ML
135 INJECTION, SOLUTION INTRAVASCULAR ONCE
Status: COMPLETED | OUTPATIENT
Start: 2021-10-25 | End: 2021-10-25

## 2021-10-25 RX ORDER — HYDROMORPHONE HYDROCHLORIDE 1 MG/ML
0.5 INJECTION, SOLUTION INTRAMUSCULAR; INTRAVENOUS; SUBCUTANEOUS
Status: COMPLETED | OUTPATIENT
Start: 2021-10-25 | End: 2021-10-25

## 2021-10-25 RX ORDER — IBUPROFEN 600 MG/1
600 TABLET, FILM COATED ORAL EVERY 8 HOURS PRN
Qty: 60 TABLET | Refills: 0 | Status: SHIPPED | OUTPATIENT
Start: 2021-10-25 | End: 2021-11-02

## 2021-10-25 RX ADMIN — SODIUM CHLORIDE 1000 ML: 9 INJECTION, SOLUTION INTRAVENOUS at 09:35

## 2021-10-25 RX ADMIN — HYDROMORPHONE HYDROCHLORIDE 0.5 MG: 1 INJECTION, SOLUTION INTRAMUSCULAR; INTRAVENOUS; SUBCUTANEOUS at 10:07

## 2021-10-25 RX ADMIN — SODIUM CHLORIDE 74 ML: 9 INJECTION, SOLUTION INTRAVENOUS at 12:37

## 2021-10-25 RX ADMIN — IOPAMIDOL 135 ML: 755 INJECTION, SOLUTION INTRAVENOUS at 12:36

## 2021-10-25 ASSESSMENT — ENCOUNTER SYMPTOMS
ABDOMINAL PAIN: 1
VOMITING: 0
DYSURIA: 0
NAUSEA: 0
HEMATURIA: 0
FEVER: 0

## 2021-10-25 NOTE — ED TRIAGE NOTES
Abdominal pain X 2 days. Denies any new foods. Denies N/V and diarrhea. Hx of  10/13/21 but states that its niot the incision that hurts. The pain is inside.

## 2021-10-25 NOTE — ED PROVIDER NOTES
"    [facility] EMERGENCY DEPARTMENT ([facility])    10/25/21     ED 6  9:36 AM   History     Chief Complaint   Patient presents with     Abdominal Pain     2 days of abominal pain     The history is provided by the patient and medical records.     Robinson Myers is a 21 year old female  with history of hypertension, asthma, PE, DVT in pregnancy, who presents with 2 days of abdominal pain.  Patient had recent complicated pregnancy with uterine rupture and abruption, and underwent  delivery at 34w1d on 10/13/21. This was her second , was done because the placenta had detached and her baby developed fetal bradycardia. Her baby is in the NICU at this time. Patient's postpartum recovery has been going well, but she developed increased lower abdominal pain \"on the inside.\" Pain is in bilateral lower abdomen, waxing waning, currently at a 7/10. She was discharged with Tylenol, ibuprofen, and oxycodone for pain, stopped taking these. Her lochia has been lighter, no heavy vaginal bleeding. She has had intermittent constipation. No pain with having a bowel movement. No nausea, vomiting, fevers. No dysuria. Patient denies any other major medical problems. Hasn't had postpartum follow-up with OB/GYN yet    Past Medical History  Past Medical History:   Diagnosis Date     Asthma      HDL deficiency      Obesity      Past Surgical History:   Procedure Laterality Date      SECTION N/A 10/13/2021    Procedure:  SECTION;  Surgeon: Cortney Bell MD;  Location:  L+D     NO HISTORY OF SURGERY       ibuprofen (ADVIL/MOTRIN) 600 MG tablet  acetaminophen (TYLENOL) 325 MG tablet  acetaminophen (TYLENOL) 325 MG tablet  albuterol (PROAIR HFA/PROVENTIL HFA/VENTOLIN HFA) 108 (90 Base) MCG/ACT inhaler  enoxaparin ANTICOAGULANT (LOVENOX) 40 MG/0.4ML syringe  ibuprofen (ADVIL/MOTRIN) 600 MG tablet  oxyCODONE (ROXICODONE) 5 MG tablet  Prenatal Vit-Fe Fumarate-FA (PRENATAL PLUS) 27-1 MG " TABS  senna-docusate (SENOKOT-S/PERICOLACE) 8.6-50 MG tablet  sodium chloride (OCEAN) 0.65 % nasal spray      No Known Allergies  Family History  Family History   Problem Relation Age of Onset     Family History Negative Mother         per patient     Social History   Social History     Tobacco Use     Smoking status: Former Smoker     Smokeless tobacco: Never Used   Substance Use Topics     Alcohol use: No     Drug use: No      Past medical history, past surgical history, medications, allergies, family history, and social history were reviewed with the patient. No additional pertinent items.       Review of Systems   Constitutional: Negative for fever.   Gastrointestinal: Positive for abdominal pain. Negative for nausea and vomiting.   Genitourinary: Positive for vaginal bleeding (light, improving). Negative for dysuria and hematuria.     A complete review of systems was performed with pertinent positives and negatives noted in the HPI, and all other systems negative.    Physical Exam   BP: 131/86  Pulse: 66  Temp: 99.5  F (37.5  C)  Resp: 16  Weight: 135.6 kg (299 lb)  SpO2: 100 %  Physical Exam  Constitutional:       General: She is not in acute distress.     Appearance: She is not diaphoretic.   HENT:      Head: Normocephalic.      Mouth/Throat:      Pharynx: No oropharyngeal exudate.   Eyes:      Extraocular Movements: Extraocular movements intact.   Cardiovascular:      Rate and Rhythm: Normal rate and regular rhythm.      Heart sounds: Normal heart sounds.   Pulmonary:      Effort: No respiratory distress.      Breath sounds: Normal breath sounds.   Abdominal:      General: There is no distension.      Palpations: Abdomen is soft.      Tenderness: There is no abdominal tenderness.      Comments: Mild diffuse abdominal tenderness   Musculoskeletal:         General: No deformity.      Cervical back: Neck supple.   Skin:     General: Skin is warm and dry.   Neurological:      Mental Status: She is alert.       Comments: alert   Psychiatric:         Behavior: Behavior normal.         ED Course      Procedures  Results for orders placed or performed during the hospital encounter of 10/25/21   XR Abdomen 2 Views     Status: None    Narrative    XR ABDOMEN 2VIEWS 10/25/2021 11:26 AM    HISTORY: abd pain    COMPARISON: None.      Impression    IMPRESSION: Bowel gas pattern is nonobstructed. No abnormal mass or  calcification.    ALE VERA MD         SYSTEM ID:  [id number]   US Pelvic Complete w Transvaginal     Status: None    Narrative    US PELVIC COMPLETE WITH TRANSVAGINAL 10/25/2021 11:21 AM     HISTORY: Pelvic pain, recent  delivery  COMPARISON: None  TECHNIQUE: Transabdominal scans were performed. Endovaginal ultrasound  was performed to better visualize the adnexa.    FINDINGS:    UTERUS: 14 x 9 x 7 cm. Normal in size and position with no masses.    ENDOMETRIUM: 15 mm. Small amount of fluid/blood in the endometrial  cavity. No evidence of retained products of conception.    Neither ovary is visualized. No significant adnexal mass. No  significant free fluid.      Impression    IMPRESSION:  Small amount of fluid/blood in the endometrial cavity. No abnormality  to explain the patient's pain.  Neither ovary visualized.    ALE VERA MD         SYSTEM ID:  [id number]   CT Abdomen Pelvis w Contrast     Status: None (Preliminary result)    Narrative    CT ABDOMEN PELVIS WITH CONTRAST 10/25/2021 12:44 PM    CLINICAL HISTORY: Abdominal pain, acute, nonlocalized.    TECHNIQUE: CT scan of the abdomen and pelvis was performed following  injection of IV contrast. Multiplanar reformats were obtained. Dose  reduction techniques were used.  CONTRAST: 135mL Isovue 370    COMPARISON: None.    FINDINGS:   LOWER CHEST: Normal.    HEPATOBILIARY: Normal.    PANCREAS: Normal.    SPLEEN: Normal.    ADRENAL GLANDS: Normal.    KIDNEYS/BLADDER: Normal.    BOWEL: Normal.    PELVIC ORGANS: Postoperative changes from recent   section.  Small subcutaneous seroma in the incision measures 2.5 x 7.5 cm. No  evidence of an abscess.    ADDITIONAL FINDINGS: None.    MUSCULOSKELETAL: Normal.      Impression    IMPRESSION:   1.  Postoperative findings from recent  section.  2.  Small subcutaneous seroma. No evidence of an abscess.  3.  No bowel obstruction or inflammatory changes.   Comprehensive metabolic panel     Status: Abnormal   Result Value Ref Range    Sodium 140 133 - 144 mmol/L    Potassium 4.2 3.4 - 5.3 mmol/L    Chloride 112 (H) 94 - 109 mmol/L    Carbon Dioxide (CO2) 27 20 - 32 mmol/L    Anion Gap 1 (L) 3 - 14 mmol/L    Urea Nitrogen 8 7 - 30 mg/dL    Creatinine 0.82 0.52 - 1.04 mg/dL    Calcium 8.6 8.5 - 10.1 mg/dL    Glucose 84 70 - 99 mg/dL    Alkaline Phosphatase 83 40 - 150 U/L    AST 14 0 - 45 U/L    ALT 24 0 - 50 U/L    Protein Total 7.6 6.8 - 8.8 g/dL    Albumin 2.8 (L) 3.4 - 5.0 g/dL    Bilirubin Total 0.3 0.2 - 1.3 mg/dL    GFR Estimate >90 >60 mL/min/1.73m2   Lipase     Status: Abnormal   Result Value Ref Range    Lipase 58 (L) 73 - 393 U/L   UA with Microscopic reflex to Culture     Status: Abnormal    Specimen: Urine, Clean Catch   Result Value Ref Range    Color Urine Light Yellow Colorless, Straw, Light Yellow, Yellow    Appearance Urine Clear Clear    Glucose Urine Negative Negative mg/dL    Bilirubin Urine Negative Negative    Ketones Urine Negative Negative mg/dL    Specific Gravity Urine 1.017 1.003 - 1.035    Blood Urine Negative Negative    pH Urine 6.5 5.0 - 7.0    Protein Albumin Urine Negative Negative mg/dL    Urobilinogen Urine 2.0 Normal, 2.0 mg/dL    Nitrite Urine Negative Negative    Leukocyte Esterase Urine Large (A) Negative    Mucus Urine Present (A) None Seen /LPF    RBC Urine 2 <=2 /HPF    WBC Urine 11 (H) <=5 /HPF    Squamous Epithelials Urine 1 <=1 /HPF    Narrative    Urine Culture ordered based on laboratory criteria   CBC with platelets and differential     Status: Abnormal   Result  Value Ref Range    WBC Count 10.6 4.0 - 11.0 10e3/uL    RBC Count 2.89 (L) 3.80 - 5.20 10e6/uL    Hemoglobin 7.8 (L) 11.7 - 15.7 g/dL    Hematocrit 24.7 (L) 35.0 - 47.0 %    MCV 86 78 - 100 fL    MCH 27.0 26.5 - 33.0 pg    MCHC 31.6 31.5 - 36.5 g/dL    RDW 13.2 10.0 - 15.0 %    Platelet Count 409 150 - 450 10e3/uL    % Neutrophils 67 %    % Lymphocytes 27 %    % Monocytes 4 %    % Eosinophils 1 %    % Basophils 0 %    % Immature Granulocytes 1 %    NRBCs per 100 WBC 0 <1 /100    Absolute Neutrophils 7.0 1.6 - 8.3 10e3/uL    Absolute Lymphocytes 2.9 0.8 - 5.3 10e3/uL    Absolute Monocytes 0.4 0.0 - 1.3 10e3/uL    Absolute Eosinophils 0.1 0.0 - 0.7 10e3/uL    Absolute Basophils 0.0 0.0 - 0.2 10e3/uL    Absolute Immature Granulocytes 0.1 (H) <=0.0 10e3/uL    Absolute NRBCs 0.0 10e3/uL   HCG quantitative pregnancy     Status: Abnormal   Result Value Ref Range    hCG Quantitative 15 (H) 0 - 5 IU/L   CBC with platelets differential     Status: Abnormal    Narrative    The following orders were created for panel order CBC with platelets differential.  Procedure                               Abnormality         Status                     ---------                               -----------         ------                     CBC with platelets and d...[[id number]]  Abnormal            Final result                 Please view results for these tests on the individual orders.            Results for orders placed or performed during the hospital encounter of 10/25/21   XR Abdomen 2 Views     Status: None    Narrative    XR ABDOMEN 2VIEWS 10/25/2021 11:26 AM    HISTORY: abd pain    COMPARISON: None.      Impression    IMPRESSION: Bowel gas pattern is nonobstructed. No abnormal mass or  calcification.    ALE VERA MD         SYSTEM ID:  [id number]   US Pelvic Complete w Transvaginal     Status: None    Narrative    US PELVIC COMPLETE WITH TRANSVAGINAL 10/25/2021 11:21 AM     HISTORY: Pelvic pain, recent   delivery  COMPARISON: None  TECHNIQUE: Transabdominal scans were performed. Endovaginal ultrasound  was performed to better visualize the adnexa.    FINDINGS:    UTERUS: 14 x 9 x 7 cm. Normal in size and position with no masses.    ENDOMETRIUM: 15 mm. Small amount of fluid/blood in the endometrial  cavity. No evidence of retained products of conception.    Neither ovary is visualized. No significant adnexal mass. No  significant free fluid.      Impression    IMPRESSION:  Small amount of fluid/blood in the endometrial cavity. No abnormality  to explain the patient's pain.  Neither ovary visualized.    ALE VERA MD         SYSTEM ID:  [id number]   CT Abdomen Pelvis w Contrast     Status: None (Preliminary result)    Narrative    CT ABDOMEN PELVIS WITH CONTRAST 10/25/2021 12:44 PM    CLINICAL HISTORY: Abdominal pain, acute, nonlocalized.    TECHNIQUE: CT scan of the abdomen and pelvis was performed following  injection of IV contrast. Multiplanar reformats were obtained. Dose  reduction techniques were used.  CONTRAST: 135mL Isovue 370    COMPARISON: None.    FINDINGS:   LOWER CHEST: Normal.    HEPATOBILIARY: Normal.    PANCREAS: Normal.    SPLEEN: Normal.    ADRENAL GLANDS: Normal.    KIDNEYS/BLADDER: Normal.    BOWEL: Normal.    PELVIC ORGANS: Postoperative changes from recent  section.  Small subcutaneous seroma in the incision measures 2.5 x 7.5 cm. No  evidence of an abscess.    ADDITIONAL FINDINGS: None.    MUSCULOSKELETAL: Normal.      Impression    IMPRESSION:   1.  Postoperative findings from recent  section.  2.  Small subcutaneous seroma. No evidence of an abscess.  3.  No bowel obstruction or inflammatory changes.   Comprehensive metabolic panel     Status: Abnormal   Result Value Ref Range    Sodium 140 133 - 144 mmol/L    Potassium 4.2 3.4 - 5.3 mmol/L    Chloride 112 (H) 94 - 109 mmol/L    Carbon Dioxide (CO2) 27 20 - 32 mmol/L    Anion Gap 1 (L) 3 - 14 mmol/L    Urea Nitrogen 8 7 -  30 mg/dL    Creatinine 0.82 0.52 - 1.04 mg/dL    Calcium 8.6 8.5 - 10.1 mg/dL    Glucose 84 70 - 99 mg/dL    Alkaline Phosphatase 83 40 - 150 U/L    AST 14 0 - 45 U/L    ALT 24 0 - 50 U/L    Protein Total 7.6 6.8 - 8.8 g/dL    Albumin 2.8 (L) 3.4 - 5.0 g/dL    Bilirubin Total 0.3 0.2 - 1.3 mg/dL    GFR Estimate >90 >60 mL/min/1.73m2   Lipase     Status: Abnormal   Result Value Ref Range    Lipase 58 (L) 73 - 393 U/L   UA with Microscopic reflex to Culture     Status: Abnormal    Specimen: Urine, Clean Catch   Result Value Ref Range    Color Urine Light Yellow Colorless, Straw, Light Yellow, Yellow    Appearance Urine Clear Clear    Glucose Urine Negative Negative mg/dL    Bilirubin Urine Negative Negative    Ketones Urine Negative Negative mg/dL    Specific Gravity Urine 1.017 1.003 - 1.035    Blood Urine Negative Negative    pH Urine 6.5 5.0 - 7.0    Protein Albumin Urine Negative Negative mg/dL    Urobilinogen Urine 2.0 Normal, 2.0 mg/dL    Nitrite Urine Negative Negative    Leukocyte Esterase Urine Large (A) Negative    Mucus Urine Present (A) None Seen /LPF    RBC Urine 2 <=2 /HPF    WBC Urine 11 (H) <=5 /HPF    Squamous Epithelials Urine 1 <=1 /HPF    Narrative    Urine Culture ordered based on laboratory criteria   CBC with platelets and differential     Status: Abnormal   Result Value Ref Range    WBC Count 10.6 4.0 - 11.0 10e3/uL    RBC Count 2.89 (L) 3.80 - 5.20 10e6/uL    Hemoglobin 7.8 (L) 11.7 - 15.7 g/dL    Hematocrit 24.7 (L) 35.0 - 47.0 %    MCV 86 78 - 100 fL    MCH 27.0 26.5 - 33.0 pg    MCHC 31.6 31.5 - 36.5 g/dL    RDW 13.2 10.0 - 15.0 %    Platelet Count 409 150 - 450 10e3/uL    % Neutrophils 67 %    % Lymphocytes 27 %    % Monocytes 4 %    % Eosinophils 1 %    % Basophils 0 %    % Immature Granulocytes 1 %    NRBCs per 100 WBC 0 <1 /100    Absolute Neutrophils 7.0 1.6 - 8.3 10e3/uL    Absolute Lymphocytes 2.9 0.8 - 5.3 10e3/uL    Absolute Monocytes 0.4 0.0 - 1.3 10e3/uL    Absolute Eosinophils  0.1 0.0 - 0.7 10e3/uL    Absolute Basophils 0.0 0.0 - 0.2 10e3/uL    Absolute Immature Granulocytes 0.1 (H) <=0.0 10e3/uL    Absolute NRBCs 0.0 10e3/uL   HCG quantitative pregnancy     Status: Abnormal   Result Value Ref Range    hCG Quantitative 15 (H) 0 - 5 IU/L   CBC with platelets differential     Status: Abnormal    Narrative    The following orders were created for panel order CBC with platelets differential.  Procedure                               Abnormality         Status                     ---------                               -----------         ------                     CBC with platelets and d...[[id number]]  Abnormal            Final result                 Please view results for these tests on the individual orders.     Medications   0.9% sodium chloride BOLUS (0 mLs Intravenous Stopped 10/25/21 1336)     Followed by   sodium chloride 0.9% infusion (has no administration in time range)   HYDROmorphone (PF) (DILAUDID) injection 0.5 mg (0.5 mg Intravenous Given 10/25/21 1007)   iopamidol (ISOVUE-370) solution 135 mL (135 mLs Intravenous Given 10/25/21 1236)   sodium chloride 0.9 % bag 500mL for CT scan flush use (74 mLs As instructed Given 10/25/21 1237)        Assessments & Plan (with Medical Decision Making)   21-year-old female presents to us with a chief complaint of abdominal pain  A week after a .  Differential includes but not limited to postoperative infection, UTI, abdominal pain, appendicitis, infectious process.  Vital signs today were unremarkable.  Labs are significant for a normal CMP.  Hemoglobin was 7.8 which is improved from her previous level.  No leukocytosis.  UA is clear.  Ultrasound as well as CT scan were unremarkable.  Patient did note that she had stopped taking the medication she had been prescribed for pain.  This pain may certainly be secondary to residual pain from her .  Recommend she continue the ibuprofen as it is generally safe for breast-feeding.   Patient will follow up with her surgeon and return as needed.    I have reviewed the nursing notes. I have reviewed the findings, diagnosis, plan and need for follow up with the patient.    Discharge Medication List as of 10/25/2021  1:37 PM      START taking these medications    Details   !! ibuprofen (ADVIL/MOTRIN) 600 MG tablet Take 1 tablet (600 mg) by mouth every 8 hours as needed for moderate pain, Disp-60 tablet, R-0, Local Print       !! - Potential duplicate medications found. Please discuss with provider.          Final diagnoses:   Abdominal pain, generalized   I, Ivy Arias, am serving as a trained medical scribe to document services personally performed by Gunnar Thompson DO based on the provider's statements to me on October 25, 2021.  This document has been checked and approved by the attending provider.    I, Gunnar Thompson DO, was physically present and have reviewed and verified the accuracy of this note documented by Ivy Arias, medical scribe.      Gunnar Thompson DO       [facility] EMERGENCY DEPARTMENT  10/25/2021     Gunnar Thompson DO  10/25/21 0829

## 2021-10-26 LAB — BACTERIA UR CULT: NORMAL

## 2021-10-26 NOTE — TELEPHONE ENCOUNTER
Spoke with patient, offered [facility] services, patient expressing interest in scheduling.     Scheduled video visit for 11/2 at 1:00pm.     Sakina Jiang [facility]  Behavioral Health Clinician

## 2022-02-15 ENCOUNTER — OFFICE VISIT (OUTPATIENT)
Dept: URGENT CARE | Facility: URGENT CARE | Age: 22
End: 2022-02-15
Payer: COMMERCIAL

## 2022-02-15 VITALS
HEIGHT: 66 IN | HEART RATE: 68 BPM | DIASTOLIC BLOOD PRESSURE: 85 MMHG | BODY MASS INDEX: 47.09 KG/M2 | TEMPERATURE: 97.6 F | OXYGEN SATURATION: 99 % | SYSTOLIC BLOOD PRESSURE: 134 MMHG | WEIGHT: 293 LBS

## 2022-02-15 DIAGNOSIS — R20.0 RIGHT FACIAL NUMBNESS: Primary | ICD-10-CM

## 2022-02-15 PROBLEM — E66.01 MORBID OBESITY (H): Status: ACTIVE | Noted: 2022-02-15

## 2022-02-15 PROCEDURE — 99204 OFFICE O/P NEW MOD 45 MIN: CPT | Performed by: PHYSICIAN ASSISTANT

## 2022-02-15 RX ORDER — FLUTICASONE PROPIONATE 110 UG/1
1 AEROSOL, METERED RESPIRATORY (INHALATION) 2 TIMES DAILY
Status: ON HOLD | COMMUNITY
End: 2022-07-14

## 2022-02-15 ASSESSMENT — ENCOUNTER SYMPTOMS
UNEXPECTED WEIGHT CHANGE: 0
FACIAL SWELLING: 0
EYE DISCHARGE: 0
DIZZINESS: 0
CHILLS: 0
SORE THROAT: 0
VOMITING: 0
NUMBNESS: 1
PHOTOPHOBIA: 0
FREQUENCY: 1
WOUND: 0
RHINORRHEA: 0
NECK STIFFNESS: 0
MYALGIAS: 0
HEADACHES: 0
NAUSEA: 0
CHEST TIGHTNESS: 0
FEVER: 0
NEUROLOGICAL NEGATIVE: 1
ENDOCRINE NEGATIVE: 1
HYPERACTIVE: 0
SINUS PAIN: 0
ARTHRALGIAS: 0
SLEEP DISTURBANCE: 0
ADENOPATHY: 0
MUSCULOSKELETAL NEGATIVE: 1
FLANK PAIN: 0
WEAKNESS: 0
ACTIVITY CHANGE: 0
ALLERGIC/IMMUNOLOGIC NEGATIVE: 1
CONFUSION: 0
NECK PAIN: 0
SHORTNESS OF BREATH: 0
HEMATURIA: 0
CARDIOVASCULAR NEGATIVE: 1
DECREASED CONCENTRATION: 0
COUGH: 0
DIAPHORESIS: 0
CONSTITUTIONAL NEGATIVE: 1
PALPITATIONS: 0
GASTROINTESTINAL NEGATIVE: 1
LIGHT-HEADEDNESS: 0
APPETITE CHANGE: 0
NERVOUS/ANXIOUS: 0
RESPIRATORY NEGATIVE: 1
DYSPHORIC MOOD: 0
EYES NEGATIVE: 1
CONSTIPATION: 0
FATIGUE: 0
DIARRHEA: 0
SPEECH DIFFICULTY: 0
DYSURIA: 1
JOINT SWELLING: 0
TROUBLE SWALLOWING: 0
SINUS PRESSURE: 0
ABDOMINAL PAIN: 0
EYE REDNESS: 0
FACIAL ASYMMETRY: 1
VOICE CHANGE: 0
BACK PAIN: 0
WHEEZING: 0
EYE PAIN: 0
POLYDIPSIA: 0

## 2022-02-15 ASSESSMENT — MIFFLIN-ST. JEOR: SCORE: 2131.63

## 2022-02-15 NOTE — PROGRESS NOTES
"Chief Complaint:    Chief Complaint   Patient presents with     Numbness     No feeling on the left side of her face for about few days now         Medical Decision Making:    Vital signs reviewed by Osiel Tavarez PA-C  /85 (BP Location: Left arm, Patient Position: Sitting, Cuff Size: Adult Large)   Pulse 68   Temp 97.6  F (36.4  C) (Tympanic)   Ht 1.676 m (5' 6\")   Wt 136 kg (299 lb 12.8 oz)   SpO2 99%   BMI 48.39 kg/m      Differential Diagnosis:  Bell's Palsy, CVA, lyme disease, unspecified focal neurological deficit     ASSESSMENT:     1. Right facial numbness           PLAN:    Patient is in no acute distress.  No facial droop, slurring of words, or unilateral weakness.  With history of PE, discussed need for further workup with patient.  Patient instructed to go to the ED now for further evaluation, labs, and imaging.   Patient declined EMS transport.  Follow up with PCP this week.  Patient verbalized understanding and agreed with this plan.  Patient discharged in stable condition.    Labs:     No results found for any visits on 02/15/22.    Current Meds:    Current Outpatient Medications:      albuterol (PROAIR HFA/PROVENTIL HFA/VENTOLIN HFA) 108 (90 Base) MCG/ACT inhaler, Inhale 2 puffs into the lungs, Disp: , Rfl:      fluticasone (FLOVENT HFA) 110 MCG/ACT inhaler, Inhale 1 puff into the lungs 2 times daily, Disp: , Rfl:      acetaminophen (TYLENOL) 325 MG tablet, Take 2 tablets (650 mg) by mouth every 6 hours as needed for mild pain Start after Delivery. (Patient not taking: Reported on 2/15/2022), Disp: 100 tablet, Rfl: 0     acetaminophen (TYLENOL) 325 MG tablet, Take 650 mg by mouth (Patient not taking: Reported on 2/15/2022), Disp: , Rfl:      ibuprofen (ADVIL/MOTRIN) 600 MG tablet, Take 1 tablet (600 mg) by mouth every 6 hours as needed for moderate pain Start after delivery (Patient not taking: Reported on 2/15/2022), Disp: 60 tablet, Rfl: 0     oxyCODONE (ROXICODONE) 5 MG tablet, Take " 1-2 tablets (5-10 mg) by mouth every 6 hours as needed for severe pain (Patient not taking: Reported on 2/15/2022), Disp: 18 tablet, Rfl: 0     Prenatal Vit-Fe Fumarate-FA (PRENATAL PLUS) 27-1 MG TABS, Take 1 tablet by mouth (Patient not taking: Reported on 2/15/2022), Disp: , Rfl:      senna-docusate (SENOKOT-S/PERICOLACE) 8.6-50 MG tablet, Take 1 tablet by mouth daily Start after delivery. (Patient not taking: Reported on 2/15/2022), Disp: 100 tablet, Rfl: 0     sodium chloride (OCEAN) 0.65 % nasal spray, Spray 1 spray in nostril (Patient not taking: Reported on 2/15/2022), Disp: , Rfl:     Allergies:  No Known Allergies    SUBJECTIVE    HPI: Robinson Myers is an 21 year old female who presents for evaluation and treatment of numbness and weakness on the L side of her face.  Symptoms started 2 days ago and have not changed. The symptoms are continuous and do not relapse and remit, and are confined to just the left side of the face. This has not happened before. The patient endorses loss of taste on the left side of her tongue, but denies changes to hearing, vision, or smell. No recent infection, or new medications. No weakness, confusion, loss of consciousness, fatigue, confusion, or sleep disturbances. No aggravating or relieving factors identified. No treatments tried.     The patient is in no acute distress and is afebrile with an oxygen saturation of 99%. No shortness of breath, palpitations, or current headache.      ROS:      Review of Systems   Constitutional: Negative for activity change, appetite change, chills, diaphoresis, fatigue, fever and unexpected weight change.   HENT: Negative for congestion, ear pain, facial swelling, hearing loss, mouth sores, rhinorrhea, sinus pressure, sinus pain, sore throat, tinnitus, trouble swallowing and voice change.    Eyes: Negative.  Negative for photophobia, pain, discharge, redness and visual disturbance.   Respiratory: Negative.  Negative for cough, chest  tightness, shortness of breath and wheezing.    Cardiovascular: Negative.  Negative for chest pain and palpitations.   Gastrointestinal: Negative for abdominal pain, constipation, diarrhea, nausea and vomiting.   Endocrine: Negative.    Genitourinary: Negative.    Musculoskeletal: Negative.  Negative for arthralgias, back pain, gait problem, joint swelling, myalgias, neck pain and neck stiffness.   Skin: Negative.  Negative for rash and wound.   Allergic/Immunologic: Negative.  Negative for immunocompromised state.   Neurological: Positive for facial asymmetry and numbness. Negative for dizziness, syncope, speech difficulty, weakness, light-headedness and headaches.   Psychiatric/Behavioral: Negative for confusion, decreased concentration, dysphoric mood and sleep disturbance. The patient is not nervous/anxious and is not hyperactive.         Family History   Family History   Problem Relation Age of Onset     Family History Negative Mother         per patient       Social History  Social History     Socioeconomic History     Marital status: Single     Spouse name: Not on file     Number of children: Not on file     Years of education: Not on file     Highest education level: Not on file   Occupational History     Not on file   Tobacco Use     Smoking status: Former Smoker     Smokeless tobacco: Never Used   Substance and Sexual Activity     Alcohol use: No     Drug use: No     Sexual activity: Not Currently     Partners: Male   Other Topics Concern     Not on file   Social History Narrative     Not on file     Social Determinants of Health     Financial Resource Strain: Not on file   Food Insecurity: Not on file   Transportation Needs: Not on file   Physical Activity: Not on file   Stress: Not on file   Social Connections: Not on file   Intimate Partner Violence: Not on file   Housing Stability: Not on file        Surgical History:  Past Surgical History:   Procedure Laterality Date      SECTION N/A  "10/13/2021    Procedure:  SECTION;  Surgeon: Cortney Bell MD;  Location: [facility]     NO HISTORY OF SURGERY          Problem List:  Patient Active Problem List   Diagnosis     Conduct disorder, adolescent onset type     Chronic hypertension in pregnancy     HDL deficiency     Moderate persistent asthma     Obesity     Pregnant state, incidental     Need for Tdap vaccination     Uterine rupture     Morbid obesity (H)           OBJECTIVE:     Vital signs noted and reviewed by Osiel Tavarez PA-C  /85 (BP Location: Left arm, Patient Position: Sitting, Cuff Size: Adult Large)   Pulse 68   Temp 97.6  F (36.4  C) (Tympanic)   Ht 1.676 m (5' 6\")   Wt 136 kg (299 lb 12.8 oz)   SpO2 99%   BMI 48.39 kg/m       PEFR:    Physical Exam  Vitals and nursing note reviewed.   Constitutional:       General: She is not in acute distress.     Appearance: She is well-developed. She is not ill-appearing, toxic-appearing or diaphoretic.   HENT:      Head: Normocephalic and atraumatic. No abrasion, contusion or laceration.      Jaw: No trismus, tenderness, swelling or pain on movement.      Salivary Glands: Right salivary gland is not diffusely enlarged or tender. Left salivary gland is not diffusely enlarged or tender.        Right Ear: Tympanic membrane and external ear normal. No decreased hearing noted. No laceration, drainage, swelling or tenderness. No middle ear effusion. There is no impacted cerumen. No foreign body. No mastoid tenderness. Tympanic membrane is not injected, perforated, erythematous, retracted or bulging.      Left Ear: Tympanic membrane and external ear normal. No decreased hearing noted. No laceration, drainage, swelling or tenderness.  No middle ear effusion. There is no impacted cerumen. No foreign body. No mastoid tenderness. Tympanic membrane is not injected, perforated, erythematous, retracted or bulging.      Nose: No nasal deformity, signs of injury, mucosal edema, congestion " or rhinorrhea.      Right Sinus: No maxillary sinus tenderness or frontal sinus tenderness.      Left Sinus: No maxillary sinus tenderness or frontal sinus tenderness.      Mouth/Throat:      Tongue: Tongue does not deviate from midline.      Palate: No mass and lesions.      Pharynx: No pharyngeal swelling, oropharyngeal exudate, posterior oropharyngeal erythema or uvula swelling.      Tonsils: No tonsillar abscesses.   Eyes:      General:         Right eye: No discharge.         Left eye: No discharge.      Pupils: Pupils are equal, round, and reactive to light.   Neck:      Trachea: Trachea normal.   Cardiovascular:      Rate and Rhythm: Normal rate and regular rhythm.      Heart sounds: Normal heart sounds, S1 normal and S2 normal. No murmur heard.  No friction rub. No gallop.    Pulmonary:      Effort: Pulmonary effort is normal. No respiratory distress.      Breath sounds: Normal breath sounds. No decreased breath sounds, wheezing, rhonchi or rales.   Abdominal:      General: Bowel sounds are normal. There is no distension.      Palpations: Abdomen is soft. Abdomen is not rigid. There is no mass.      Tenderness: There is no abdominal tenderness. There is no guarding or rebound.   Musculoskeletal:         General: No swelling, tenderness, deformity or signs of injury.      Cervical back: Full passive range of motion without pain, normal range of motion and neck supple. No rigidity or tenderness.   Lymphadenopathy:      Cervical: No cervical adenopathy.   Skin:     General: Skin is warm and dry.      Findings: No lesion.   Neurological:      Mental Status: She is alert and oriented to person, place, and time.      Cranial Nerves: Facial asymmetry present.      Sensory: No sensory deficit.      Motor: Weakness present. No atrophy.      Coordination: Coordination normal.      Gait: Gait normal.      Deep Tendon Reflexes: Reflexes are normal and symmetric.      Comments: Loss of taste on right side of tongue    Psychiatric:         Behavior: Behavior normal. Behavior is cooperative.         Thought Content: Thought content normal.         Judgment: Judgment normal.             Osiel Tavarez PA-C  2/15/2022, 10:16 AM

## 2022-02-15 NOTE — PROGRESS NOTES
Chief Complaint:    No chief complaint on file.        ASSESSMENT     No diagnosis found.       PLAN    Urinalysis discussed with patient  We will call with culture results if resistant.  Wet prep was ***  Rx for *** today  Treatment currentguidelines - also push fluids, may use Pyridium OTC prn.   Follow up with PCP in 2-3 days if symptoms are not improving.  Worrisome symptoms discussed with instructions to go to the ED.  Patient verbalized understanding and agreed with this plan.    Labs:     No results found for any visits on 02/15/22.    Problem history    Patient Active Problem List   Diagnosis     Conduct disorder, adolescent onset type     Chronic hypertension in pregnancy     HDL deficiency     Moderate persistent asthma     Obesity     Pregnant state, incidental     Need for Tdap vaccination     Uterine rupture       Current Meds    Current Outpatient Medications:      acetaminophen (TYLENOL) 325 MG tablet, Take 2 tablets (650 mg) by mouth every 6 hours as needed for mild pain Start after Delivery., Disp: 100 tablet, Rfl: 0     acetaminophen (TYLENOL) 325 MG tablet, Take 650 mg by mouth, Disp: , Rfl:      albuterol (PROAIR HFA/PROVENTIL HFA/VENTOLIN HFA) 108 (90 Base) MCG/ACT inhaler, Inhale 2 puffs into the lungs, Disp: , Rfl:      ibuprofen (ADVIL/MOTRIN) 600 MG tablet, Take 1 tablet (600 mg) by mouth every 6 hours as needed for moderate pain Start after delivery, Disp: 60 tablet, Rfl: 0     oxyCODONE (ROXICODONE) 5 MG tablet, Take 1-2 tablets (5-10 mg) by mouth every 6 hours as needed for severe pain, Disp: 18 tablet, Rfl: 0     Prenatal Vit-Fe Fumarate-FA (PRENATAL PLUS) 27-1 MG TABS, Take 1 tablet by mouth, Disp: , Rfl:      senna-docusate (SENOKOT-S/PERICOLACE) 8.6-50 MG tablet, Take 1 tablet by mouth daily Start after delivery., Disp: 100 tablet, Rfl: 0     sodium chloride (OCEAN) 0.65 % nasal spray, Spray 1 spray in nostril, Disp: , Rfl:     Allergies  No Known  Allergies    SUBJECTIVE    HPI:  Robinson Myers is a 21 year old female who has symptoms of urinary {UTI SYMPTOMS:629646} for {NUMBERS(MULTIPLE):517999} {TIME FRAME:9076}.  {He-She:460440} denies {UTI SYMPTOMS:048803}, flank pain, vaginal discharge, and vaginal odor.    ROS:      Review of Systems   Constitutional: Negative.  Negative for activity change, chills, fatigue and fever.   HENT: Negative for congestion, ear pain, rhinorrhea and sore throat.    Respiratory: Negative.  Negative for cough and shortness of breath.    Cardiovascular: Negative.  Negative for chest pain and palpitations.   Gastrointestinal: Negative.  Negative for abdominal pain, diarrhea, nausea and vomiting.   Endocrine: Negative.  Negative for polydipsia and polyuria.   Genitourinary: Positive for dysuria, frequency and urgency. Negative for flank pain, hematuria, pelvic pain, vaginal discharge and vaginal pain.   Musculoskeletal: Negative.  Negative for myalgias, neck pain and neck stiffness.   Allergic/Immunologic: Negative for immunocompromised state.   Neurological: Negative.  Negative for dizziness, weakness, light-headedness and headaches.   Hematological: Negative for adenopathy.       Family History   Family History   Problem Relation Age of Onset     Family History Negative Mother         per patient        Social History  Social History     Socioeconomic History     Marital status: Single     Spouse name: Not on file     Number of children: Not on file     Years of education: Not on file     Highest education level: Not on file   Occupational History     Not on file   Tobacco Use     Smoking status: Former Smoker     Smokeless tobacco: Never Used   Substance and Sexual Activity     Alcohol use: No     Drug use: No     Sexual activity: Not Currently     Partners: Male   Other Topics Concern     Not on file   Social History Narrative     Not on file     Social Determinants of Health     Financial Resource Strain: Not on file    Food Insecurity: Not on file   Transportation Needs: Not on file   Physical Activity: Not on file   Stress: Not on file   Social Connections: Not on file   Intimate Partner Violence: Not on file   Housing Stability: Not on file           OBJECTIVE     Vital signs noted and reviewed by Osiel Tavarez PA-C  There were no vitals taken for this visit.     Physical Exam  Vitals and nursing note reviewed.   Constitutional:       General: She is not in acute distress.     Appearance: Normal appearance. She is well-developed. She is not ill-appearing, toxic-appearing or diaphoretic.   HENT:      Head: Normocephalic and atraumatic.      Right Ear: Tympanic membrane and external ear normal.      Left Ear: Tympanic membrane and external ear normal.   Eyes:      Pupils: Pupils are equal, round, and reactive to light.   Cardiovascular:      Rate and Rhythm: Normal rate and regular rhythm.      Heart sounds: Normal heart sounds. No murmur heard.  No friction rub. No gallop.    Pulmonary:      Effort: Pulmonary effort is normal. No respiratory distress.      Breath sounds: Normal breath sounds. No wheezing or rales.   Chest:      Chest wall: No tenderness.   Abdominal:      General: Bowel sounds are normal. There is no distension.      Palpations: Abdomen is soft. Abdomen is not rigid. There is no mass.      Tenderness: There is no abdominal tenderness. There is no guarding or rebound. Negative signs include Chen's sign and McBurney's sign.   Musculoskeletal:      Cervical back: Normal range of motion and neck supple.   Lymphadenopathy:      Cervical: No cervical adenopathy.   Skin:     General: Skin is warm and dry.   Neurological:      Mental Status: She is alert and oriented to person, place, and time.      Cranial Nerves: No cranial nerve deficit.      Deep Tendon Reflexes: Reflexes are normal and symmetric.   Psychiatric:         Behavior: Behavior normal. Behavior is cooperative.         Thought Content: Thought  content normal.         Judgment: Judgment normal.             Osiel Tavarez PA-C  2/15/2022, 10:07 AM

## 2022-05-21 ENCOUNTER — HEALTH MAINTENANCE LETTER (OUTPATIENT)
Age: 22
End: 2022-05-21

## 2022-07-10 ENCOUNTER — ANESTHESIA EVENT (OUTPATIENT)
Dept: OBGYN | Facility: CLINIC | Age: 22
End: 2022-07-10
Payer: COMMERCIAL

## 2022-07-10 ENCOUNTER — ANESTHESIA (OUTPATIENT)
Dept: OBGYN | Facility: CLINIC | Age: 22
End: 2022-07-10
Payer: COMMERCIAL

## 2022-07-10 ENCOUNTER — HOSPITAL ENCOUNTER (INPATIENT)
Facility: CLINIC | Age: 22
LOS: 4 days | Discharge: HOME HEALTH | End: 2022-07-14
Attending: OBSTETRICS & GYNECOLOGY | Admitting: OBSTETRICS & GYNECOLOGY
Payer: COMMERCIAL

## 2022-07-10 DIAGNOSIS — Z86.718 PERSONAL HISTORY OF DVT (DEEP VEIN THROMBOSIS): ICD-10-CM

## 2022-07-10 DIAGNOSIS — O10.919 CHRONIC HYPERTENSION IN PREGNANCY: ICD-10-CM

## 2022-07-10 DIAGNOSIS — Z98.891 S/P CESAREAN SECTION: ICD-10-CM

## 2022-07-10 DIAGNOSIS — S37.69XA RUPTURE OF UTERUS, INITIAL ENCOUNTER: Primary | ICD-10-CM

## 2022-07-10 LAB
ABO/RH(D): NORMAL
ALT SERPL W P-5'-P-CCNC: 29 U/L (ref 0–50)
ANTIBODY SCREEN: NEGATIVE
APTT PPP: 24 SECONDS (ref 22–38)
APTT PPP: <20 SECONDS (ref 22–38)
AST SERPL W P-5'-P-CCNC: 20 U/L (ref 0–45)
BASOPHILS # BLD AUTO: 0 10E3/UL (ref 0–0.2)
BASOPHILS # BLD MANUAL: 0 10E3/UL (ref 0–0.2)
BASOPHILS NFR BLD AUTO: 0 %
BASOPHILS NFR BLD MANUAL: 0 %
BLD PROD TYP BPU: NORMAL
BLD PROD TYP BPU: NORMAL
BLOOD COMPONENT TYPE: NORMAL
BLOOD COMPONENT TYPE: NORMAL
BURR CELLS BLD QL SMEAR: ABNORMAL
CODING SYSTEM: NORMAL
CODING SYSTEM: NORMAL
CREAT SERPL-MCNC: 0.61 MG/DL (ref 0.52–1.04)
CREAT UR-MCNC: 125 MG/DL
CROSSMATCH: NORMAL
CROSSMATCH: NORMAL
EOSINOPHIL # BLD AUTO: 0 10E3/UL (ref 0–0.7)
EOSINOPHIL # BLD MANUAL: 0.3 10E3/UL (ref 0–0.7)
EOSINOPHIL NFR BLD AUTO: 0 %
EOSINOPHIL NFR BLD MANUAL: 1 %
ERYTHROCYTE [DISTWIDTH] IN BLOOD BY AUTOMATED COUNT: 13.7 % (ref 10–15)
ERYTHROCYTE [DISTWIDTH] IN BLOOD BY AUTOMATED COUNT: 13.7 % (ref 10–15)
ERYTHROCYTE [DISTWIDTH] IN BLOOD BY AUTOMATED COUNT: 13.8 % (ref 10–15)
FIBRINOGEN PPP-MCNC: 294 MG/DL (ref 170–490)
GFR SERPL CREATININE-BSD FRML MDRD: >90 ML/MIN/1.73M2
HCT VFR BLD AUTO: 22.9 % (ref 35–47)
HCT VFR BLD AUTO: 24.4 % (ref 35–47)
HCT VFR BLD AUTO: 25.4 % (ref 35–47)
HGB BLD-MCNC: 7 G/DL (ref 11.7–15.7)
HGB BLD-MCNC: 7.3 G/DL (ref 11.7–15.7)
HGB BLD-MCNC: 7.6 G/DL (ref 11.7–15.7)
HGB BLD-MCNC: 7.7 G/DL (ref 11.7–15.7)
HGB BLD-MCNC: 8.3 G/DL (ref 11.7–15.7)
HIV 1+2 AB+HIV1P24 AG SERPLBLD IA.RAPID: NON REACTIVE
HIV 1+2 AB+HIV1P24 AG SERPLBLD IA.RAPID: NON REACTIVE
HIV INTERPRETATION: NORMAL
HOLD SPECIMEN: NORMAL
IMM GRANULOCYTES # BLD: 0.1 10E3/UL
IMM GRANULOCYTES NFR BLD: 0 %
INR PPP: 1.16 (ref 0.85–1.15)
INR PPP: 1.21 (ref 0.85–1.15)
ISSUE DATE AND TIME: NORMAL
ISSUE DATE AND TIME: NORMAL
LYMPHOCYTES # BLD AUTO: 1.7 10E3/UL (ref 0.8–5.3)
LYMPHOCYTES # BLD MANUAL: 4.4 10E3/UL (ref 0.8–5.3)
LYMPHOCYTES NFR BLD AUTO: 11 %
LYMPHOCYTES NFR BLD MANUAL: 17 %
MCH RBC QN AUTO: 26.9 PG (ref 26.5–33)
MCH RBC QN AUTO: 26.9 PG (ref 26.5–33)
MCH RBC QN AUTO: 27.2 PG (ref 26.5–33)
MCHC RBC AUTO-ENTMCNC: 31.6 G/DL (ref 31.5–36.5)
MCHC RBC AUTO-ENTMCNC: 32.7 G/DL (ref 31.5–36.5)
MCHC RBC AUTO-ENTMCNC: 33.2 G/DL (ref 31.5–36.5)
MCV RBC AUTO: 81 FL (ref 78–100)
MCV RBC AUTO: 83 FL (ref 78–100)
MCV RBC AUTO: 86 FL (ref 78–100)
MONOCYTES # BLD AUTO: 0.7 10E3/UL (ref 0–1.3)
MONOCYTES # BLD MANUAL: 0.8 10E3/UL (ref 0–1.3)
MONOCYTES NFR BLD AUTO: 5 %
MONOCYTES NFR BLD MANUAL: 3 %
NEUTROPHILS # BLD AUTO: 12.9 10E3/UL (ref 1.6–8.3)
NEUTROPHILS # BLD MANUAL: 20.4 10E3/UL (ref 1.6–8.3)
NEUTROPHILS NFR BLD AUTO: 84 %
NEUTROPHILS NFR BLD MANUAL: 79 %
NRBC # BLD AUTO: 0 10E3/UL
NRBC BLD AUTO-RTO: 0 /100
PLAT MORPH BLD: ABNORMAL
PLATELET # BLD AUTO: 203 10E3/UL (ref 150–450)
PLATELET # BLD AUTO: 217 10E3/UL (ref 150–450)
PLATELET # BLD AUTO: 282 10E3/UL (ref 150–450)
PROT UR-MCNC: 0.41 G/L
PROT/CREAT 24H UR: 0.33 G/G CR (ref 0–0.2)
RBC # BLD AUTO: 2.83 10E6/UL (ref 3.8–5.2)
RBC # BLD AUTO: 2.83 10E6/UL (ref 3.8–5.2)
RBC # BLD AUTO: 3.08 10E6/UL (ref 3.8–5.2)
RBC MORPH BLD: ABNORMAL
SARS-COV-2 RNA RESP QL NAA+PROBE: NEGATIVE
SPECIMEN EXPIRATION DATE: NORMAL
T PALLIDUM AB SER QL: NONREACTIVE
UNIT ABO/RH: NORMAL
UNIT ABO/RH: NORMAL
UNIT NUMBER: NORMAL
UNIT NUMBER: NORMAL
UNIT STATUS: NORMAL
UNIT STATUS: NORMAL
UNIT TYPE ISBT: 7300
UNIT TYPE ISBT: 7300
WBC # BLD AUTO: 15.4 10E3/UL (ref 4–11)
WBC # BLD AUTO: 24 10E3/UL (ref 4–11)
WBC # BLD AUTO: 25.8 10E3/UL (ref 4–11)

## 2022-07-10 PROCEDURE — 120N000002 HC R&B MED SURG/OB UMMC

## 2022-07-10 PROCEDURE — 36415 COLL VENOUS BLD VENIPUNCTURE: CPT | Performed by: OBSTETRICS & GYNECOLOGY

## 2022-07-10 PROCEDURE — U0005 INFEC AGEN DETEC AMPLI PROBE: HCPCS | Performed by: OBSTETRICS & GYNECOLOGY

## 2022-07-10 PROCEDURE — 272N000001 HC OR GENERAL SUPPLY STERILE: Performed by: OBSTETRICS & GYNECOLOGY

## 2022-07-10 PROCEDURE — 84460 ALANINE AMINO (ALT) (SGPT): CPT | Performed by: STUDENT IN AN ORGANIZED HEALTH CARE EDUCATION/TRAINING PROGRAM

## 2022-07-10 PROCEDURE — 87340 HEPATITIS B SURFACE AG IA: CPT | Performed by: OBSTETRICS & GYNECOLOGY

## 2022-07-10 PROCEDURE — 250N000009 HC RX 250

## 2022-07-10 PROCEDURE — 258N000003 HC RX IP 258 OP 636

## 2022-07-10 PROCEDURE — 360N000076 HC SURGERY LEVEL 3, PER MIN: Performed by: OBSTETRICS & GYNECOLOGY

## 2022-07-10 PROCEDURE — 250N000011 HC RX IP 250 OP 636: Performed by: ANESTHESIOLOGY

## 2022-07-10 PROCEDURE — 86923 COMPATIBILITY TEST ELECTRIC: CPT | Performed by: STUDENT IN AN ORGANIZED HEALTH CARE EDUCATION/TRAINING PROGRAM

## 2022-07-10 PROCEDURE — 250N000013 HC RX MED GY IP 250 OP 250 PS 637

## 2022-07-10 PROCEDURE — 82565 ASSAY OF CREATININE: CPT | Performed by: STUDENT IN AN ORGANIZED HEALTH CARE EDUCATION/TRAINING PROGRAM

## 2022-07-10 PROCEDURE — 85018 HEMOGLOBIN: CPT | Performed by: STUDENT IN AN ORGANIZED HEALTH CARE EDUCATION/TRAINING PROGRAM

## 2022-07-10 PROCEDURE — 85027 COMPLETE CBC AUTOMATED: CPT | Performed by: OBSTETRICS & GYNECOLOGY

## 2022-07-10 PROCEDURE — 36415 COLL VENOUS BLD VENIPUNCTURE: CPT

## 2022-07-10 PROCEDURE — 59514 CESAREAN DELIVERY ONLY: CPT | Mod: GC | Performed by: OBSTETRICS & GYNECOLOGY

## 2022-07-10 PROCEDURE — 84450 TRANSFERASE (AST) (SGOT): CPT | Performed by: STUDENT IN AN ORGANIZED HEALTH CARE EDUCATION/TRAINING PROGRAM

## 2022-07-10 PROCEDURE — 36415 COLL VENOUS BLD VENIPUNCTURE: CPT | Performed by: STUDENT IN AN ORGANIZED HEALTH CARE EDUCATION/TRAINING PROGRAM

## 2022-07-10 PROCEDURE — P9016 RBC LEUKOCYTES REDUCED: HCPCS | Performed by: STUDENT IN AN ORGANIZED HEALTH CARE EDUCATION/TRAINING PROGRAM

## 2022-07-10 PROCEDURE — 370N000017 HC ANESTHESIA TECHNICAL FEE, PER MIN: Performed by: OBSTETRICS & GYNECOLOGY

## 2022-07-10 PROCEDURE — 85007 BL SMEAR W/DIFF WBC COUNT: CPT | Performed by: OBSTETRICS & GYNECOLOGY

## 2022-07-10 PROCEDURE — 250N000013 HC RX MED GY IP 250 OP 250 PS 637: Performed by: OBSTETRICS & GYNECOLOGY

## 2022-07-10 PROCEDURE — 86901 BLOOD TYPING SEROLOGIC RH(D): CPT | Performed by: OBSTETRICS & GYNECOLOGY

## 2022-07-10 PROCEDURE — 87806 HIV AG W/HIV1&2 ANTB W/OPTIC: CPT | Performed by: OBSTETRICS & GYNECOLOGY

## 2022-07-10 PROCEDURE — 84156 ASSAY OF PROTEIN URINE: CPT | Performed by: STUDENT IN AN ORGANIZED HEALTH CARE EDUCATION/TRAINING PROGRAM

## 2022-07-10 PROCEDURE — 85384 FIBRINOGEN ACTIVITY: CPT | Performed by: OBSTETRICS & GYNECOLOGY

## 2022-07-10 PROCEDURE — 85018 HEMOGLOBIN: CPT

## 2022-07-10 PROCEDURE — 250N000025 HC SEVOFLURANE, PER MIN: Performed by: OBSTETRICS & GYNECOLOGY

## 2022-07-10 PROCEDURE — 250N000013 HC RX MED GY IP 250 OP 250 PS 637: Performed by: STUDENT IN AN ORGANIZED HEALTH CARE EDUCATION/TRAINING PROGRAM

## 2022-07-10 PROCEDURE — 250N000011 HC RX IP 250 OP 636

## 2022-07-10 PROCEDURE — 710N000011 HC RECOVERY PHASE 1, LEVEL 3, PER MIN: Performed by: OBSTETRICS & GYNECOLOGY

## 2022-07-10 PROCEDURE — 86923 COMPATIBILITY TEST ELECTRIC: CPT

## 2022-07-10 PROCEDURE — 250N000009 HC RX 250: Performed by: NURSE ANESTHETIST, CERTIFIED REGISTERED

## 2022-07-10 PROCEDURE — 250N000011 HC RX IP 250 OP 636: Performed by: NURSE ANESTHETIST, CERTIFIED REGISTERED

## 2022-07-10 PROCEDURE — 85730 THROMBOPLASTIN TIME PARTIAL: CPT | Performed by: OBSTETRICS & GYNECOLOGY

## 2022-07-10 PROCEDURE — 86762 RUBELLA ANTIBODY: CPT | Performed by: OBSTETRICS & GYNECOLOGY

## 2022-07-10 PROCEDURE — 86780 TREPONEMA PALLIDUM: CPT | Performed by: OBSTETRICS & GYNECOLOGY

## 2022-07-10 PROCEDURE — 271N000001 HC OR GENERAL SUPPLY NON-STERILE: Performed by: OBSTETRICS & GYNECOLOGY

## 2022-07-10 PROCEDURE — 85610 PROTHROMBIN TIME: CPT | Performed by: OBSTETRICS & GYNECOLOGY

## 2022-07-10 PROCEDURE — 258N000003 HC RX IP 258 OP 636: Performed by: NURSE ANESTHETIST, CERTIFIED REGISTERED

## 2022-07-10 RX ORDER — CEFAZOLIN SODIUM 1 G/3ML
INJECTION, POWDER, FOR SOLUTION INTRAMUSCULAR; INTRAVENOUS PRN
Status: DISCONTINUED | OUTPATIENT
Start: 2022-07-10 | End: 2022-07-10

## 2022-07-10 RX ORDER — PROCHLORPERAZINE 25 MG
25 SUPPOSITORY, RECTAL RECTAL EVERY 12 HOURS PRN
Status: DISCONTINUED | OUTPATIENT
Start: 2022-07-10 | End: 2022-07-14 | Stop reason: HOSPADM

## 2022-07-10 RX ORDER — SODIUM CHLORIDE, SODIUM LACTATE, POTASSIUM CHLORIDE, CALCIUM CHLORIDE 600; 310; 30; 20 MG/100ML; MG/100ML; MG/100ML; MG/100ML
INJECTION, SOLUTION INTRAVENOUS CONTINUOUS PRN
Status: DISCONTINUED | OUTPATIENT
Start: 2022-07-10 | End: 2022-07-10

## 2022-07-10 RX ORDER — ACETAMINOPHEN 325 MG/1
975 TABLET ORAL EVERY 6 HOURS
Status: DISPENSED | OUTPATIENT
Start: 2022-07-10 | End: 2022-07-13

## 2022-07-10 RX ORDER — SODIUM CHLORIDE, SODIUM LACTATE, POTASSIUM CHLORIDE, CALCIUM CHLORIDE 600; 310; 30; 20 MG/100ML; MG/100ML; MG/100ML; MG/100ML
INJECTION, SOLUTION INTRAVENOUS CONTINUOUS
Status: DISCONTINUED | OUTPATIENT
Start: 2022-07-10 | End: 2022-07-10 | Stop reason: HOSPADM

## 2022-07-10 RX ORDER — OXYTOCIN/0.9 % SODIUM CHLORIDE 30/500 ML
340 PLASTIC BAG, INJECTION (ML) INTRAVENOUS CONTINUOUS PRN
Status: DISCONTINUED | OUTPATIENT
Start: 2022-07-10 | End: 2022-07-14 | Stop reason: HOSPADM

## 2022-07-10 RX ORDER — METHYLERGONOVINE MALEATE 0.2 MG/ML
200 INJECTION INTRAVENOUS
Status: DISCONTINUED | OUTPATIENT
Start: 2022-07-10 | End: 2022-07-14 | Stop reason: HOSPADM

## 2022-07-10 RX ORDER — CARBOPROST TROMETHAMINE 250 UG/ML
250 INJECTION, SOLUTION INTRAMUSCULAR
Status: DISCONTINUED | OUTPATIENT
Start: 2022-07-10 | End: 2022-07-14 | Stop reason: HOSPADM

## 2022-07-10 RX ORDER — FENTANYL CITRATE-0.9 % NACL/PF 10 MCG/ML
PLASTIC BAG, INJECTION (ML) INTRAVENOUS CONTINUOUS PRN
Status: DISCONTINUED | OUTPATIENT
Start: 2022-07-10 | End: 2022-07-10

## 2022-07-10 RX ORDER — OXYCODONE HYDROCHLORIDE 5 MG/1
5 TABLET ORAL EVERY 4 HOURS PRN
Status: DISCONTINUED | OUTPATIENT
Start: 2022-07-10 | End: 2022-07-14 | Stop reason: HOSPADM

## 2022-07-10 RX ORDER — ONDANSETRON 4 MG/1
4 TABLET, ORALLY DISINTEGRATING ORAL EVERY 6 HOURS PRN
Status: DISCONTINUED | OUTPATIENT
Start: 2022-07-10 | End: 2022-07-14 | Stop reason: HOSPADM

## 2022-07-10 RX ORDER — METHYLERGONOVINE MALEATE 0.2 MG/ML
INJECTION INTRAVENOUS
Status: DISCONTINUED
Start: 2022-07-10 | End: 2022-07-10 | Stop reason: WASHOUT

## 2022-07-10 RX ORDER — METOCLOPRAMIDE HYDROCHLORIDE 5 MG/ML
10 INJECTION INTRAMUSCULAR; INTRAVENOUS EVERY 6 HOURS PRN
Status: DISCONTINUED | OUTPATIENT
Start: 2022-07-10 | End: 2022-07-14 | Stop reason: HOSPADM

## 2022-07-10 RX ORDER — TRANEXAMIC ACID 10 MG/ML
1 INJECTION, SOLUTION INTRAVENOUS EVERY 30 MIN PRN
Status: DISCONTINUED | OUTPATIENT
Start: 2022-07-10 | End: 2022-07-14 | Stop reason: HOSPADM

## 2022-07-10 RX ORDER — HYDROMORPHONE HCL IN WATER/PF 6 MG/30 ML
0.2 PATIENT CONTROLLED ANALGESIA SYRINGE INTRAVENOUS EVERY 5 MIN PRN
Status: DISCONTINUED | OUTPATIENT
Start: 2022-07-10 | End: 2022-07-10 | Stop reason: HOSPADM

## 2022-07-10 RX ORDER — MISOPROSTOL 200 UG/1
800 TABLET ORAL
Status: DISCONTINUED | OUTPATIENT
Start: 2022-07-10 | End: 2022-07-14 | Stop reason: HOSPADM

## 2022-07-10 RX ORDER — NALOXONE HYDROCHLORIDE 0.4 MG/ML
0.2 INJECTION, SOLUTION INTRAMUSCULAR; INTRAVENOUS; SUBCUTANEOUS
Status: DISCONTINUED | OUTPATIENT
Start: 2022-07-10 | End: 2022-07-14 | Stop reason: HOSPADM

## 2022-07-10 RX ORDER — DIPHENHYDRAMINE HYDROCHLORIDE 50 MG/ML
25 INJECTION INTRAMUSCULAR; INTRAVENOUS EVERY 6 HOURS PRN
Status: DISCONTINUED | OUTPATIENT
Start: 2022-07-10 | End: 2022-07-10 | Stop reason: HOSPADM

## 2022-07-10 RX ORDER — BISACODYL 10 MG
10 SUPPOSITORY, RECTAL RECTAL DAILY PRN
Status: DISCONTINUED | OUTPATIENT
Start: 2022-07-12 | End: 2022-07-14 | Stop reason: HOSPADM

## 2022-07-10 RX ORDER — ACETAMINOPHEN 325 MG/1
975 TABLET ORAL ONCE
Status: COMPLETED | OUTPATIENT
Start: 2022-07-10 | End: 2022-07-10

## 2022-07-10 RX ORDER — DIPHENHYDRAMINE HCL 25 MG
25 CAPSULE ORAL EVERY 6 HOURS PRN
Status: DISCONTINUED | OUTPATIENT
Start: 2022-07-10 | End: 2022-07-10 | Stop reason: HOSPADM

## 2022-07-10 RX ORDER — FENTANYL CITRATE 50 UG/ML
25 INJECTION, SOLUTION INTRAMUSCULAR; INTRAVENOUS EVERY 5 MIN PRN
Status: DISCONTINUED | OUTPATIENT
Start: 2022-07-10 | End: 2022-07-10 | Stop reason: HOSPADM

## 2022-07-10 RX ORDER — OXYTOCIN/0.9 % SODIUM CHLORIDE 30/500 ML
PLASTIC BAG, INJECTION (ML) INTRAVENOUS CONTINUOUS PRN
Status: DISCONTINUED | OUTPATIENT
Start: 2022-07-10 | End: 2022-07-10

## 2022-07-10 RX ORDER — NIFEDIPINE 30 MG/1
30 TABLET, EXTENDED RELEASE ORAL DAILY
Status: DISCONTINUED | OUTPATIENT
Start: 2022-07-10 | End: 2022-07-12

## 2022-07-10 RX ORDER — METOCLOPRAMIDE 10 MG/1
10 TABLET ORAL EVERY 6 HOURS PRN
Status: DISCONTINUED | OUTPATIENT
Start: 2022-07-10 | End: 2022-07-14 | Stop reason: HOSPADM

## 2022-07-10 RX ORDER — PROCHLORPERAZINE MALEATE 10 MG
10 TABLET ORAL EVERY 6 HOURS PRN
Status: DISCONTINUED | OUTPATIENT
Start: 2022-07-10 | End: 2022-07-14 | Stop reason: HOSPADM

## 2022-07-10 RX ORDER — ONDANSETRON 2 MG/ML
4 INJECTION INTRAMUSCULAR; INTRAVENOUS EVERY 6 HOURS PRN
Status: DISCONTINUED | OUTPATIENT
Start: 2022-07-10 | End: 2022-07-14 | Stop reason: HOSPADM

## 2022-07-10 RX ORDER — OXYTOCIN 10 [USP'U]/ML
10 INJECTION, SOLUTION INTRAMUSCULAR; INTRAVENOUS
Status: DISCONTINUED | OUTPATIENT
Start: 2022-07-10 | End: 2022-07-14 | Stop reason: HOSPADM

## 2022-07-10 RX ORDER — MEPERIDINE HYDROCHLORIDE 25 MG/ML
12.5 INJECTION INTRAMUSCULAR; INTRAVENOUS; SUBCUTANEOUS EVERY 5 MIN PRN
Status: DISCONTINUED | OUTPATIENT
Start: 2022-07-10 | End: 2022-07-10 | Stop reason: HOSPADM

## 2022-07-10 RX ORDER — ALBUTEROL SULFATE 0.83 MG/ML
2.5 SOLUTION RESPIRATORY (INHALATION) EVERY 4 HOURS PRN
Status: DISCONTINUED | OUTPATIENT
Start: 2022-07-10 | End: 2022-07-10 | Stop reason: HOSPADM

## 2022-07-10 RX ORDER — MODIFIED LANOLIN
OINTMENT (GRAM) TOPICAL
Status: DISCONTINUED | OUTPATIENT
Start: 2022-07-10 | End: 2022-07-14 | Stop reason: HOSPADM

## 2022-07-10 RX ORDER — IBUPROFEN 800 MG/1
800 TABLET, FILM COATED ORAL EVERY 6 HOURS PRN
Status: DISCONTINUED | OUTPATIENT
Start: 2022-07-10 | End: 2022-07-14 | Stop reason: HOSPADM

## 2022-07-10 RX ORDER — CARBOPROST TROMETHAMINE 250 UG/ML
INJECTION, SOLUTION INTRAMUSCULAR
Status: DISCONTINUED
Start: 2022-07-10 | End: 2022-07-10 | Stop reason: WASHOUT

## 2022-07-10 RX ORDER — DEXTROSE, SODIUM CHLORIDE, SODIUM LACTATE, POTASSIUM CHLORIDE, AND CALCIUM CHLORIDE 5; .6; .31; .03; .02 G/100ML; G/100ML; G/100ML; G/100ML; G/100ML
INJECTION, SOLUTION INTRAVENOUS CONTINUOUS
Status: DISCONTINUED | OUTPATIENT
Start: 2022-07-10 | End: 2022-07-14 | Stop reason: HOSPADM

## 2022-07-10 RX ORDER — SODIUM CHLORIDE, SODIUM LACTATE, POTASSIUM CHLORIDE, CALCIUM CHLORIDE 600; 310; 30; 20 MG/100ML; MG/100ML; MG/100ML; MG/100ML
INJECTION, SOLUTION INTRAVENOUS
Status: DISCONTINUED
Start: 2022-07-10 | End: 2022-07-10 | Stop reason: HOSPADM

## 2022-07-10 RX ORDER — ONDANSETRON 2 MG/ML
INJECTION INTRAMUSCULAR; INTRAVENOUS PRN
Status: DISCONTINUED | OUTPATIENT
Start: 2022-07-10 | End: 2022-07-10

## 2022-07-10 RX ORDER — AMOXICILLIN 250 MG
1 CAPSULE ORAL 2 TIMES DAILY
Status: DISCONTINUED | OUTPATIENT
Start: 2022-07-10 | End: 2022-07-14 | Stop reason: HOSPADM

## 2022-07-10 RX ORDER — AMOXICILLIN 250 MG
2 CAPSULE ORAL 2 TIMES DAILY
Status: DISCONTINUED | OUTPATIENT
Start: 2022-07-10 | End: 2022-07-14 | Stop reason: HOSPADM

## 2022-07-10 RX ORDER — LABETALOL HYDROCHLORIDE 5 MG/ML
10 INJECTION, SOLUTION INTRAVENOUS
Status: DISCONTINUED | OUTPATIENT
Start: 2022-07-10 | End: 2022-07-10 | Stop reason: HOSPADM

## 2022-07-10 RX ORDER — HYDROMORPHONE HYDROCHLORIDE 1 MG/ML
.3-.5 INJECTION, SOLUTION INTRAMUSCULAR; INTRAVENOUS; SUBCUTANEOUS EVERY 30 MIN PRN
Status: DISCONTINUED | OUTPATIENT
Start: 2022-07-10 | End: 2022-07-14 | Stop reason: HOSPADM

## 2022-07-10 RX ORDER — SIMETHICONE 80 MG
80 TABLET,CHEWABLE ORAL 4 TIMES DAILY PRN
Status: DISCONTINUED | OUTPATIENT
Start: 2022-07-10 | End: 2022-07-14 | Stop reason: HOSPADM

## 2022-07-10 RX ORDER — HYDROCORTISONE 25 MG/G
CREAM TOPICAL 3 TIMES DAILY PRN
Status: DISCONTINUED | OUTPATIENT
Start: 2022-07-10 | End: 2022-07-14 | Stop reason: HOSPADM

## 2022-07-10 RX ORDER — MISOPROSTOL 200 UG/1
400 TABLET ORAL
Status: DISCONTINUED | OUTPATIENT
Start: 2022-07-10 | End: 2022-07-14 | Stop reason: HOSPADM

## 2022-07-10 RX ORDER — BUPIVACAINE HYDROCHLORIDE 2.5 MG/ML
INJECTION, SOLUTION EPIDURAL; INFILTRATION; INTRACAUDAL
Status: COMPLETED | OUTPATIENT
Start: 2022-07-10 | End: 2022-07-10

## 2022-07-10 RX ORDER — LIDOCAINE 40 MG/G
CREAM TOPICAL
Status: DISCONTINUED | OUTPATIENT
Start: 2022-07-10 | End: 2022-07-14 | Stop reason: HOSPADM

## 2022-07-10 RX ORDER — ACETAMINOPHEN 325 MG/1
650 TABLET ORAL EVERY 4 HOURS PRN
Status: DISCONTINUED | OUTPATIENT
Start: 2022-07-13 | End: 2022-07-14 | Stop reason: HOSPADM

## 2022-07-10 RX ORDER — NALOXONE HYDROCHLORIDE 0.4 MG/ML
0.4 INJECTION, SOLUTION INTRAMUSCULAR; INTRAVENOUS; SUBCUTANEOUS
Status: DISCONTINUED | OUTPATIENT
Start: 2022-07-10 | End: 2022-07-14 | Stop reason: HOSPADM

## 2022-07-10 RX ADMIN — HYDROMORPHONE HYDROCHLORIDE 0.5 MG: 1 INJECTION, SOLUTION INTRAMUSCULAR; INTRAVENOUS; SUBCUTANEOUS at 03:39

## 2022-07-10 RX ADMIN — HYDROMORPHONE HYDROCHLORIDE 0.2 MG: 0.2 INJECTION, SOLUTION INTRAMUSCULAR; INTRAVENOUS; SUBCUTANEOUS at 05:39

## 2022-07-10 RX ADMIN — FENTANYL CITRATE 25 MCG: 50 INJECTION, SOLUTION INTRAMUSCULAR; INTRAVENOUS at 04:37

## 2022-07-10 RX ADMIN — ACETAMINOPHEN 975 MG: 325 TABLET, FILM COATED ORAL at 20:19

## 2022-07-10 RX ADMIN — HYDROMORPHONE HYDROCHLORIDE 0.2 MG: 0.2 INJECTION, SOLUTION INTRAMUSCULAR; INTRAVENOUS; SUBCUTANEOUS at 05:29

## 2022-07-10 RX ADMIN — PHENYLEPHRINE HYDROCHLORIDE 100 MCG: 10 INJECTION INTRAVENOUS at 03:56

## 2022-07-10 RX ADMIN — OXYCODONE HYDROCHLORIDE 5 MG: 5 TABLET ORAL at 10:07

## 2022-07-10 RX ADMIN — HYDROMORPHONE HYDROCHLORIDE 0.2 MG: 0.2 INJECTION, SOLUTION INTRAMUSCULAR; INTRAVENOUS; SUBCUTANEOUS at 05:21

## 2022-07-10 RX ADMIN — CEFAZOLIN 2 G: 1 INJECTION, POWDER, FOR SOLUTION INTRAMUSCULAR; INTRAVENOUS at 02:53

## 2022-07-10 RX ADMIN — AZITHROMYCIN MONOHYDRATE 500 MG: 500 INJECTION, POWDER, LYOPHILIZED, FOR SOLUTION INTRAVENOUS at 02:57

## 2022-07-10 RX ADMIN — HYDROMORPHONE HYDROCHLORIDE 0.2 MG: 0.2 INJECTION, SOLUTION INTRAMUSCULAR; INTRAVENOUS; SUBCUTANEOUS at 07:46

## 2022-07-10 RX ADMIN — PHENYLEPHRINE HYDROCHLORIDE 200 MCG: 10 INJECTION INTRAVENOUS at 03:15

## 2022-07-10 RX ADMIN — HYDROMORPHONE HYDROCHLORIDE 1 MG: 1 INJECTION, SOLUTION INTRAMUSCULAR; INTRAVENOUS; SUBCUTANEOUS at 03:21

## 2022-07-10 RX ADMIN — OXYTOCIN-SODIUM CHLORIDE 0.9% IV SOLN 30 UNIT/500ML 600 ML/HR: 30-0.9/5 SOLUTION at 02:58

## 2022-07-10 RX ADMIN — OXYCODONE HYDROCHLORIDE 5 MG: 5 TABLET ORAL at 17:44

## 2022-07-10 RX ADMIN — ACETAMINOPHEN 975 MG: 325 TABLET, FILM COATED ORAL at 13:47

## 2022-07-10 RX ADMIN — OXYCODONE HYDROCHLORIDE 5 MG: 5 TABLET ORAL at 13:47

## 2022-07-10 RX ADMIN — TRANEXAMIC ACID 1 G: 1 INJECTION, SOLUTION INTRAVENOUS at 03:33

## 2022-07-10 RX ADMIN — PHENYLEPHRINE HYDROCHLORIDE 100 MCG: 10 INJECTION INTRAVENOUS at 03:44

## 2022-07-10 RX ADMIN — SODIUM CHLORIDE, POTASSIUM CHLORIDE, SODIUM LACTATE AND CALCIUM CHLORIDE: 600; 310; 30; 20 INJECTION, SOLUTION INTRAVENOUS at 02:50

## 2022-07-10 RX ADMIN — FENTANYL CITRATE 25 MCG: 50 INJECTION, SOLUTION INTRAMUSCULAR; INTRAVENOUS at 04:54

## 2022-07-10 RX ADMIN — HYDROMORPHONE HYDROCHLORIDE 0.2 MG: 0.2 INJECTION, SOLUTION INTRAMUSCULAR; INTRAVENOUS; SUBCUTANEOUS at 06:21

## 2022-07-10 RX ADMIN — ONDANSETRON 4 MG: 2 INJECTION INTRAMUSCULAR; INTRAVENOUS at 03:40

## 2022-07-10 RX ADMIN — Medication 100 MCG/MIN: at 03:17

## 2022-07-10 RX ADMIN — FENTANYL CITRATE 25 MCG: 50 INJECTION, SOLUTION INTRAMUSCULAR; INTRAVENOUS at 05:04

## 2022-07-10 RX ADMIN — ACETAMINOPHEN 325MG 975 MG: 325 TABLET ORAL at 06:20

## 2022-07-10 RX ADMIN — BUPIVACAINE HYDROCHLORIDE 20 ML: 2.5 INJECTION, SOLUTION EPIDURAL; INFILTRATION; INTRACAUDAL at 08:32

## 2022-07-10 RX ADMIN — PHENYLEPHRINE HYDROCHLORIDE 200 MCG: 10 INJECTION INTRAVENOUS at 03:37

## 2022-07-10 RX ADMIN — SODIUM CHLORIDE, POTASSIUM CHLORIDE, SODIUM LACTATE AND CALCIUM CHLORIDE: 600; 310; 30; 20 INJECTION, SOLUTION INTRAVENOUS at 03:22

## 2022-07-10 RX ADMIN — HYDROMORPHONE HYDROCHLORIDE 0.2 MG: 0.2 INJECTION, SOLUTION INTRAMUSCULAR; INTRAVENOUS; SUBCUTANEOUS at 05:11

## 2022-07-10 RX ADMIN — NIFEDIPINE 30 MG: 30 TABLET, FILM COATED, EXTENDED RELEASE ORAL at 06:57

## 2022-07-10 RX ADMIN — HYDROMORPHONE HYDROCHLORIDE 0.2 MG: 0.2 INJECTION, SOLUTION INTRAMUSCULAR; INTRAVENOUS; SUBCUTANEOUS at 05:50

## 2022-07-10 RX ADMIN — IBUPROFEN 800 MG: 800 TABLET, FILM COATED ORAL at 20:19

## 2022-07-10 RX ADMIN — OXYCODONE HYDROCHLORIDE 5 MG: 5 TABLET ORAL at 23:12

## 2022-07-10 RX ADMIN — FENTANYL CITRATE 25 MCG: 50 INJECTION, SOLUTION INTRAMUSCULAR; INTRAVENOUS at 04:43

## 2022-07-10 RX ADMIN — SENNOSIDES AND DOCUSATE SODIUM 2 TABLET: 50; 8.6 TABLET ORAL at 20:19

## 2022-07-10 ASSESSMENT — ACTIVITIES OF DAILY LIVING (ADL)
ADLS_ACUITY_SCORE: 22
ADLS_ACUITY_SCORE: 22
ADLS_ACUITY_SCORE: 21
ADLS_ACUITY_SCORE: 22
ADLS_ACUITY_SCORE: 18
ADLS_ACUITY_SCORE: 22
ADLS_ACUITY_SCORE: 18

## 2022-07-10 NOTE — PROVIDER NOTIFICATION
07/10/22 1653   Provider Notification   Provider Name/Title g3   Method of Notification Electronic Page   Request Evaluate-Remote   Notification Reason Status Update   putnam output 400 since arrival at 10am. Pt up to bathroom now without dizziness. Can putnam be removed?

## 2022-07-10 NOTE — PROVIDER NOTIFICATION
07/10/22 1231   Provider Notification   Provider Name/Title Niraj G3   Method of Notification Electronic Page   Request Evaluate-Remote   Notification Reason Other   Pt agreeable to blood transfusion

## 2022-07-10 NOTE — PROVIDER NOTIFICATION
07/10/22 0900   Provider Notification   Provider Name/Title Dr Healy   Method of Notification Phone   Notification Reason Other   Still awaiting pre-e labs to be drawn, verbal order from Dr Healy to release conditional order for CBC also.

## 2022-07-10 NOTE — ANESTHESIA PROCEDURE NOTES
TAP Procedure Note    Pre-Procedure   Staff -        Anesthesiologist:  Andreea Loomis MD       Resident/Fellow: Theresa Justice MD       Performed By: resident       Location: post-op       Procedure Start/Stop Times: 7/10/2022 8:32 AM and 7/10/2022 8:38 AM       Pre-Anesthestic Checklist: patient identified, IV checked, site marked, risks and benefits discussed, informed consent, monitors and equipment checked, pre-op evaluation, at physician/surgeon's request and post-op pain management  Timeout:       Correct Patient: Yes        Correct Procedure: Yes        Correct Site: Yes        Correct Position: Yes        Correct Laterality: Yes        Site Marked: Yes  Procedure Documentation  Procedure: TAP       Laterality: bilateral       Patient Position: supine       Patient Prep/Sterile Barriers: sterile gloves, mask       Skin prep: Chloraprep       Insertion Site: T8-9.       Needle Type: short bevel       Needle Gauge: 21.        Needle Length (millimeters): 110           Catheter threaded easily.             Ultrasound guided       1. Ultrasound was used to identify targeted nerve, plexus, vascular marker, or fascial plane and place a needle adjacent to it in real-time.       2. Ultrasound was used to visualize the spread of anesthetic in close proximity to the above referenced structure.       3. A permanent image is entered into the patient's record.    Assessment/Narrative         The placement was negative for: blood aspirated and painful injection       Paresthesias: No.       Bolus given via needle. no blood aspirated via catheter.        Secured via.        Insertion/Infusion Method: Single Shot       Complications: none    Medication(s) Administered   Bupivacaine 0.25% PF (Infiltration) - Infiltration   20 mL - 7/10/2022 8:32:00 AM  Bupivacaine liposome (Exparel) 1.3% LA inj susp (Infiltration) - Infiltration   20 mL - 7/10/2022 8:32:00 AM  Medication Administration Time: 7/10/2022 8:32  AM

## 2022-07-10 NOTE — H&P
History and Physical     Robinson Myers MRN# [id number]   YOB: 2000 Age: 21 year old      Date of Admission: 7/10/2022      Assessment and Plan:       Note entry delayed by acuity of patient presentation. STAT CS called from triage at 0246 for fetal bradycardia. Please see Operative Notes for details.     21 year old  at 27w5d by LMP 22, here for evaluation of abdominal pain. Pregnancy is notable for h/o two prior CS (one classical and one LTCS), short interval pregnancy, h/o  birth, asthma, and no prenatal care in this pregnancy. She reported that pain began several hours ago, occurring every 5 minutes or so and feels like contractions. Denied any vaginal bleeding, was feeling normal fetal movements.     Upon arrival, FHR initially noted to be in 120s but then became difficult to trace. BSUS brought into the room for assistance with getting fetal heart rate on monitor at which time FHR was noted to be in 60-70s. STAT CS immediately called, patient verbally consented for CS and blood transfusion as needed. Transferred to OR and CS performed. Please see op note for details of procedure.     PMH is additionally notable for h/o prior DVT and PE after 2019 delivery as well as history of asthma.     Dr. Oden updated of patient history prior to STAT CS performed.     Delia Weiner MD  OB/GYN Resident PGY-3  5:12 AM July 10, 2022      I personally examined and evaluated Robinson Myers on 7/10/2022.  I discussed the patient with Dr. Weiner and agree with the presentation, exam and plan of care documented in this note with edits by me.   Leola Oden MD MPH            HPI:     Robinson Myers is a 21 year old  at 27w5d by LMP who presented for evaluation of abdominal pain and contractions. Reported that contractions began several hours ago and were getting more intense, occurring every 5 minutes or so. Denies any vaginal bleeding or LOF. Has been feeling good  FM. Denied any other recent symptoms or concerns.     Review of records (Farnaz Means OB note from [facility] 22):  Her previous pregnancies were notable for multiple  deliveries and short interval pregnancies. She had a h/o CCS that was complicated by postpartum sepsis and pelvic abscess requiring drain placement, followed by precipitous  at 33 wks, and repeat LTCS at 34 wks for placental abruption. She had a history of postpartum pulmonary embolism in 2019 and provoked DVT in 2016. Additionally she had a history of asthma and HTN (though patient denied h/o HTN to me).      Pregnancy notable for:   - History of CSx2 (CCS, LVCS)  - Short interval pregnancy  - H/o prior  deliveries  - H/o PE, DVT   - Asthma    - No prenatal care    OB History:    OB History    Para Term  AB Living   5 5 0 5 0 4   SAB IAB Ectopic Multiple Live Births   0 0 0 0 4      # Outcome Date GA Lbr Sebastian/2nd Weight Sex Delivery Anes PTL Lv   5  07/10/22 27w5d   F CS-Classical Gen Y GIULIANA      Name: LOUIE,FEMALE-BAILEYLA      Apgar1: 1  Apgar5: 1   4  10/13/21 34w1d   M CS-LVertical Gen N GIULIANA      Name: LOUIEMALE-TAKAYLA      Apgar1: 3  Apgar5: 5   3  20 33w3d   M Vag-Spont None Y GIULIANA      Name: NURIA,BABY BOY      Apgar5: 8   2  19 24w4d  0.77 kg (1 lb 11.2 oz) F CS-Classical Spinal Y GIULIANA      Name: QUIÑONESEstefany      Apgar1: 4  Apgar5: 8   1   21w0d               Prenatal Lab Results:  No prenatal labs obtained during this pregnancy.              Past Medical History:     Past Medical History:   Diagnosis Date     Asthma      HDL deficiency      Obesity    Denies any h/o HTN or DM.          Past Surgical History:     Past Surgical History:   Procedure Laterality Date      SECTION N/A 10/13/2021    Procedure:  SECTION;  Surgeon: Cortney Bell MD;  Location: [facility] L[facility]D     NO HISTORY OF SURGERY     Denies any other abdominal  surgery besides CS.           Social History:     Social History     Tobacco Use     Smoking status: Former Smoker     Smokeless tobacco: Never Used   Substance Use Topics     Alcohol use: No           Family History:     Family History   Problem Relation Age of Onset     Family History Negative Mother         per patient             Immunizations:     Immunization History   Administered Date(s) Administered     Comvax (HIB/HepB) 02/22/2000     DTAP (<7y) 01/07/1999, 04/22/1999, 02/22/2000, 08/29/2000, 08/20/2003     FLU 6-35 months 11/07/2019     Flu, Unspecified 03/19/2013, 09/24/2015     HPV Quadrivalent 08/23/2012, 10/23/2012, 03/19/2013     Hep B, Peds or Adolescent 2000, 01/07/1999, 04/22/1999, 02/22/2000     HepA-ped 2 Dose 12/15/2014, 06/17/2015     HepB, Unspecified 01/07/1999     HepB-Adult 01/07/1999     Hib (PRP-T) 01/07/1999, 04/22/1999, 02/22/2000     Historic Hib Prohibit 04/22/1999     Influenza Vaccine IM > 6 months Valent IIV4 (Alfuria,Fluzone) 11/07/2019     MMR 02/22/2000, 08/20/2003     Meningococcal (Menactra ) 03/19/2013, 05/12/2015     Pneumococcal (PCV 7) 01/24/2001     Poliovirus, inactivated (IPV) 01/07/1999, 04/22/1999, 02/22/2000, 08/20/2003     Rotavirus, pentavalent 01/07/1999, 04/22/1999     Tdap (Adacel,Boostrix) 03/19/2013     Varicella 03/11/2002, 12/15/2014            Allergies:   No Known Allergies          Medications:     Medications Prior to Admission   Medication Sig Dispense Refill Last Dose     acetaminophen (TYLENOL) 325 MG tablet Take 2 tablets (650 mg) by mouth every 6 hours as needed for mild pain Start after Delivery. (Patient not taking: Reported on 7/10/2022) 100 tablet 0 Not Taking at Unknown time     acetaminophen (TYLENOL) 325 MG tablet Take 650 mg by mouth        albuterol (PROAIR HFA/PROVENTIL HFA/VENTOLIN HFA) 108 (90 Base) MCG/ACT inhaler Inhale 2 puffs into the lungs        fluticasone (FLOVENT HFA) 110 MCG/ACT inhaler Inhale 1 puff into the lungs 2  times daily        ibuprofen (ADVIL/MOTRIN) 600 MG tablet Take 1 tablet (600 mg) by mouth every 6 hours as needed for moderate pain Start after delivery (Patient not taking: Reported on 2/15/2022) 60 tablet 0      oxyCODONE (ROXICODONE) 5 MG tablet Take 1-2 tablets (5-10 mg) by mouth every 6 hours as needed for severe pain (Patient not taking: Reported on 2/15/2022) 18 tablet 0      Prenatal Vit-Fe Fumarate-FA (PRENATAL PLUS) 27-1 MG TABS Take 1 tablet by mouth (Patient not taking: Reported on 2/15/2022)        senna-docusate (SENOKOT-S/PERICOLACE) 8.6-50 MG tablet Take 1 tablet by mouth daily Start after delivery. (Patient not taking: Reported on 2/15/2022) 100 tablet 0      sodium chloride (OCEAN) 0.65 % nasal spray Spray 1 spray in nostril (Patient not taking: Reported on 2/15/2022)                Review of Systems & Physical Exam:     The Review of Systems is negative other than noted in the HPI      Vitals:    07/10/22 0430 07/10/22 0445 07/10/22 0500 07/10/22 0515   BP: (!) 150/70 (!) 145/65 (!) 146/71 (!) 153/75   BP Location:       Patient Position:       Cuff Size: Adult Regular Adult Regular Adult Regular Adult Regular   Pulse: 75 71 68 70   Resp: 16 16 16 16   Temp:       TempSrc:       SpO2: 100%  100% 100%     Gen: Uncomfortable appearing  CV: Warm and well perfused   Lungs: Breathing comfortably on room air   Abd: Gravid, obese, tender with palpation of abdomen on probe   Ext: Trace peripheral extremity edema    Cervix: Deferred  Membranes: Intact    BSUS: Transverse presentation, fetal head maternal right, FHR noted to be in 60-70s.     FHT:  Monitoring External  FHT: Baseline 120 bpm; minimal variability; no accels present; no decelerations > loss of fetal tones, discontinuous   TOCO 0 contractions in 10 minutes         Data:   No labs or imaging performed prior to STAT CS called.

## 2022-07-10 NOTE — ANESTHESIA CARE TRANSFER NOTE
Patient: Robinson Myers    Procedure: Procedure(s):   SECTION       Diagnosis: * No pre-op diagnosis entered *  Diagnosis Additional Information: No value filed.    Anesthesia Type:   No value filed.     Note:    Oropharynx: oropharynx clear of all foreign objects and spontaneously breathing  Level of Consciousness: drowsy  Oxygen Supplementation: face mask  Level of Supplemental Oxygen (L/min / FiO2): 6  Independent Airway: airway patency satisfactory and stable  Dentition: dentition unchanged  Vital Signs Stable: post-procedure vital signs reviewed and stable  Report to RN Given: handoff report given  Patient transferred to: PACU    Handoff Report: Identifed the Patient, Identified the Reponsible Provider, Reviewed the pertinent medical history, Discussed the surgical course, Reviewed Intra-OP anesthesia mangement and issues during anesthesia, Set expectations for post-procedure period and Allowed opportunity for questions and acknowledgement of understanding      Vitals:  Vitals Value Taken Time   /70 07/10/22 0430   Temp     Pulse 75 07/10/22 0430   Resp 16 07/10/22 0430   SpO2 100 % 07/10/22 0430       Electronically Signed By: Luca Vivar MD  July 10, 2022  4:44 AM

## 2022-07-10 NOTE — BRIEF OP NOTE
[facility]   BRIEF OPERATIVE NOTE:  SECTION    Surgery Date:  July 10, 2022   Surgeon(s): Leola Oden MD  Assistants:  Delia Weiner MD, PGY-3    Preoperative Diagnoses:  -  at 27w5d   - Fetal bradycardia  - Concern for uterine rupture   - History of CSx2 (CCS, LTCS)  - Short interval pregnancy  - No prenatal care  - Asthma      Postoperative diagnoses:  -  at 27w5d, now delivered  - Uterine rupture  - History of CSx2 (CCS, LTCS)  - Short interval pregnancy  - No prenatal care  - Asthma      Procedure performed:  Repeat  section via Pfannenstiel skin incision with single layer uterine closure    Anesthesia:  GETA  QBL (mL): 3142 mL (2.5-3 L hemoperitoneum on entry)  Fluid replacement: 1500 mL crystalloid.   Specimens: Placenta (not sent), cord gases   Complications: Uterine rupture, hemorrhage       Operative findings:   - Moderate rectofascial adhesions, omental adhesions to fascia superiorly.   - Herberth hemoperitoneum noted on entry to abdomen, estimated 2.5-3L blood loss  - Fetus and placenta in abdomen on entry, delivered breech en caul. AROM and immediate cord clamping occurred.   - Single, liveborn female infant at 0257 hours on 7/10/2022. Apgars of 1, 1, 5, 6, 6 at one, five, ten, fifteen, and twenty minutes. Birth weight pending. NICU team in OR at time of delivery, please see notes for resuscitation details.   - Arterial Cord pH 6.81 with base excess -26.0.    - Placenta intact with 3 vessel cord.     - Uterine rupture apparent along prior classical incision. Serosa intact over prior transverse incision, but myometrium  concerning for dehiscence. Normal appearing fallopian tubes and ovaries bilaterally.   - Bladder adherent to lower uterine segment, bladder flap easily created.   - Good uterine tone at end of case.     Transferred to PACU in stable condition.       Delia Weiner MD  OB/GYN Resident PGY-3  4:58 AM July 10, 2022

## 2022-07-10 NOTE — DISCHARGE SUMMARY
[facility] Discharge Summary    Robinson Myers MRN# [id number]   Age: 21 year old YOB: 2000     Date of Admission:  7/10/2022  Date of Discharge::  22  Admitting Physician:  Leola Oden MD  Discharge Physician:  Winter Thomas MD             Admission Diagnoses:   Intrauterine pregnancy at 27w5d  Fetal bradycardia  Concern for uterine rupture   History of CSx2 (CCS, LTCS)  Short interval pregnancy  H/o  deliveries   No prenatal care  Asthma    H/o PE , DVT            Discharge Diagnosis:     Same, delivered   Uterine rupture  History of CSx2 (CCS, LTCS)  Short interval pregnancy  H/o  deliveries  No prenatal care  Asthma    H/o PE , DVT   Acute blood loss anemia  Coagulopathy          Procedures:     Procedure(s): Repeat  section with single layer closure via Pfannenstiel skin incision  GETA     Blood transfusion           Medications Prior to Admission:     Medications Prior to Admission   Medication Sig Dispense Refill Last Dose     [DISCONTINUED] acetaminophen (TYLENOL) 325 MG tablet Take 2 tablets (650 mg) by mouth every 6 hours as needed for mild pain Start after Delivery. (Patient not taking: Reported on 7/10/2022) 100 tablet 0 Not Taking at Unknown time     [DISCONTINUED] acetaminophen (TYLENOL) 325 MG tablet Take 650 mg by mouth        [DISCONTINUED] albuterol (PROAIR HFA/PROVENTIL HFA/VENTOLIN HFA) 108 (90 Base) MCG/ACT inhaler Inhale 2 puffs into the lungs        [DISCONTINUED] fluticasone (FLOVENT HFA) 110 MCG/ACT inhaler Inhale 1 puff into the lungs 2 times daily        [DISCONTINUED] ibuprofen (ADVIL/MOTRIN) 600 MG tablet Take 1 tablet (600 mg) by mouth every 6 hours as needed for moderate pain Start after delivery (Patient not taking: Reported on 2/15/2022) 60 tablet 0      [DISCONTINUED] oxyCODONE (ROXICODONE) 5 MG tablet Take 1-2 tablets (5-10 mg) by mouth every 6 hours as needed for severe pain (Patient not taking:  Reported on 2/15/2022) 18 tablet 0      [DISCONTINUED] Prenatal Vit-Fe Fumarate-FA (PRENATAL PLUS) 27-1 MG TABS Take 1 tablet by mouth (Patient not taking: Reported on 2/15/2022)        [DISCONTINUED] senna-docusate (SENOKOT-S/PERICOLACE) 8.6-50 MG tablet Take 1 tablet by mouth daily Start after delivery. (Patient not taking: Reported on 2/15/2022) 100 tablet 0      [DISCONTINUED] sodium chloride (OCEAN) 0.65 % nasal spray Spray 1 spray in nostril (Patient not taking: Reported on 2/15/2022)                Discharge Medications:        Review of your medicines        START taking        Dose / Directions   enoxaparin ANTICOAGULANT 40 MG/0.4ML syringe  Commonly known as: LOVENOX  Used for: Personal history of DVT (deep vein thrombosis)      Dose: 40 mg  Inject 0.4 mLs (40 mg) Subcutaneous every 12 hours for 42 days  Quantity: 33.6 mL  Refills: 0     NIFEdipine ER 90 MG 24 hr tablet  Commonly known as: ADALAT CC  Used for: S/P  section      Dose: 90 mg  Start taking on: July 15, 2022  Take 1 tablet (90 mg) by mouth daily  Quantity: 30 tablet  Refills: 3     norethindrone 0.35 MG tablet  Commonly known as: MICRONOR  Used for: S/P  section      Dose: 0.35 mg  Take 1 tablet (0.35 mg) by mouth daily  Quantity: 90 tablet  Refills: 3            CONTINUE these medicines which may have CHANGED, or have new prescriptions. If we are uncertain of the size of tablets/capsules you have at home, strength may be listed as something that might have changed.        Dose / Directions   acetaminophen 325 MG tablet  Commonly known as: TYLENOL  This may have changed: Another medication with the same name was removed. Continue taking this medication, and follow the directions you see here.  Used for: S/P  section      Dose: 650 mg  Take 2 tablets (650 mg) by mouth every 6 hours as needed for mild pain Start after Delivery.  Quantity: 100 tablet  Refills: 0     oxyCODONE 5 MG tablet  Commonly known as:  ROXICODONE  This may have changed:     how much to take    when to take this    reasons to take this  Used for: S/P  section      Dose: 5 mg  Take 1 tablet (5 mg) by mouth every 4 hours as needed for moderate to severe pain  Quantity: 6 tablet  Refills: 0            CONTINUE these medicines which have NOT CHANGED        Dose / Directions   ibuprofen 600 MG tablet  Commonly known as: ADVIL/MOTRIN  Used for: S/P  section      Dose: 600 mg  Take 1 tablet (600 mg) by mouth every 6 hours as needed for moderate pain Start after delivery  Quantity: 60 tablet  Refills: 0     senna-docusate 8.6-50 MG tablet  Commonly known as: SENOKOT-S/PERICOLACE  Used for: S/P  section      Dose: 1 tablet  Take 1 tablet by mouth daily Start after delivery.  Quantity: 30 tablet  Refills: 0            STOP taking      albuterol 108 (90 Base) MCG/ACT inhaler  Commonly known as: PROAIR HFA/PROVENTIL HFA/VENTOLIN HFA        fluticasone 110 MCG/ACT inhaler  Commonly known as: FLOVENT HFA        Prenatal Plus 27-1 MG Tabs        sodium chloride 0.65 % nasal spray  Commonly known as: OCEAN                  Where to get your medicines        These medications were sent to [id number] - [place], [place] - [address] AT [address] [address]      Phone: [phone]     acetaminophen 325 MG tablet    enoxaparin ANTICOAGULANT 40 MG/0.4ML syringe    ibuprofen 600 MG tablet    NIFEdipine ER 90 MG 24 hr tablet    norethindrone 0.35 MG tablet    oxyCODONE 5 MG tablet    senna-docusate 8.6-50 MG tablet               Consultations:   NICU  Social Work           Brief Admission History:   Robinson Myers is a 21 year old  at 27w5d by LMP 22, here for evaluation of abdominal pain. Pregnancy is notable for h/o two prior CS (one classical and one LTCS), short interval pregnancy, h/o  deliveries, asthma, and no prenatal care in this  pregnancy. She reported that pain began several hours ago, occurring every 5 minutes or so and feels like contractions. Denied any vaginal bleeding, was feeling normal fetal movements. Upon arrival, FHR initially noted to be in 120s but then became difficult to trace. BSUS brought into the room for assistance with getting fetal heart rate on monitor at which time FHR was noted to be in 60-70s. STAT CS immediately called, patient verbally consented for CS and blood transfusion as needed.      Intraoperative course   The procedure was complicated by uterine rupture, hemoperitoneum, and hemorrhage.  EBL 3142 mL (2.5-3L hemoperitoneum at start of case). See operative report for details.     Operative findings:   - Moderate rectofascial adhesions, omental adhesions to fascia superiorly.   - Herberth hemoperitoneum noted on entry to abdomen, estimated 2.5-3L blood loss  - Fetus and placenta in abdomen on entry, delivered breech en caul. AROM and immediate cord clamping occurred.   - Single, liveborn female infant at 0257 hours on 7/10/2022. Apgars of 1, 1, 5, 6, 6 at one, five, ten, fifteen, and twenty minutes. Birth weight pending. NICU team in OR at time of delivery, please see notes for resuscitation details.   - Arterial Cord pH 6.81 with base excess -26.0.    - Placenta intact with 3 vessel cord.     - Uterine rupture apparent along prior classical incision. Serosa intact over prior transverse incision, but myometrium  concerning for dehiscence. Normal appearing fallopian tubes and ovaries bilaterally.   - Bladder adherent to lower uterine segment, bladder flap easily created.   - Good uterine tone at end of case.  - This patient should not labor in future pregnancies. Would recommend delivery at 36-37 wks at the latest. Due to significant rectofascial and intraabdominal adhesions, would recommend vertical midline incision in future deliveries.       Postpartum Course   The patient was stable out of the OR and  feeling well PPD#1. The patient received a total of 3 units of packed red blood cells with her hemoglobin hakeem of 6.3. Her blood pressure was difficult to control but was eventually sustained in the low mild to normotensive range with 90 mg of nifedipine ER daily. On PPD#2 the patient met criteria for preeclampsia with severe features by blood pressure criteria and was started on magnesium which was continued for 24 hours.  On discharge, her pain was well controlled. Vaginal bleeding is similar to peak menstrual flow.  Voiding without difficulty.  Ambulating well and tolerating a normal diet.  No fever or significant wound drainage.  Pumping well.  Infant in NICU.  Having bowel movements  She was discharged on post-partum day #4 for blood pressure control.    We discussed options for contraception. Patient will discharge with POPs and is considering IUD postpartum. Also discussed extensive follow up recommending 1 week, 2 week, and 6 week postpartum visits with home nursing and home BP monitoring.    Post-partum hemoglobin:   Hemoglobin   Date Value Ref Range Status   07/14/2022 7.8 (L) 11.7 - 15.7 g/dL Final   01/07/2019 12.3 11.7 - 15.7 g/dL Final             Discharge Instructions and Follow-Up:     Discharge diet: Regular   Discharge activity: No lifting greater than 20 lbs, pushing, pulling, or other strenuous activity for 6 weeks. Pelvic rest for 6 weeks including no sexual intercourse, tampons, or douching. No driving until you can slam on the brakes without pain or while on narcotic pain medications.    Discharge follow-up: Follow up with primary OB for routine postpartum visit in 1,2, and6 weeks  Home health nursing will visit you within 72 hours     Preconception counseling with MFM is highly recommended prior to any future pregnancies.   Wound care: Keep incision clean and dry           Discharge Disposition:     Discharged to home        Anthony Reynolds MD  OBGYN PGY-3  July 14, 2022 6:47  PM        Winter Thomas MD  9:05 PM

## 2022-07-10 NOTE — ANESTHESIA POSTPROCEDURE EVALUATION
Patient: Robinson Myers    Procedure: Procedure(s):   SECTION       Anesthesia Type:  No value filed.    Note:  Disposition: Inpatient   Postop Pain Control: Uneventful            Sign Out: Well controlled pain   PONV: No   Neuro/Psych: Uneventful            Sign Out: Acceptable/Baseline neuro status   Airway/Respiratory: Uneventful            Sign Out: Acceptable/Baseline resp. status   CV/Hemodynamics: Uneventful            Sign Out: Acceptable CV status   Other NRE: NONE   DID A NON-ROUTINE EVENT OCCUR? No           Last vitals:  Vitals:    07/10/22 0600 07/10/22 0615 07/10/22 0630   BP: (!) 157/89 (!) 167/89 (!) 157/90   Pulse: 71 70 73   Resp: 16 16    Temp: 36.4  C (97.5  F)  36.5  C (97.7  F)   SpO2: 100% 100% 100%       Electronically Signed By: Elliott Kendall MD  July 10, 2022  6:42 AM

## 2022-07-10 NOTE — ANESTHESIA PREPROCEDURE EVALUATION
Anesthesia Pre-Procedure Evaluation    Patient: Robinson Myers   MRN: [id number] : 2000        Procedure : Procedure(s):   SECTION          Past Medical History:   Diagnosis Date     Asthma      HDL deficiency      Obesity       Past Surgical History:   Procedure Laterality Date      SECTION N/A 10/13/2021    Procedure:  SECTION;  Surgeon: Cortney Bell MD;  Location: [facility] L+[facility]     NO HISTORY OF SURGERY        No Known Allergies   Social History     Tobacco Use     Smoking status: Former Smoker     Smokeless tobacco: Never Used   Substance Use Topics     Alcohol use: No      Wt Readings from Last 1 Encounters:   02/15/22 136 kg (299 lb 12.8 oz)        Anesthesia Evaluation   Pt has had prior anesthetic. Type: General.    No history of anesthetic complications       ROS/MED HX  ENT/Pulmonary:     (+) asthma     Neurologic:  - neg neurologic ROS     Cardiovascular:     (+) hypertension-----    METS/Exercise Tolerance:     Hematologic:  - neg hematologic  ROS     Musculoskeletal:  - neg musculoskeletal ROS     GI/Hepatic:  - neg GI/hepatic ROS     Renal/Genitourinary:  - neg Renal ROS     Endo:  - neg endo ROS   (+) Obesity,     Psychiatric/Substance Use:  - neg psychiatric ROS     Infectious Disease:       Malignancy:  - neg malignancy ROS     Other:      (+) , previous ,         Physical Exam    Airway  airway exam normal      Mallampati: III   TM distance: < 3 FB   Neck ROM: full   Mouth opening: > 3 cm    Respiratory Devices and Support         Dental  no notable dental history         Cardiovascular   cardiovascular exam normal          Pulmonary   pulmonary exam normal                OUTSIDE LABS:  CBC:   Lab Results   Component Value Date    WBC 25.8 (H) 07/10/2022    WBC 10.6 10/25/2021    HGB 8.3 (L) 07/10/2022    HGB 7.8 (L) 10/25/2021    HCT 25.4 (L) 07/10/2022    HCT 24.7 (L) 10/25/2021     07/10/2022     10/25/2021     BMP:   Lab Results    Component Value Date     10/25/2021     10/13/2021    POTASSIUM 4.2 10/25/2021    POTASSIUM 3.4 10/13/2021    CHLORIDE 112 (H) 10/25/2021    CHLORIDE 109 10/13/2021    CO2 27 10/25/2021    CO2 19 (L) 10/13/2021    BUN 8 10/25/2021    BUN 11 10/13/2021    CR 0.82 10/25/2021    CR 0.76 10/14/2021    GLC 84 10/25/2021     (H) 10/13/2021     COAGS:   Lab Results   Component Value Date    PTT 24 07/10/2022    INR 1.21 (H) 07/10/2022    FIBR 422 10/14/2021     POC: No results found for: BGM, HCG, HCGS  HEPATIC:   Lab Results   Component Value Date    ALBUMIN 2.8 (L) 10/25/2021    PROTTOTAL 7.6 10/25/2021    ALT 24 10/25/2021    AST 14 10/25/2021    ALKPHOS 83 10/25/2021    BILITOTAL 0.3 10/25/2021     OTHER:   Lab Results   Component Value Date    ALESSANDRA 8.6 10/25/2021    LIPASE 58 (L) 10/25/2021       Anesthesia Plan    ASA Status:  3, emergent    NPO Status:  ELEVATED Aspiration Risk/Unknown    Anesthesia Type: General.     - Airway: ETT   Induction: RSI.   Maintenance: Balanced.   Techniques and Equipment:     - Lines/Monitors: 2nd IV     - Blood: T&S     Drips/Meds: Oxytocin.     Consents    Anesthesia Plan(s) and associated risks, benefits, and realistic alternatives discussed. Questions answered and patient/representative(s) expressed understanding.    - Discussed:     - Discussed with:  Patient      - Extended Intubation/Ventilatory Support Discussed: No.      - Patient is DNR/DNI Status: No         Postoperative Care    Pain management: IV analgesics, Multi-modal analgesia, Oral pain medications.   PONV prophylaxis: Ondansetron (or other 5HT-3)     Comments:           neg OB ROS.       Luca Vivar MD

## 2022-07-10 NOTE — PROGRESS NOTES
Patient arrived to [facility] unit via zoom cart at 0940,with belongings, accompanied by family. Received report from Malinda. Unit and room orientation completd. Call light given; no concerns present at this time. Continue with plan of care.

## 2022-07-10 NOTE — PROVIDER NOTIFICATION
07/10/22 1403   Provider Notification   Provider Name/Title G3   Method of Notification Electronic Page   Request Evaluate-Remote   Notification Reason Lab Results   FYI hemoglobin drawn early was 7.0. blood running now

## 2022-07-10 NOTE — PROVIDER NOTIFICATION
07/10/22 0845   Provider Notification   Provider Name/Title Dr Healy   Method of Notification Phone   Notification Reason Status Update   Pt having TAP block now. Most recent /89, awaiting HELLP labs to be drawn. Pt refusing urine tox screen, will update NICU team. Plan is for 30 minutes of monitoring s/p TAP block and then transfer to [facility]. Pt has already been to NICU for a visit with baby.

## 2022-07-10 NOTE — OP NOTE
[facility]   OPERATIVE NOTE:  SECTION    Surgery Date:  July 10, 2022   Surgeon(s): Leola Oden MD  Assistants:  Delia Weiner MD, PGY-3     Preoperative Diagnoses:  -  at 27w5d   - Fetal bradycardia  - Concern for concealed abruption and/or uterine rupture   - History of CSx2 (CCS, LTCS)  - Short interval pregnancy  - No prenatal care  - Asthma    - H/o PE 2019, DVT 2016      Postoperative diagnoses:  -  at 27w5d, now delivered  - Uterine rupture     Procedure performed:  Repeat classical  section via Pfannenstiel skin incision with single layer uterine closure     Anesthesia:  GETA  QBL (mL): 3142 mL (2.5-3 L hemoperitoneum on entry)  Fluid replacement: 1500 mL crystalloid   Specimens: cord gases   Complications: Uterine rupture, intrapartum hemorrhage        Operative findings:   - Moderate rectofascial adhesions, omental adhesions to fascia superiorly.   - Herberth hemoperitoneum noted on entry to abdomen, estimated 2.5-3L  - Fetus and majority of placenta extrauterine on entry into abdominal cavity,  delivered breech en caul. AROM and immediate cord clamping occurred.   - Single, liveborn female infant at 0257 hours on 7/10/2022. Apgars of 1, 1, 5, 6, 6 at one, five, ten, fifteen, and twenty minutes. Birth weight pending. NICU team in OR at time of delivery, please see notes for resuscitation details.   - Arterial Cord pH 6.81 with base excess -26.0.    - Placenta intact with 3 vessel cord.     - Uterine rupture apparent along entire length of prior classical incision. Serosa intact over prior transverse incision, but myometrium  concerning for dehiscence in this area as well. Normal appearing salpinges and ovaries bilaterally.   - Bladder adherent to lower uterine segment, bladder flap easily created.   - Good uterine tone at end of case.  - This patient should not labor in future pregnancies. Would recommend delivery at 36 wks at  the latest. Due to significant rectofascial and intraabdominal adhesions, would recommend vertical midline incision in future deliveries.    Indication:   Robinson Myers is a 21 year old  at 27w5d by LMP 22, who presented via EMS for evaluation of abdominal pain. Pregnancy is notable for h/o two prior CS (one classical and one LTCS), short interval pregnancy, h/o  birth, asthma, and no prenatal care in this pregnancy. She reported that pain began several hours ago, occurring every 5 minutes or so and feels like contractions. Denied any vaginal bleeding, was feeling normal fetal movements. Upon arrival, FHR initially noted to be in 120s but then became difficult to trace. BSUS brought into the room for assistance with getting fetal heart rate on monitor at which time FHR was noted to be in 60-70s. STAT CS immediately called, patient verbally consented for CS and blood transfusion as needed. Additionally, patient had pain well out of proportion with pressure from the ultrasound probe.    Procedure details:  After obtaining informed verbal consent, the patient was taken to the operating room. She received Ancef and azithromycin prior to the skin incision. She was placed in the dorsal supine position with a leftward tilt, prepped with betadine, and draped in the usual sterile fashion.     She underwent general anesthesia induction and intubation. After intubation the abdomen was entered through a transverse skin incision just superior to her prior low transverse incision. The skin incision was made sharply and carried through the subcutaneous tissue to the fascia. Fascia was incised in the midline and extended laterally bluntly. The muscle was  in the midline and the peritoneum was entered bluntly and extended with digital pressure. Herberth hemoperitoneum was noted on entry in to the abdomen. Placental portions noted anteriorly, the uterine fundus was palpated and noted to be firm with  rupture. The fetus was then identified in the abdomen, the breech was grasped and the fetus was delivered en caul. AROM was performed and the cord was immediately doubly clamped and cut. The infant was brought to the warmer where the NICU team was waiting.     The placenta was removed (mostly from the abdominal cavity) and hemoperitoneum evacuated to allow for evaluation of the uterus. The lower uterine segment was noted to have serosal adhesions to the bladder which were taken down using cautery and bluntly. The uterus was then able to be exteriorized and was cleared of clot and debris. Rupture was noted along prior classical incision and there was also noted to be a partial dehiscence along the prior transverse incision with overlying serosa intact. Uterine tone was noted to be adequate with IV pitocin and minimal bleeding from the hysterotomy. The transverse dehiscence was reinforced with running locked 0-vicryl suture. The rupture along the prior classical scar was then closed with running locked 0-vicryl suture. There was a portion of the vertical incision with ongoing bleeding that was reinforced with a second layer of running locked 0-vicryl suture with good hemostasis noted.     The posterior cul-de-sac was cleared of clot and debris and the uterus was returned to the abdomen. Omental bands were taken down with serial jean carlos clamps and ties of 0 vicryl. The bilateral pericolic gutters were then able to be appropriately visualized and cleared of clot and debris. There were significant large, well organized clot in the upper abdomen. Patient placed in reverse Trendelenberg to allow for evacuation of clots. No evidence of ongoing bleeding or pooling after evacuation of clots.      The hysterotomy was again inspected and found to be hemostatic. The bladder flap was inspected and found to be hemostatic, bladder dome also noted to be intact. Seprafilm was placed along the hysterotomy. The abdominal wall was  examined and also found to be hemostatic. The fascia was closed with two running suture of 0-looped PDS starting at both angles and meeting in the midline.  Subcutaneous tissue was irrigated. Areas that were not hemostatic were controlled with cautery. The subcutaneous tissue was greater than 3cm in depth and was reapproximated with running 3-0 plain catgut. The skin was closed with 4-0 vicryl on a Felix needle. The patient tolerated the procedure well and was taken to the recovery room in stable condition. All sponge, needle and instrument counts were correct x2. Dr. Oden was present for the entire procedure.     Delia Weiner MD  OB/GYN Resident PGY-3  5:39 AM July 10, 2022       I participated in all aspects of Robinson Myers's case with Dr. Weiner on 7/10/2022 and agree with the operative details in this note with edits by me.     Leola Oden MD MPH

## 2022-07-10 NOTE — ANESTHESIA PROCEDURE NOTES
Airway       Patient location during procedure: OR       Procedure Start/Stop Times: 7/10/2022 2:56 AM  Staff -        Anesthesiologist:  Elliott Kendall MD       Resident/Fellow: Luca Vivar MD       Performed By: resident  Consent for Airway        Urgency: elective  Indications and Patient Condition       Indications for airway management: jess-procedural       Induction type:RSI       Mask difficulty assessment: 0 - not attempted    Final Airway Details       Final airway type: endotracheal airway       Successful airway: ETT - single  Endotracheal Airway Details        ETT size (mm): 6.5       Cuffed: yes       Successful intubation technique: video laryngoscopy       VL Blade Size: Glidescope 3       Grade View of Cords: 2       Adjucts: stylet       Position: Right       Measured from: lips       Secured at (cm): 25       Bite block used: Soft    Post intubation assessment        Placement verified by: capnometry, equal breath sounds and chest rise        Number of attempts at approach: 2       Number of other approaches attempted: 0       Secured with: pink tape       Ease of procedure: easy       Dentition: Intact    Medication(s) Administered   Medication Administration Time: 7/10/2022 2:56 AM

## 2022-07-10 NOTE — PLAN OF CARE
Pt admitted to unit for a  labor eval at 0202. RN received report from EMS personal, who stated pt called at 0145 stating she has been having contractions q4-10 minutes since 0100. Pt denies leaking of fluid, bleeding, pressure/urge to push. Pt states she had limited prenatal care at [facility], with her last ultrasound at 12 weeks gestation, no prenatal records seen in [facility] or [facility]. Gestation unknown, pt reported 26 weeks. Pt has a hx significant history of  births, c/section x2, uterine rupture, precipitous delivery in ED, asthma, and PE. VSS, EFM applied from [id number], baseline 120s. Provider paged by charge nurse at 0220 that pt has arrived. Provider in department at 0230, RN and provider reviewed pt hx. Nurse at bedside at 0233, fetal monitor adjusted. Provider at bedside at 0241. After repeated difficulty of tracing the fetal heart rate, at 0243 RN requested the provider ultrasound. Upon ultrasound, provider stated that the baby's heart tones were in the 70s and provider called a stat  at 0246. Pt was immediately brought to OR. Report given to Maria T JONES and Xochitl JONES at 0247.

## 2022-07-10 NOTE — PROVIDER NOTIFICATION
07/10/22 1037   Provider Notification   Provider Name/Title Niraj G3   Method of Notification Electronic Page   Request Evaluate-Remote   Notification Reason Lab Results   Latest hemoglobin 7.6, latest /86. Any new orders?

## 2022-07-10 NOTE — PLAN OF CARE
Cared for patient in PACU from 0330-7159. Pt able to visit NICU for 20 minutes while awaiting availability of anesthesia team for TAP block. Pt signed TAP consent form at 0825 and procedure started. Prior to TAP block pain rated 7/10, after TAP block 6/10 with pt stating she had a tolerable pain level. Elevated BP without sustained severe range, denies pre-e symptoms. Transferred up to [facility] at 0930 and pre-eclampsia labs + repeat CBC drawn upon arrival. Vitals stable. Lochia minimal. Bedside report given to ROBERT Sanderson at 0950.

## 2022-07-11 LAB
ERYTHROCYTE [DISTWIDTH] IN BLOOD BY AUTOMATED COUNT: 14.2 % (ref 10–15)
HBV SURFACE AG SERPL QL IA: NONREACTIVE
HCT VFR BLD AUTO: 24.1 % (ref 35–47)
HGB BLD-MCNC: 7.3 G/DL (ref 11.7–15.7)
HGB BLD-MCNC: 8.2 G/DL (ref 11.7–15.7)
MCH RBC QN AUTO: 27.9 PG (ref 26.5–33)
MCHC RBC AUTO-ENTMCNC: 34 G/DL (ref 31.5–36.5)
MCV RBC AUTO: 82 FL (ref 78–100)
PLATELET # BLD AUTO: 147 10E3/UL (ref 150–450)
PLATELET # BLD AUTO: 235 10E3/UL (ref 150–450)
RBC # BLD AUTO: 2.94 10E6/UL (ref 3.8–5.2)
RUBV IGG SERPL QL IA: 1.19 INDEX
RUBV IGG SERPL QL IA: POSITIVE
WBC # BLD AUTO: 18.4 10E3/UL (ref 4–11)

## 2022-07-11 PROCEDURE — 250N000013 HC RX MED GY IP 250 OP 250 PS 637: Performed by: OBSTETRICS & GYNECOLOGY

## 2022-07-11 PROCEDURE — 120N000002 HC R&B MED SURG/OB UMMC

## 2022-07-11 PROCEDURE — 36415 COLL VENOUS BLD VENIPUNCTURE: CPT | Performed by: STUDENT IN AN ORGANIZED HEALTH CARE EDUCATION/TRAINING PROGRAM

## 2022-07-11 PROCEDURE — 85014 HEMATOCRIT: CPT | Performed by: STUDENT IN AN ORGANIZED HEALTH CARE EDUCATION/TRAINING PROGRAM

## 2022-07-11 PROCEDURE — 85049 AUTOMATED PLATELET COUNT: CPT | Performed by: OBSTETRICS & GYNECOLOGY

## 2022-07-11 PROCEDURE — 250N000013 HC RX MED GY IP 250 OP 250 PS 637

## 2022-07-11 PROCEDURE — 85018 HEMOGLOBIN: CPT | Performed by: STUDENT IN AN ORGANIZED HEALTH CARE EDUCATION/TRAINING PROGRAM

## 2022-07-11 PROCEDURE — 250N000013 HC RX MED GY IP 250 OP 250 PS 637: Performed by: STUDENT IN AN ORGANIZED HEALTH CARE EDUCATION/TRAINING PROGRAM

## 2022-07-11 RX ORDER — PRENATAL VIT/IRON FUM/FOLIC AC 27MG-0.8MG
1 TABLET ORAL DAILY
Status: DISCONTINUED | OUTPATIENT
Start: 2022-07-11 | End: 2022-07-14 | Stop reason: HOSPADM

## 2022-07-11 RX ORDER — ALBUTEROL SULFATE 90 UG/1
2 AEROSOL, METERED RESPIRATORY (INHALATION) EVERY 4 HOURS PRN
Status: DISCONTINUED | OUTPATIENT
Start: 2022-07-11 | End: 2022-07-14 | Stop reason: HOSPADM

## 2022-07-11 RX ORDER — AMOXICILLIN 250 MG
1 CAPSULE ORAL DAILY
Status: DISCONTINUED | OUTPATIENT
Start: 2022-07-11 | End: 2022-07-11

## 2022-07-11 RX ORDER — FLUTICASONE PROPIONATE 110 UG/1
1 AEROSOL, METERED RESPIRATORY (INHALATION) 2 TIMES DAILY
Status: DISCONTINUED | OUTPATIENT
Start: 2022-07-11 | End: 2022-07-14 | Stop reason: HOSPADM

## 2022-07-11 RX ORDER — ENOXAPARIN SODIUM 100 MG/ML
40 INJECTION SUBCUTANEOUS EVERY 24 HOURS
Status: DISCONTINUED | OUTPATIENT
Start: 2022-07-11 | End: 2022-07-13

## 2022-07-11 RX ADMIN — ACETAMINOPHEN 975 MG: 325 TABLET, FILM COATED ORAL at 01:32

## 2022-07-11 RX ADMIN — ACETAMINOPHEN 975 MG: 325 TABLET, FILM COATED ORAL at 21:48

## 2022-07-11 RX ADMIN — IBUPROFEN 800 MG: 800 TABLET, FILM COATED ORAL at 01:32

## 2022-07-11 RX ADMIN — PRENATAL VITAMINS-IRON FUMARATE 27 MG IRON-FOLIC ACID 0.8 MG TABLET 1 TABLET: at 08:46

## 2022-07-11 RX ADMIN — ACETAMINOPHEN 975 MG: 325 TABLET, FILM COATED ORAL at 08:12

## 2022-07-11 RX ADMIN — NIFEDIPINE 30 MG: 30 TABLET, FILM COATED, EXTENDED RELEASE ORAL at 08:12

## 2022-07-11 RX ADMIN — OXYCODONE HYDROCHLORIDE 5 MG: 5 TABLET ORAL at 13:51

## 2022-07-11 RX ADMIN — IBUPROFEN 800 MG: 800 TABLET, FILM COATED ORAL at 08:12

## 2022-07-11 RX ADMIN — ACETAMINOPHEN 975 MG: 325 TABLET, FILM COATED ORAL at 13:50

## 2022-07-11 RX ADMIN — SENNOSIDES AND DOCUSATE SODIUM 2 TABLET: 50; 8.6 TABLET ORAL at 08:12

## 2022-07-11 RX ADMIN — FLUTICASONE PROPIONATE 1 PUFF: 110 AEROSOL, METERED RESPIRATORY (INHALATION) at 08:46

## 2022-07-11 RX ADMIN — IBUPROFEN 800 MG: 800 TABLET, FILM COATED ORAL at 21:48

## 2022-07-11 RX ADMIN — SIMETHICONE 80 MG: 80 TABLET, CHEWABLE ORAL at 08:11

## 2022-07-11 RX ADMIN — OXYCODONE HYDROCHLORIDE 5 MG: 5 TABLET ORAL at 21:48

## 2022-07-11 RX ADMIN — IBUPROFEN 800 MG: 800 TABLET, FILM COATED ORAL at 13:50

## 2022-07-11 ASSESSMENT — ACTIVITIES OF DAILY LIVING (ADL)
ADLS_ACUITY_SCORE: 21
ADLS_ACUITY_SCORE: 18
ADLS_ACUITY_SCORE: 21

## 2022-07-11 NOTE — LACTATION NOTE
"This note was copied from a baby's chart.  D:  I met with Robinson. She states she has asthma and uses Flovent as needed; has no history of breast/chest surgery or trauma.  Her medical record indicates a history of chronic hypertension, short interval pregnancy, no prenatal care, h/o PE, DVT, uterine rupture, postpartum sepsis, conduct disorder. Baby was born via stat c/s for abruption, fetal bradycardia. Robinson received blood transfusions x2; estimated blood loss not known by this writer. Baby's urine tox screens are negative; maternal urine tox not done. Cord tissue pending. Social Work assessment pending. This baby is her 5th child; she had demise of first baby in 2018 at 21 weeks. Her living children, born in 2019 (24+4), 2020(33+3), 2021(34+1) were  for a range of 3-6 months with supplementing.  She has already started to pump, getting up to 5ml.    I:  I gave her a folder of introductory materials, reviewed physiology of colostrum and milk production, pumping guidelines, and I gave her a log and encouraged her to use it.  I explained how to access the videos \"Hand Expression\" and \"Maximizing Milk Production\"; as well as other helpful books and websites.  We discussed hands-on pumping techniques and usefulness of a hands-free pumping bra.  We discussed skin to skin holding and how to reach breastfeeding goals.  We talked about medications during breastfeeding.  She verbalized understanding. She has pump coverage through her insurance company.  I helped transport her to visit baby in the NICU after our conversation.  A:  Mom has information she needs to initiate her supply.   P:  Will continue to provide lactation support.  Cristal Fuller, RNC, IBCLC             "

## 2022-07-11 NOTE — PROVIDER NOTIFICATION
07/11/22 0701   Provider Notification   Provider Name/Title G3   Method of Notification Electronic Page   Request Evaluate-Remote   Notification Reason Status Update;Lab Results  (HGB after 2nd unit of pRBC is 7.3, no symptoms)

## 2022-07-11 NOTE — PLAN OF CARE
Improved pt status after 2 units of pRBC, steady on feet, denies dizziness. VSS on RA. Fundus firm at 2 cm below umbilicus. Lochia scant. Voids spontaneously. Adequate I&O. Passing flatus, denies nausea. Incisional pain managed with Oxycodone 5 mg,Tylenol and Ibuprofen. Cold pack applied on incision site. Incision dressing CDI. Infant in NICU. Breast pumping encouraged. Continue plan of care.     Vitals:    07/10/22 2340 07/10/22 2355 07/11/22 0109 07/11/22 0535   BP: 126/73 126/76 137/76    BP Location:       Patient Position:       Cuff Size:       Pulse: 82 72 68    Resp: 17 17 18    Temp: 97.8  F (36.6  C) 98.3  F (36.8  C) 97.8  F (36.6  C)    TempSrc:  Oral Oral    SpO2:  100% 100%    Weight:    146.1 kg (322 lb 1.6 oz)    Add: hemoglobin post 2nd unit of pRBC is 7.3, but no symptoms noted this time.

## 2022-07-11 NOTE — PROVIDER NOTIFICATION
07/11/22 0140   Provider Notification   Provider Name/Title G3   Method of Notification Electronic Page   Request Evaluate-Remote   Notification Reason Status Update  (2nd unit of pRBC transfusion done, do you need a recheck after 6 hrs?)

## 2022-07-11 NOTE — PROVIDER NOTIFICATION
07/10/22 2059   Provider Notification   Provider Name/Title G3   Method of Notification Electronic Page   Request Evaluate-Remote   Notification Reason Lab Results;Status Update  (Hemoglobin post BT is 7.3, some slight dizziness after using the bathroom)   G3 Niraj ordered another 1 unit of blood, pt agreed transfusion.
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
## 2022-07-11 NOTE — PLAN OF CARE
Data: Vital signs within normal limits except BP in mild range- providers aware.  Postpartum checks within normal limits - see flow record. Patient eating and drinking normally. Oseguera removed at 1700, pt encouraged to keep drinking fluids and need to attempt void by 2100. No apparent signs of infection. Incision covered, dressing clean, dry and intact. Patient performing self cares and wants to pump for infant but has not yet.   Action: Patient medicated during the shift for pain. See MAR. Patient reassessed within 1 hour after each medication and pain was improved - patient stated she was comfortable. Patient education done. See flow record.  Response: patient speaks positively about infant. Support persons sister present.   Plan: continue with plan of care

## 2022-07-11 NOTE — PROGRESS NOTES
Anesthesia Postpartum Follow-Up Note after  Section    Patient: Robinson Myers    Patient location: Postpartum floor    Chief complaint: Acute postoperative pain management s/p general anesthetic.    Procedure(s) Performed:  Procedure(s):   SECTION    Anesthesia type: General    Subjective  Resting comfortably at this time. Pain adequately controlled per OB team. Denies pruritis, weakness, paresthesias, difficulties breathing or voiding, headache, nausea, or vomiting. She is able to ambulate and tolerates a regular diet.     Objective  Respiratory Function (RR / SpO2 / Airway Patency): Satisfactory  Cardiac Function (HR / Rhythm / BP): Satisfactory  Strength and sensation lower extremities: Normal  Site of spinal/epidural insertion: N/A    Most recent vitals  /76   Pulse 68   Temp 36.6  C (97.8  F) (Oral)   Resp 18   Wt 146.1 kg (322 lb 1.6 oz)   LMP 2021   SpO2 100%   Breastfeeding Unknown   BMI 51.99 kg/m      Assessment and plan  Robinson Myers is a 21 year old female  POD 1 s/p  Section.. She received intraop IV hydromorphone (1.5 mg), IV fentanyl in PACU, and single shot bilateral TAP nerve block injections with bupivacaine 0.25% and long-acting liposomal bupivacaine (Exparel) 1.3% bilaterally.     She is ambulating without difficulty and without weakness or paresthesias. There is no evidence of adverse side effects associated with the fascial plane block injections. The patient is receiving adequate incisional pain control at this time and anticipate up to 72 hours of incisional pain control. However, we further anticipate that the patient may require opioid and non-opioid analgesics for visceral and muscle pain that is not controlled with local anesthetic.      In brief summary, her postoperative analgesia is adequately controlled today.   Thank you for including us in the care for this patient. Anesthesia will be signing off at this time. If there  are any concerns please contact the department of anesthesia OB division ([phone]    Luca Vivar MD  Anesthesiology Resident, CA-2

## 2022-07-11 NOTE — PROGRESS NOTES
"Post Partum Progress Note  PPD#1    Subjective:  She states that she is resting comfortably in bed this morning. She has not complaints. Pain is improving and well controlled on current medication regimen. She is tolerating PO intake. Lochia present and minimal.  She is voiding without difficulty. She has passed flatus and has not had a BM. She is ambulating without dizziness or difficulty.  She denies headache, changes in vision, nausea/vomiting, chest pain, shortness of breath, RUQ pain, or worsening edema.  Plans to pump and breast feed.    Objective:  Vital signs:  Temp: 98  F (36.7  C) Temp src: Oral BP: 129/81 Pulse: 78   Resp: 16 SpO2: 99 % O2 Device: None (Room air) Oxygen Delivery: 2 LPM      Estimated body mass index is 48.39 kg/m  as calculated from the following:    Height as of 2/15/22: 1.676 m (5' 6\").    Weight as of 2/15/22: 136 kg (299 lb 12.8 oz).    General: NAD, resting comfortably  CV: Regular rate, well perfused.   Pulm: Normal respiratory effort.  Abd: Soft, non-tender, non-distended. Fundus is firm and 4 cm below the umbilicus.    Incision: incision is clean, dry, intact  Ext: trace lower extremity edema bilaterally. No calf tenderness.    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 Doing well postpartum    # Asthma  - Continue PTA albuterol    # Chronic hypertension  - HELLP labs last wnl (7/10)  - Blood pressures normotensive to low mild overnight   - D#2 nifedipine ER 30 mg daily    # History of DVT  - Anticipate starting lovenox this morning  - Plan to continue for 6 weeks postpartum     # Postpartum:  - Encourage routine post-operative goals including ambulation and incentive spirometry  - PNC: Rh positive. Rubella unknown but titer pending. No intervention indicated.  - Pain: controlled on oral medications  - Heme: EBL 3142> 8.3 (introp)> 7.7> 7.6> 1u pRBC> 7.0.  If <10 will discharge home with iron.   - GI: " continue anti-emetics and stool softeners as needed.  - : S/p putnam, voiding spontaneously  - Infant: In NICU  - Feeding: Plans on pump and breast feed.  - BC: Undecided    Anticipate discharge to home on POD#2-3    Anthony Reynolds MD  OBGYN PGY-3  July 11, 2022 6:58 AM

## 2022-07-11 NOTE — PLAN OF CARE
"Goal Outcome Evaluation:  3229-1958:  VSS, except for elevated BP (though not severe range) and postpartum assessments WDL.  Pt resting much of this morning.  Pt reports feeling significantly better after blood transfusion overnight.  Up ad gabbi with steady gait and independent with cares, showered today.  Pumping for infant in NICU and getting small amounts of colostrum.  Went to see infant this afternoon, pt very tearful when back to her room stating \"she is not doing well\" and \"I wasn't expecting this, my 24 weeker did well so I thought she would be better because she's 27 weeks\".  This writer provided emotional therapeutic listening and aromatherpy.  Pain managed with tylenol, ibuprofen, simethicone and oxycodone per MAR.  Pt's sister here this afternoon and supportive.  Will continue with postpartum cares and education per plan of care.                     "

## 2022-07-12 LAB
ALT SERPL W P-5'-P-CCNC: 21 U/L (ref 0–50)
ALT SERPL W P-5'-P-CCNC: 25 U/L (ref 0–50)
AST SERPL W P-5'-P-CCNC: 15 U/L (ref 0–45)
AST SERPL W P-5'-P-CCNC: 22 U/L (ref 0–45)
BLD PROD TYP BPU: NORMAL
BLOOD COMPONENT TYPE: NORMAL
CODING SYSTEM: NORMAL
CREAT SERPL-MCNC: 0.6 MG/DL (ref 0.52–1.04)
CREAT SERPL-MCNC: 0.61 MG/DL (ref 0.52–1.04)
CROSSMATCH: NORMAL
ERYTHROCYTE [DISTWIDTH] IN BLOOD BY AUTOMATED COUNT: 14.3 % (ref 10–15)
ERYTHROCYTE [DISTWIDTH] IN BLOOD BY AUTOMATED COUNT: 14.5 % (ref 10–15)
GFR SERPL CREATININE-BSD FRML MDRD: >90 ML/MIN/1.73M2
GFR SERPL CREATININE-BSD FRML MDRD: >90 ML/MIN/1.73M2
HCT VFR BLD AUTO: 18.1 % (ref 35–47)
HCT VFR BLD AUTO: 23.7 % (ref 35–47)
HGB BLD-MCNC: 6.3 G/DL (ref 11.7–15.7)
HGB BLD-MCNC: 8 G/DL (ref 11.7–15.7)
ISSUE DATE AND TIME: NORMAL
MCH RBC QN AUTO: 27.9 PG (ref 26.5–33)
MCH RBC QN AUTO: 28.5 PG (ref 26.5–33)
MCHC RBC AUTO-ENTMCNC: 33.8 G/DL (ref 31.5–36.5)
MCHC RBC AUTO-ENTMCNC: 34.8 G/DL (ref 31.5–36.5)
MCV RBC AUTO: 80 FL (ref 78–100)
MCV RBC AUTO: 84 FL (ref 78–100)
PLATELET # BLD AUTO: 154 10E3/UL (ref 150–450)
PLATELET # BLD AUTO: 204 10E3/UL (ref 150–450)
RBC # BLD AUTO: 2.26 10E6/UL (ref 3.8–5.2)
RBC # BLD AUTO: 2.81 10E6/UL (ref 3.8–5.2)
UNIT ABO/RH: NORMAL
UNIT NUMBER: NORMAL
UNIT STATUS: NORMAL
UNIT TYPE ISBT: 7300
WBC # BLD AUTO: 11.4 10E3/UL (ref 4–11)
WBC # BLD AUTO: 12.9 10E3/UL (ref 4–11)

## 2022-07-12 PROCEDURE — 250N000013 HC RX MED GY IP 250 OP 250 PS 637: Performed by: OBSTETRICS & GYNECOLOGY

## 2022-07-12 PROCEDURE — 120N000002 HC R&B MED SURG/OB UMMC

## 2022-07-12 PROCEDURE — 36415 COLL VENOUS BLD VENIPUNCTURE: CPT | Performed by: OBSTETRICS & GYNECOLOGY

## 2022-07-12 PROCEDURE — 250N000013 HC RX MED GY IP 250 OP 250 PS 637

## 2022-07-12 PROCEDURE — 250N000013 HC RX MED GY IP 250 OP 250 PS 637: Performed by: STUDENT IN AN ORGANIZED HEALTH CARE EDUCATION/TRAINING PROGRAM

## 2022-07-12 PROCEDURE — 82565 ASSAY OF CREATININE: CPT | Performed by: STUDENT IN AN ORGANIZED HEALTH CARE EDUCATION/TRAINING PROGRAM

## 2022-07-12 PROCEDURE — 84460 ALANINE AMINO (ALT) (SGPT): CPT | Performed by: OBSTETRICS & GYNECOLOGY

## 2022-07-12 PROCEDURE — 258N000003 HC RX IP 258 OP 636: Performed by: STUDENT IN AN ORGANIZED HEALTH CARE EDUCATION/TRAINING PROGRAM

## 2022-07-12 PROCEDURE — 36415 COLL VENOUS BLD VENIPUNCTURE: CPT | Performed by: STUDENT IN AN ORGANIZED HEALTH CARE EDUCATION/TRAINING PROGRAM

## 2022-07-12 PROCEDURE — P9016 RBC LEUKOCYTES REDUCED: HCPCS

## 2022-07-12 PROCEDURE — 250N000011 HC RX IP 250 OP 636: Performed by: STUDENT IN AN ORGANIZED HEALTH CARE EDUCATION/TRAINING PROGRAM

## 2022-07-12 PROCEDURE — 82565 ASSAY OF CREATININE: CPT | Performed by: OBSTETRICS & GYNECOLOGY

## 2022-07-12 PROCEDURE — 84460 ALANINE AMINO (ALT) (SGPT): CPT | Performed by: STUDENT IN AN ORGANIZED HEALTH CARE EDUCATION/TRAINING PROGRAM

## 2022-07-12 PROCEDURE — 84450 TRANSFERASE (AST) (SGOT): CPT | Performed by: STUDENT IN AN ORGANIZED HEALTH CARE EDUCATION/TRAINING PROGRAM

## 2022-07-12 PROCEDURE — 84450 TRANSFERASE (AST) (SGOT): CPT | Performed by: OBSTETRICS & GYNECOLOGY

## 2022-07-12 PROCEDURE — 250N000011 HC RX IP 250 OP 636: Performed by: OBSTETRICS & GYNECOLOGY

## 2022-07-12 PROCEDURE — 85027 COMPLETE CBC AUTOMATED: CPT | Performed by: OBSTETRICS & GYNECOLOGY

## 2022-07-12 PROCEDURE — 85027 COMPLETE CBC AUTOMATED: CPT | Performed by: STUDENT IN AN ORGANIZED HEALTH CARE EDUCATION/TRAINING PROGRAM

## 2022-07-12 RX ORDER — CALCIUM GLUCONATE 94 MG/ML
1 INJECTION, SOLUTION INTRAVENOUS
Status: DISCONTINUED | OUTPATIENT
Start: 2022-07-12 | End: 2022-07-14 | Stop reason: HOSPADM

## 2022-07-12 RX ORDER — HYDRALAZINE HYDROCHLORIDE 20 MG/ML
10 INJECTION INTRAMUSCULAR; INTRAVENOUS
Status: DISCONTINUED | OUTPATIENT
Start: 2022-07-12 | End: 2022-07-14 | Stop reason: HOSPADM

## 2022-07-12 RX ORDER — LORAZEPAM 2 MG/ML
2 INJECTION INTRAMUSCULAR
Status: CANCELLED | OUTPATIENT
Start: 2022-07-12

## 2022-07-12 RX ORDER — MAGNESIUM SULFATE IN WATER 40 MG/ML
2 INJECTION, SOLUTION INTRAVENOUS CONTINUOUS
Status: DISCONTINUED | OUTPATIENT
Start: 2022-07-12 | End: 2022-07-13

## 2022-07-12 RX ORDER — LABETALOL HYDROCHLORIDE 5 MG/ML
20-80 INJECTION, SOLUTION INTRAVENOUS EVERY 10 MIN PRN
Status: DISCONTINUED | OUTPATIENT
Start: 2022-07-12 | End: 2022-07-14 | Stop reason: HOSPADM

## 2022-07-12 RX ORDER — MAGNESIUM SULFATE HEPTAHYDRATE 40 MG/ML
4 INJECTION, SOLUTION INTRAVENOUS ONCE
Status: CANCELLED | OUTPATIENT
Start: 2022-07-12 | End: 2022-07-12

## 2022-07-12 RX ORDER — LORAZEPAM 2 MG/ML
2 INJECTION INTRAMUSCULAR
Status: DISCONTINUED | OUTPATIENT
Start: 2022-07-12 | End: 2022-07-14 | Stop reason: HOSPADM

## 2022-07-12 RX ORDER — MAGNESIUM SULFATE HEPTAHYDRATE 40 MG/ML
2 INJECTION, SOLUTION INTRAVENOUS
Status: CANCELLED | OUTPATIENT
Start: 2022-07-12

## 2022-07-12 RX ORDER — MAGNESIUM SULFATE HEPTAHYDRATE 40 MG/ML
4 INJECTION, SOLUTION INTRAVENOUS ONCE
Status: COMPLETED | OUTPATIENT
Start: 2022-07-12 | End: 2022-07-12

## 2022-07-12 RX ORDER — DIPHENHYDRAMINE HCL 25 MG
50 CAPSULE ORAL ONCE
Status: COMPLETED | OUTPATIENT
Start: 2022-07-12 | End: 2022-07-12

## 2022-07-12 RX ORDER — MAGNESIUM SULFATE HEPTAHYDRATE 500 MG/ML
10 INJECTION, SOLUTION INTRAMUSCULAR; INTRAVENOUS
Status: CANCELLED | OUTPATIENT
Start: 2022-07-12

## 2022-07-12 RX ORDER — CALCIUM GLUCONATE 94 MG/ML
1 INJECTION, SOLUTION INTRAVENOUS
Status: CANCELLED | OUTPATIENT
Start: 2022-07-12

## 2022-07-12 RX ORDER — MAGNESIUM SULFATE HEPTAHYDRATE 40 MG/ML
4 INJECTION, SOLUTION INTRAVENOUS
Status: CANCELLED | OUTPATIENT
Start: 2022-07-12

## 2022-07-12 RX ORDER — MAGNESIUM SULFATE HEPTAHYDRATE 40 MG/ML
2 INJECTION, SOLUTION INTRAVENOUS
Status: DISCONTINUED | OUTPATIENT
Start: 2022-07-12 | End: 2022-07-14 | Stop reason: HOSPADM

## 2022-07-12 RX ORDER — MAGNESIUM SULFATE HEPTAHYDRATE 40 MG/ML
4 INJECTION, SOLUTION INTRAVENOUS
Status: DISCONTINUED | OUTPATIENT
Start: 2022-07-12 | End: 2022-07-14 | Stop reason: HOSPADM

## 2022-07-12 RX ORDER — NIFEDIPINE 30 MG/1
60 TABLET, EXTENDED RELEASE ORAL DAILY
Status: DISCONTINUED | OUTPATIENT
Start: 2022-07-12 | End: 2022-07-13

## 2022-07-12 RX ORDER — SODIUM CHLORIDE, SODIUM LACTATE, POTASSIUM CHLORIDE, CALCIUM CHLORIDE 600; 310; 30; 20 MG/100ML; MG/100ML; MG/100ML; MG/100ML
10-125 INJECTION, SOLUTION INTRAVENOUS CONTINUOUS
Status: DISCONTINUED | OUTPATIENT
Start: 2022-07-12 | End: 2022-07-14 | Stop reason: HOSPADM

## 2022-07-12 RX ORDER — MAGNESIUM SULFATE IN WATER 40 MG/ML
2 INJECTION, SOLUTION INTRAVENOUS CONTINUOUS
Status: DISCONTINUED | OUTPATIENT
Start: 2022-07-12 | End: 2022-07-12

## 2022-07-12 RX ORDER — SODIUM CHLORIDE 9 MG/ML
INJECTION, SOLUTION INTRAVENOUS
Status: DISPENSED
Start: 2022-07-12 | End: 2022-07-12

## 2022-07-12 RX ORDER — MAGNESIUM SULFATE HEPTAHYDRATE 500 MG/ML
10 INJECTION, SOLUTION INTRAMUSCULAR; INTRAVENOUS
Status: DISCONTINUED | OUTPATIENT
Start: 2022-07-12 | End: 2022-07-14 | Stop reason: HOSPADM

## 2022-07-12 RX ORDER — MAGNESIUM SULFATE IN WATER 40 MG/ML
2 INJECTION, SOLUTION INTRAVENOUS CONTINUOUS
Status: CANCELLED | OUTPATIENT
Start: 2022-07-12 | End: 2022-07-17

## 2022-07-12 RX ADMIN — SENNOSIDES AND DOCUSATE SODIUM 1 TABLET: 8.6; 5 TABLET ORAL at 00:20

## 2022-07-12 RX ADMIN — IBUPROFEN 800 MG: 800 TABLET, FILM COATED ORAL at 16:47

## 2022-07-12 RX ADMIN — MAGNESIUM SULFATE HEPTAHYDRATE 2 G/HR: 40 INJECTION, SOLUTION INTRAVENOUS at 16:13

## 2022-07-12 RX ADMIN — IBUPROFEN 800 MG: 800 TABLET, FILM COATED ORAL at 10:35

## 2022-07-12 RX ADMIN — OXYCODONE HYDROCHLORIDE 5 MG: 5 TABLET ORAL at 22:59

## 2022-07-12 RX ADMIN — PRENATAL VITAMINS-IRON FUMARATE 27 MG IRON-FOLIC ACID 0.8 MG TABLET 1 TABLET: at 08:09

## 2022-07-12 RX ADMIN — ENOXAPARIN SODIUM 40 MG: 40 INJECTION SUBCUTANEOUS at 08:09

## 2022-07-12 RX ADMIN — ACETAMINOPHEN 975 MG: 325 TABLET, FILM COATED ORAL at 10:35

## 2022-07-12 RX ADMIN — DIPHENHYDRAMINE HYDROCHLORIDE 50 MG: 25 CAPSULE ORAL at 13:44

## 2022-07-12 RX ADMIN — SENNOSIDES AND DOCUSATE SODIUM 2 TABLET: 50; 8.6 TABLET ORAL at 20:34

## 2022-07-12 RX ADMIN — ACETAMINOPHEN 975 MG: 325 TABLET, FILM COATED ORAL at 04:14

## 2022-07-12 RX ADMIN — ACETAMINOPHEN 975 MG: 325 TABLET, FILM COATED ORAL at 22:59

## 2022-07-12 RX ADMIN — FLUTICASONE PROPIONATE 1 PUFF: 110 AEROSOL, METERED RESPIRATORY (INHALATION) at 20:34

## 2022-07-12 RX ADMIN — IBUPROFEN 800 MG: 800 TABLET, FILM COATED ORAL at 04:14

## 2022-07-12 RX ADMIN — IBUPROFEN 800 MG: 800 TABLET, FILM COATED ORAL at 22:59

## 2022-07-12 RX ADMIN — OXYCODONE HYDROCHLORIDE 5 MG: 5 TABLET ORAL at 16:47

## 2022-07-12 RX ADMIN — MAGNESIUM SULFATE HEPTAHYDRATE 4 G: 40 INJECTION, SOLUTION INTRAVENOUS at 15:38

## 2022-07-12 RX ADMIN — FLUTICASONE PROPIONATE 1 PUFF: 110 AEROSOL, METERED RESPIRATORY (INHALATION) at 08:09

## 2022-07-12 RX ADMIN — FLUTICASONE PROPIONATE 1 PUFF: 110 AEROSOL, METERED RESPIRATORY (INHALATION) at 00:19

## 2022-07-12 RX ADMIN — ACETAMINOPHEN 975 MG: 325 TABLET, FILM COATED ORAL at 16:46

## 2022-07-12 RX ADMIN — OXYCODONE HYDROCHLORIDE 5 MG: 5 TABLET ORAL at 10:36

## 2022-07-12 RX ADMIN — SODIUM CHLORIDE, POTASSIUM CHLORIDE, SODIUM LACTATE AND CALCIUM CHLORIDE 25 ML/HR: 600; 310; 30; 20 INJECTION, SOLUTION INTRAVENOUS at 15:39

## 2022-07-12 RX ADMIN — METOCLOPRAMIDE 10 MG: 10 TABLET ORAL at 13:44

## 2022-07-12 RX ADMIN — OXYCODONE HYDROCHLORIDE 5 MG: 5 TABLET ORAL at 04:14

## 2022-07-12 RX ADMIN — NIFEDIPINE 60 MG: 30 TABLET, FILM COATED, EXTENDED RELEASE ORAL at 08:07

## 2022-07-12 RX ADMIN — SENNOSIDES AND DOCUSATE SODIUM 2 TABLET: 50; 8.6 TABLET ORAL at 08:08

## 2022-07-12 ASSESSMENT — ACTIVITIES OF DAILY LIVING (ADL)
ADLS_ACUITY_SCORE: 18

## 2022-07-12 NOTE — PLAN OF CARE
Problem: Pain (Postpartum  Delivery)  Goal: Acceptable Pain Control  Outcome: Ongoing, Progressing  Intervention: Prevent or Manage Pain  Recent Flowsheet Documentation  Taken 2022 1350 by Melissa Gomez RN  Pain Management Interventions: medication (see MAR)  Taken 2022 1036 by Melissa Gomez, RN  Pain Management Interventions: medication (see MAR)   Goal Outcome Evaluation:  Patient will receive pain medications when available to report acceptable pain control.\    Data: Vital signs within normal limits. Postpartum checks within normal limits - see flow record. Patient eating and drinking normally. Patient able to empty bladder independently and is up ambulating. No apparent signs of infection. Incision healing well, patient removed tape and blisters are seen around top of incision but appear intact. Patient performing self cares and is able to care for infant.  Action: Patient medicated during the shift for pain and cramping. See MAR. Patient reassessed within 1 hour after each medication and pain was improved - patient stated she was comfortable. Patient education done about postpartum goals and education. See flow record.  Response: Positive attachment behaviors observed with infant. Support persons  present.   Plan: Anticipate discharge on tomorrow AM.

## 2022-07-12 NOTE — PROVIDER NOTIFICATION
07/12/22 0821   Provider Notification   Provider Name/Title Dr. Live, G2 Resident Dr Tarango   Method of Notification Electronic Page   Request Evaluate-Remote   Notification Reason Lab Results  (Hgb 6.3 this morning)

## 2022-07-12 NOTE — PROGRESS NOTES
[facility] Interval Postpartum Note     To patient's room to discuss sustained severe range blood pressures over 4h apart (today 7/12 and on 7/10) requiring Mg therapy for Superimposed Preeclampsia with Severe Features. Patient feeling fine. Denies headache, vision changes, chest pain or shortness of breath, nausea, vomiting, RUQ pain or worsening edema. Comfortable starting Mg, to continue 24h. Vitals reviewed, BP low mild range. Reflexes 2+ bilateral patellar without clonus. Lungs CTA, RRR on CV exam. IV Labetalol protocol ordered for sustained SR BP > 160/110. Plan repeat HELLP labs with next blood draw this evening.     Radha Spears MD   OB/GYN PGY-3  07/12/22 12:39 PM

## 2022-07-12 NOTE — PROVIDER NOTIFICATION
07/12/22 0725 07/12/22 0740   Vital Signs   Temp 98.2  F (36.8  C)  --    Temp src Oral  --    Resp 18  --    Pulse  --  98   Pulse Rate Source Monitor Monitor   BP (!) 160/92 (!) 158/87     Updated G2 on severe range BP.

## 2022-07-12 NOTE — PLAN OF CARE
Problem: Plan of Care - These are the overarching goals to be used throughout the patient stay.    Goal: Optimal Comfort and Wellbeing  Intervention: Monitor Pain and Promote Comfort  Recent Flowsheet Documentation  Taken 7/12/2022 1350 by Melissa Gomez RN  Pain Management Interventions: medication (see MAR)  Taken 7/12/2022 1036 by Melissa Gomez RN  Pain Management Interventions: medication (see MAR)  Intervention: Provide Person-Centered Care  Recent Flowsheet Documentation  Taken 7/12/2022 0740 by Melissa Gomez RN  Trust Relationship/Rapport:   care explained   choices provided   questions answered   questions encouraged   reassurance provided   thoughts/feelings acknowledged   Goal Outcome Evaluation:  Goal Outcome Evaluation:  Patient will receive pain medications when available to report acceptable pain control.     Data: Patient had severe range then high mild range BP this AM, MDs aware.  Due to severe range BP, she was started on Magnesium for superimposed pre-e with SF.  She continues on Mag 2g/hr + LR currently.  +1 reflexes and no clonus.  Received 1U PRBCs for critically low hemoglobin. Postpartum checks within normal limits - see flow record. Patient eating and drinking normally. Patient able to empty bladder independently and is up ambulating. No apparent signs of infection. Incision healing well, interdry placed in pannus area.  Pumping for infant in NICU.  Action: Patient medicated during the shift for pain and cramping. See MAR. Patient reassessed within 1 hour after each medication and pain was improved - patient stated she was comfortable. Patient education done about postpartum goals and education. See flow record.  Response: Recheck labs at 1800.  Getting puddles of colostrum for baby in NICU. Support persons sister present but left.  Plan: Anticipate discharge pending post Mag BP control.

## 2022-07-13 PROCEDURE — 120N000002 HC R&B MED SURG/OB UMMC

## 2022-07-13 PROCEDURE — 250N000011 HC RX IP 250 OP 636: Performed by: STUDENT IN AN ORGANIZED HEALTH CARE EDUCATION/TRAINING PROGRAM

## 2022-07-13 PROCEDURE — 258N000003 HC RX IP 258 OP 636: Performed by: STUDENT IN AN ORGANIZED HEALTH CARE EDUCATION/TRAINING PROGRAM

## 2022-07-13 PROCEDURE — 250N000011 HC RX IP 250 OP 636

## 2022-07-13 PROCEDURE — 250N000013 HC RX MED GY IP 250 OP 250 PS 637: Performed by: OBSTETRICS & GYNECOLOGY

## 2022-07-13 PROCEDURE — 250N000013 HC RX MED GY IP 250 OP 250 PS 637

## 2022-07-13 PROCEDURE — 250N000013 HC RX MED GY IP 250 OP 250 PS 637: Performed by: STUDENT IN AN ORGANIZED HEALTH CARE EDUCATION/TRAINING PROGRAM

## 2022-07-13 RX ORDER — NIFEDIPINE 30 MG/1
30 TABLET, EXTENDED RELEASE ORAL ONCE
Status: COMPLETED | OUTPATIENT
Start: 2022-07-13 | End: 2022-07-13

## 2022-07-13 RX ORDER — ENOXAPARIN SODIUM 100 MG/ML
40 INJECTION SUBCUTANEOUS EVERY 24 HOURS
Status: DISCONTINUED | OUTPATIENT
Start: 2022-07-13 | End: 2022-07-14 | Stop reason: HOSPADM

## 2022-07-13 RX ORDER — FUROSEMIDE 20 MG
20 TABLET ORAL ONCE
Status: COMPLETED | OUTPATIENT
Start: 2022-07-13 | End: 2022-07-13

## 2022-07-13 RX ORDER — NIFEDIPINE 30 MG/1
90 TABLET, EXTENDED RELEASE ORAL DAILY
Status: DISCONTINUED | OUTPATIENT
Start: 2022-07-14 | End: 2022-07-14 | Stop reason: HOSPADM

## 2022-07-13 RX ADMIN — ENOXAPARIN SODIUM 40 MG: 40 INJECTION SUBCUTANEOUS at 10:51

## 2022-07-13 RX ADMIN — OXYCODONE HYDROCHLORIDE 5 MG: 5 TABLET ORAL at 11:29

## 2022-07-13 RX ADMIN — ACETAMINOPHEN 650 MG: 325 TABLET ORAL at 11:29

## 2022-07-13 RX ADMIN — FLUTICASONE PROPIONATE 1 PUFF: 110 AEROSOL, METERED RESPIRATORY (INHALATION) at 19:47

## 2022-07-13 RX ADMIN — ACETAMINOPHEN 650 MG: 325 TABLET ORAL at 17:27

## 2022-07-13 RX ADMIN — IBUPROFEN 800 MG: 800 TABLET, FILM COATED ORAL at 23:52

## 2022-07-13 RX ADMIN — NIFEDIPINE 30 MG: 30 TABLET, FILM COATED, EXTENDED RELEASE ORAL at 22:32

## 2022-07-13 RX ADMIN — SODIUM CHLORIDE, POTASSIUM CHLORIDE, SODIUM LACTATE AND CALCIUM CHLORIDE 75 ML/HR: 600; 310; 30; 20 INJECTION, SOLUTION INTRAVENOUS at 05:22

## 2022-07-13 RX ADMIN — MAGNESIUM SULFATE HEPTAHYDRATE 2 G/HR: 40 INJECTION, SOLUTION INTRAVENOUS at 11:29

## 2022-07-13 RX ADMIN — FUROSEMIDE 20 MG: 20 TABLET ORAL at 16:37

## 2022-07-13 RX ADMIN — IBUPROFEN 800 MG: 800 TABLET, FILM COATED ORAL at 17:27

## 2022-07-13 RX ADMIN — NIFEDIPINE 60 MG: 30 TABLET, FILM COATED, EXTENDED RELEASE ORAL at 08:26

## 2022-07-13 RX ADMIN — IBUPROFEN 800 MG: 800 TABLET, FILM COATED ORAL at 11:29

## 2022-07-13 RX ADMIN — OXYCODONE HYDROCHLORIDE 5 MG: 5 TABLET ORAL at 23:52

## 2022-07-13 RX ADMIN — ACETAMINOPHEN 650 MG: 325 TABLET ORAL at 23:51

## 2022-07-13 RX ADMIN — IBUPROFEN 800 MG: 800 TABLET, FILM COATED ORAL at 05:08

## 2022-07-13 RX ADMIN — OXYCODONE HYDROCHLORIDE 5 MG: 5 TABLET ORAL at 17:27

## 2022-07-13 RX ADMIN — ACETAMINOPHEN 975 MG: 325 TABLET, FILM COATED ORAL at 05:08

## 2022-07-13 RX ADMIN — FLUTICASONE PROPIONATE 1 PUFF: 110 AEROSOL, METERED RESPIRATORY (INHALATION) at 08:58

## 2022-07-13 RX ADMIN — PRENATAL VITAMINS-IRON FUMARATE 27 MG IRON-FOLIC ACID 0.8 MG TABLET 1 TABLET: at 08:24

## 2022-07-13 RX ADMIN — OXYCODONE HYDROCHLORIDE 5 MG: 5 TABLET ORAL at 05:10

## 2022-07-13 ASSESSMENT — ACTIVITIES OF DAILY LIVING (ADL)
ADLS_ACUITY_SCORE: 18

## 2022-07-13 NOTE — PROGRESS NOTES
[facility]  Magnesium Check Note    S:   Patient is feeling well. Has been ambulating well. Eating and drinking appropriately. She denies headache, vision changes/spots in vision, chest pain, shortness of breath, RUQ or epigastric pain. States that she would like to discharge with POPs but is considering a Mirena.    O:  Patient Vitals for the past 4 hrs:   BP Temp Temp src Pulse Resp SpO2   22 0054 (!) 144/84 -- -- 102 -- --   22 2355 (!) 140/86 98.1  F (36.7  C) Oral 108 18 100 %     Gen: NAD, answers questions appropriately  CV:  RRR, no murmurs  Pulm:  CTAB, no wheezes or crackles, normal respiratory effort  Ext:  Patellar and brachioradialis reflexes 1+ b/l, absent clonus b/l, 1+ LE edema b/l    I/O:  I/O last 3 completed shifts:  In: 3974.59 [P.O.:3070; I.V.:362.92]  Out: 2450 [Urine:2450]     A/P:  Robinson Myers is a 21 year old  on PPD#3 s/p RCCS complicated by uterine rupture and superimposed preeclampsia.    Preeclampsia with severe features  - HELLP labs WNL, will repeat as needed  - Continue to monitor BP  - Mag sulfate for seizure prophylaxis: 2g/hr continuous   - Next clinical mag check at 0600  - Anticipate discharge to home on POD#4    Postpartum:  routine postpartum care    Anthony Reynolds MD  OBGYN PGY-3  2022 2:12 AM

## 2022-07-13 NOTE — PROGRESS NOTES
This writer consulted and collaborated with bedside nurses,  and NICU fellow.  Fellow reports he spoke at length with Robinson yesterday and she appears to understand the seriousness of Mikael's condition.  Fellow would like to wait until early next week for a sit down Care Conference as the MRI results will be available at that time. Robinson reports it was very comforting to speak with the Buddhist  and the knowledge he offered prayers for Spirit.  Robinson appears to be coping the best she can and at times has a flat affect and at other times seems to be in good spirits.  She has had three babies in the NICU and she is working to integrate how Spirit is doing compared to her other preemie babies.    This writer will continue to support family through their NICU journey.    Patricia HURTADO, MSW, [facility] HospitalSW  Maternal Child Health

## 2022-07-13 NOTE — PROGRESS NOTES
[facility]  Magnesium Check Note    S:   Patient is feeling well. Has been ambulating well. Eating and drinking appropriately. She reports feeling fatigued but denies headache, vision changes/spots in vision, chest pain, shortness of breath, RUQ or epigastric pain.    O:  Patient Vitals for the past 4 hrs:   BP Temp Temp src Pulse Resp SpO2   22 1930 137/87 -- -- 114 17 100 %   22 1830 132/80 98.4  F (36.9  C) Oral 102 18 100 %   22 1725 132/80 -- -- 86 18 97 %     Gen: NAD, answers questions appropriately  CV:  RRR, no murmurs  Pulm:  CTAB, no wheezes or crackles, normal respiratory effort  Ext:  Patellar and brachioradialis reflexes 1+ b/l, absent clonus b/l, 1+ LE edema b/l    I/O:  I/O last 3 completed shifts:  In: 2621.67 [P.O.:2080]  Out: 1650 [Urine:1650]     A/P:  Robinson Myers is a 21 year old  on PPD#2 s/p RCCS complicated by uterine rupture and superimposed preeclampsia.    Preeclampsia with severe features  - HELLP labs WNL, will repeat as needed  - Continue to monitor BP  - Mag sulfate for seizure prophylaxis: 2g/hr continuous   - Next clinical mag check at 0200  - Anticipate discharge to home on POD#3    Postpartum:  routine postpartum care    Caitlin Mcgovern MS3    Resident/Fellow Attestation   I, Sakina Gibson MD, was present with the medical/GRANT student who participated in the service and in the documentation of the note.  I have verified the history and personally performed the physical exam and medical decision making.  I agree with the assessment and plan of care as documented in the note.      Sakina Gibson MD  OB/GYN Resident, PGY-2

## 2022-07-13 NOTE — PROGRESS NOTES
"SPIRITUAL HEALTH SERVICES  SPIRITUAL ASSESSMENT Progress Note  [facility] ([facility] [place], [place]) NFCC       REFERRAL SOURCE: Text and call from unit  for support, Uatsdin specific.     DATA: Pt Robinson Myers identifies as Uatsdin and is of  descent.     I introduced myself to Robinson as the Lead Uatsdin  and oriented her to [facility].     Robinson stated that, \"I would like to take my Shahadah (proclamation of Lutheran) today\". I had not known that Robinson was not Uatsdin prior to my visit, and I did give her instruction and witness her Islamic proclamation of diamond.     I provided her some introductions to Lutheran and how to conduct the testimony's ritual bathing upon entry to Lutheran.     Robinson has received an Estonian/English copy of the Holy Quran. I also provided her with an Islamic prayer card with a Prophetic supplication. Robinson also received a new hijab as a gift from [facility].     PLAN: I will follow up with Robinson for the duration of her stay. [facility] is available to Robinson per request.     Noemi Horton  Lead Uatsdin   Pager [phone]    [facility] remains available 24/7 for emergent requests/referrals, either by having the switchboard page the on-call  or by entering an ASAP/STAT consult in Epic (this will also page the on-call ).    "

## 2022-07-13 NOTE — PLAN OF CARE
Goal Outcome Evaluation:  Data: BPs are mild to high mild range. Patient complains of facial swelling including eyelids and more significant swelling in BLEs. +1 brachial reflexes and no clonus noted.  Mag was stopped around 1525.  IV SL.  Postpartum checks within normal limits - see flow record. Patient eating and drinking normally. Patient able to empty bladder independently and is up ambulating. No apparent signs of infection. Incision healing well, LEOLA with liquid glue; given interdry yesterday. Patient performing self cares and pumping for baby.  Action: MD ordered one dose of PO Lasix 20mg.  Pumping with minimal assistance setting up parts and reminders on frequency. Patient medicated during the shift for pain and cramping. See MAR. Patient reassessed within 1 hour after each medication and pain was improved - patient stated she was comfortable. Patient education done about pump settings (maintenance vs initiate), pre-e signs/symptoms. See flow record.  Response: Getting 1-2oz of breastmilk for baby in NICU.  Tearful at times today, per SW baby likely with significant brain impairment. Support persons sister present.   Plan: Anticipate tomorrow pending BP control.

## 2022-07-13 NOTE — PLAN OF CARE
Patient c/o pain, taking Tylenol, Ibuprofen and Oxycodone at same time for pain control. Patient with ongoing Magnesium at 2g/hr and LRS at 75ml/ hr, infusing well. Denies headache, vision changes, SOB or RUQ pain. I and O monitored, weight this morning was 145.6 kg. Patient pumping for  in NICU, ambulating in room, voiding spontaneously and had a bowel movement this shift. Will continue with plan of care.

## 2022-07-13 NOTE — PROGRESS NOTES
[facility]  Progress Note    S:   Patient is feeling well. Has been ambulating well. Eating and drinking appropriately. Ambulating without lightheadedness or dizziness. Having BMs. She denies headache, vision changes/spots in vision, chest pain, shortness of breath, RUQ or epigastric pain. All other ROS negative.     O:  Patient Vitals for the past 4 hrs:   BP Temp Temp src Pulse Resp SpO2   22 0819 136/89 98.2  F (36.8  C) Oral 89 16 97 %     Vitals:    22 0054 22 0425 22 0433 22 0819   BP: (!) 144/84 137/85  136/89   BP Location: Left arm Left arm     Patient Position: Sitting Sitting     Cuff Size: Adult Large Adult Large     Pulse: 102 89  89   Resp:  17  16   Temp:  98.1  F (36.7  C)  98.2  F (36.8  C)   TempSrc:  Oral  Oral   SpO2:  100%  97%   Weight:   145.6 kg (320 lb 15.8 oz)        Gen: NAD, answers questions appropriately  CV:  RRR, no murmurs  Pulm:  CTAB, no wheezes or crackles, normal respiratory effort  Ext:  Patellar and brachioradialis reflexes 1+ b/l, absent clonus b/l, 1+ LE edema b/l  Incision: clean and dry    I/O:  I/O last 3 completed shifts:  In: 3974.59 [P.O.:3070; I.V.:362.92]  Out: 2450 [Urine:2450]     A/P:  Robinson Myers is a 21 year old  on PPD#4 s/p RCCS complicated by uterine rupture and superimposed preeclampsia.    Preeclampsia with severe features  - HELLP labs WNL, will repeat as needed  - Continue to monitor BP  - Mag sulfate for seizure prophylaxis: 2g/hr continuous   - Next clinical mag check at 1000  - Anticipate discharge to home on POD#4    Asthma  - Continue PTA albuterol     Chronic hypertension  - HELLP labs last wnl (7/10), will repeat this morning  - Blood pressures low mild to high mild overnigh overnight   - S/p 2D nifedipine ER 30 mg, D#2 60 mg daily     History of DVT  - Anticipate starting lovenox this morning  - Plan to continue for 6 weeks postpartum      Postpartum  - Encourage routine  Good Morning,    Mr. Cash Kath Calero, [facility]  [phone]   post-operative goals including ambulation and incentive spirometry  - PNC: Rh positive. Rubella unknown but titer pending. No intervention indicated.  - Pain: controlled on oral medications  - Heme: EBL 3142> 8.3 (introp)> 7.7> 7.6> 1u pRBC> 7.0>7.3>7.3>8.2.  Hemoglobin   Date Value Ref Range Status   07/12/2022 8.0 (L) 11.7 - 15.7 g/dL Final   01/07/2019 12.3 11.7 - 15.7 g/dL Final     Given <10 will discharge home with iron.   - GI: continue anti-emetics and stool softeners as needed.  - : S/p putnam, voiding spontaneously  - Infant: In NICU  - Feeding: Plans on pump and breast feed.  - BC: POPs vs Mirena at 6w     Postpartum:  routine postpartum care    Radha Spears MD   OB/GYN PGY-3  07/13/22 6:52 AM    [facility] staff:\    Appreciate note by Dr. Spears.  I have seen and examined the patient without the resident. I have reviewed, edited, and agree with the note.      Karina Wood MD, FACOG  7/13/2022  9:14 AM

## 2022-07-13 NOTE — PROVIDER NOTIFICATION
07/13/22 1730   Vital Signs   Temp 98.4  F (36.9  C)   Temp src Oral   Resp 20   Pulse 110   Pulse Rate Source Monitor   BP (!) 158/98   BP Location Left arm   Updated G2 on elevated BP and HR.

## 2022-07-13 NOTE — PROVIDER NOTIFICATION
07/13/22 0127   Provider Notification   Provider Name/Title Dr. Reynolds   Method of Notification Electronic Page   Request Evaluate-Remote   Notification Reason Vital Signs Change;Lab Results   FYI: Repeat Hgb was 8.0 at 18:51, BP: 140/86 at 23:58 and 144/84 at 00:54, pulse ranges 102-108, denies any symptoms.

## 2022-07-14 VITALS
RESPIRATION RATE: 20 BRPM | HEART RATE: 97 BPM | TEMPERATURE: 98.7 F | WEIGHT: 293 LBS | SYSTOLIC BLOOD PRESSURE: 149 MMHG | DIASTOLIC BLOOD PRESSURE: 84 MMHG | OXYGEN SATURATION: 100 % | BODY MASS INDEX: 51.73 KG/M2

## 2022-07-14 LAB
ALT SERPL W P-5'-P-CCNC: 23 U/L (ref 0–50)
AST SERPL W P-5'-P-CCNC: 17 U/L (ref 0–45)
CREAT SERPL-MCNC: 0.7 MG/DL (ref 0.52–1.04)
ERYTHROCYTE [DISTWIDTH] IN BLOOD BY AUTOMATED COUNT: 14.9 % (ref 10–15)
GFR SERPL CREATININE-BSD FRML MDRD: >90 ML/MIN/1.73M2
HCT VFR BLD AUTO: 23.5 % (ref 35–47)
HGB BLD-MCNC: 7.8 G/DL (ref 11.7–15.7)
MCH RBC QN AUTO: 28.4 PG (ref 26.5–33)
MCHC RBC AUTO-ENTMCNC: 33.2 G/DL (ref 31.5–36.5)
MCV RBC AUTO: 86 FL (ref 78–100)
PLATELET # BLD AUTO: 257 10E3/UL (ref 150–450)
RBC # BLD AUTO: 2.75 10E6/UL (ref 3.8–5.2)
WBC # BLD AUTO: 8.6 10E3/UL (ref 4–11)

## 2022-07-14 PROCEDURE — 250N000013 HC RX MED GY IP 250 OP 250 PS 637: Performed by: OBSTETRICS & GYNECOLOGY

## 2022-07-14 PROCEDURE — 84460 ALANINE AMINO (ALT) (SGPT): CPT | Performed by: STUDENT IN AN ORGANIZED HEALTH CARE EDUCATION/TRAINING PROGRAM

## 2022-07-14 PROCEDURE — 84450 TRANSFERASE (AST) (SGOT): CPT | Performed by: STUDENT IN AN ORGANIZED HEALTH CARE EDUCATION/TRAINING PROGRAM

## 2022-07-14 PROCEDURE — 250N000011 HC RX IP 250 OP 636

## 2022-07-14 PROCEDURE — 85027 COMPLETE CBC AUTOMATED: CPT | Performed by: STUDENT IN AN ORGANIZED HEALTH CARE EDUCATION/TRAINING PROGRAM

## 2022-07-14 PROCEDURE — 36415 COLL VENOUS BLD VENIPUNCTURE: CPT | Performed by: STUDENT IN AN ORGANIZED HEALTH CARE EDUCATION/TRAINING PROGRAM

## 2022-07-14 PROCEDURE — 82565 ASSAY OF CREATININE: CPT | Performed by: STUDENT IN AN ORGANIZED HEALTH CARE EDUCATION/TRAINING PROGRAM

## 2022-07-14 PROCEDURE — 250N000013 HC RX MED GY IP 250 OP 250 PS 637: Performed by: STUDENT IN AN ORGANIZED HEALTH CARE EDUCATION/TRAINING PROGRAM

## 2022-07-14 RX ORDER — ACETAMINOPHEN AND CODEINE PHOSPHATE 120; 12 MG/5ML; MG/5ML
0.35 SOLUTION ORAL DAILY
Qty: 90 TABLET | Refills: 3 | Status: SHIPPED | OUTPATIENT
Start: 2022-07-14 | End: 2022-07-14

## 2022-07-14 RX ORDER — HYDROCHLOROTHIAZIDE 25 MG/1
25 TABLET ORAL DAILY
Status: DISCONTINUED | OUTPATIENT
Start: 2022-07-14 | End: 2022-07-14 | Stop reason: HOSPADM

## 2022-07-14 RX ORDER — NIFEDIPINE 90 MG/1
90 TABLET, FILM COATED, EXTENDED RELEASE ORAL DAILY
Qty: 30 TABLET | Refills: 3 | Status: SHIPPED | OUTPATIENT
Start: 2022-07-15

## 2022-07-14 RX ORDER — ACETAMINOPHEN AND CODEINE PHOSPHATE 120; 12 MG/5ML; MG/5ML
0.35 SOLUTION ORAL DAILY
Qty: 90 TABLET | Refills: 3 | Status: SHIPPED | OUTPATIENT
Start: 2022-07-14

## 2022-07-14 RX ORDER — OXYCODONE HYDROCHLORIDE 5 MG/1
5 TABLET ORAL EVERY 4 HOURS PRN
Qty: 6 TABLET | Refills: 0 | Status: SHIPPED | OUTPATIENT
Start: 2022-07-14 | End: 2022-07-22

## 2022-07-14 RX ORDER — AMOXICILLIN 250 MG
1 CAPSULE ORAL DAILY
Qty: 30 TABLET | Refills: 0 | Status: SHIPPED | OUTPATIENT
Start: 2022-07-14

## 2022-07-14 RX ORDER — OXYCODONE HYDROCHLORIDE 5 MG/1
5 TABLET ORAL EVERY 6 HOURS PRN
Qty: 6 TABLET | Refills: 0 | Status: CANCELLED | OUTPATIENT
Start: 2022-07-14 | End: 2022-07-17

## 2022-07-14 RX ORDER — NIFEDIPINE 90 MG/1
90 TABLET, FILM COATED, EXTENDED RELEASE ORAL DAILY
Qty: 30 TABLET | Refills: 3 | Status: SHIPPED | OUTPATIENT
Start: 2022-07-15 | End: 2022-07-14

## 2022-07-14 RX ORDER — IBUPROFEN 600 MG/1
600 TABLET, FILM COATED ORAL EVERY 6 HOURS PRN
Qty: 60 TABLET | Refills: 0 | Status: SHIPPED | OUTPATIENT
Start: 2022-07-14

## 2022-07-14 RX ORDER — ACETAMINOPHEN 325 MG/1
650 TABLET ORAL EVERY 6 HOURS PRN
Qty: 100 TABLET | Refills: 0 | Status: SHIPPED | OUTPATIENT
Start: 2022-07-14 | End: 2022-07-14

## 2022-07-14 RX ORDER — IBUPROFEN 600 MG/1
600 TABLET, FILM COATED ORAL EVERY 6 HOURS PRN
Qty: 60 TABLET | Refills: 0 | Status: SHIPPED | OUTPATIENT
Start: 2022-07-14 | End: 2022-07-14

## 2022-07-14 RX ORDER — ACETAMINOPHEN 325 MG/1
650 TABLET ORAL EVERY 6 HOURS PRN
Qty: 100 TABLET | Refills: 0 | Status: SHIPPED | OUTPATIENT
Start: 2022-07-14

## 2022-07-14 RX ORDER — AMOXICILLIN 250 MG
1 CAPSULE ORAL DAILY
Qty: 100 TABLET | Refills: 0 | Status: SHIPPED | OUTPATIENT
Start: 2022-07-14 | End: 2022-07-14

## 2022-07-14 RX ORDER — ENOXAPARIN SODIUM 100 MG/ML
40 INJECTION SUBCUTANEOUS EVERY 12 HOURS
Qty: 33.6 ML | Refills: 0 | Status: SHIPPED | OUTPATIENT
Start: 2022-07-14 | End: 2022-08-25

## 2022-07-14 RX ADMIN — IBUPROFEN 800 MG: 800 TABLET, FILM COATED ORAL at 14:56

## 2022-07-14 RX ADMIN — OXYCODONE HYDROCHLORIDE 5 MG: 5 TABLET ORAL at 14:56

## 2022-07-14 RX ADMIN — HYDROCHLOROTHIAZIDE 25 MG: 25 TABLET ORAL at 09:47

## 2022-07-14 RX ADMIN — ACETAMINOPHEN 650 MG: 325 TABLET ORAL at 14:56

## 2022-07-14 RX ADMIN — FLUTICASONE PROPIONATE 1 PUFF: 110 AEROSOL, METERED RESPIRATORY (INHALATION) at 09:50

## 2022-07-14 RX ADMIN — PRENATAL VITAMINS-IRON FUMARATE 27 MG IRON-FOLIC ACID 0.8 MG TABLET 1 TABLET: at 07:57

## 2022-07-14 RX ADMIN — IBUPROFEN 800 MG: 800 TABLET, FILM COATED ORAL at 07:57

## 2022-07-14 RX ADMIN — OXYCODONE HYDROCHLORIDE 5 MG: 5 TABLET ORAL at 07:56

## 2022-07-14 RX ADMIN — NIFEDIPINE 90 MG: 30 TABLET, FILM COATED, EXTENDED RELEASE ORAL at 07:57

## 2022-07-14 RX ADMIN — ENOXAPARIN SODIUM 40 MG: 40 INJECTION SUBCUTANEOUS at 09:50

## 2022-07-14 RX ADMIN — ACETAMINOPHEN 650 MG: 325 TABLET ORAL at 07:57

## 2022-07-14 ASSESSMENT — ACTIVITIES OF DAILY LIVING (ADL)
ADLS_ACUITY_SCORE: 18

## 2022-07-14 NOTE — DISCHARGE INSTRUCTIONS
Preeclampsia   Call your doctor right away if you have any of the following:  - Edema (swelling) in your face or hands  - Rapid weight gain-about 1 pound or more in a day  - Headache  - Abdominal pain on your right side  - Vision problems (flashes or spots)  - You have questions or concerns once you return home.      Discharge Instructions for  Section ()  You had a  section, or . During the , your baby was delivered through an incision in your stomach and uterus. Full recovery after a  can take time. It s important to take care of yourself -- for your own sake and because your new baby needs you. Here are some guidelines to follow at home.  Incision care  Here's how to take care of your incision:  Shower as needed. Pat your incision dry.  Watch your incision for signs of infection, like more redness or drainage.  Hold a pillow against the incision when you laugh or cough and when you get up from a lying or sitting position.  Remember, it can take as long as 6 weeks for your incision to heal.  Activity  Here are some suggestions:  Don t try to take care of anyone other than your baby and yourself.  Remember, the more active you are, the more likely you are to have an increase in your bleeding.  Get lots of rest. Take naps in the afternoon.  Increase your activities bit by bit.  Plan your activities so that you don t have to go up or down stairs more than needed.  Do postsurgical deep breathing and coughing exercises. Ask your healthcare provider for instructions.  Don t lift anything heavier than your baby until your healthcare provider tells you it s OK.  Don t drive until your healthcare provider says it s OK.  Don t have sexual intercourse until after you ve had a checkup with your healthcare provider and you have decided on a birth control method.  Allow others to do things for you. Don't hesitate to ask for help.  Follow-up  Make a follow-up appointment as  directed by our staff.  When to call your healthcare provider  Call your healthcare provider right away if you have any of these:  Fever of 100.4 F (38 C) or higher  Redness, pain, or drainage at your incision site  Bleeding that requires a new sanitary pad every hour  Severe pain in the abdomen  Pain or urgency with urination  Foul odor from vaginal discharge  Trouble urinating or emptying your bladder  No bowel movement within 1 week after the birth of your baby  Swollen, red, painful area in the leg  Appearance of rash or hives  Sore, red, painful area on the breasts that may come with flu-like symptoms  Feelings of anxiety, panic, and/or depression  [employer] last reviewed this educational content on 2/1/2018 2000-2021 [employer]. All rights reserved. This information is not intended as a substitute for professional medical care. Always follow your healthcare professional's instructions.

## 2022-07-14 NOTE — PLAN OF CARE
BP improved after one time dose of 30 mg Procardia; on 90 mg dosage to start this AM. Up independently. Fundus firm at 2 cm below umbilicus. Lochia scant. Adequate I&Os. Passing flatus, declined stool softeners, had x2 loose BMs. Incisional pain managed with Oxycodone, Tylenol and Ibuprofen. Incision LEOLA, CDI. Breast pumps good amount of milk, sent to NICU each time. Continue plan of care.     Vitals:    07/13/22 1730 07/13/22 2229 07/14/22 0359 07/14/22 0650   BP: (!) 158/98 (!) 145/94 132/80    BP Location: Left arm Left arm     Patient Position:  Sitting     Cuff Size:  Adult Large     Pulse: 110 104 87    Resp: 20 18 17    Temp: 98.4  F (36.9  C) 98  F (36.7  C)     TempSrc: Oral Oral     SpO2:       Weight:    145.4 kg (320 lb 8 oz)

## 2022-07-14 NOTE — PROGRESS NOTES
This writer met with Robinson in her [facility] room to check-in and offer support.  Robinson was appropriately tearful and processing grief related to Spirit's poor prognosis.  Robinson reports she will discharge later today.  She needs to get home to care for her young children but is struggling to hold her grief while re-engaging as a mom.  Her fiance aDrrick is out of town for work and will return Sunday or Monday.  She provides updates but can't answer many of his questions.  Robinson understands Mikael is not responding to stimuli or light.  She reports the MRI on Monday will provide more information but it feels like a long time to wait.  Robinson reports she is afraid to leave Mikael alone and worries she will pass away and Robinson won't be here if she passes.  Robinson apologized for her tears and stated I know I need to get this out and honor my feelings.  This writer encouraged Robinson to continue to express her emotions and receive support from family and friends.  This writer explained the process of Supplemental Security which Robinson receives for her daughter Estefany and completed initial paperwork.  Robinson also completed a form for [employer].  Robinson reports deep gratitude that her daughter could be honored in this way.  Robinson reports she is ready to go home this evening if her blood pressures are stable.  This writer will continue to support this family through their NICU stay.    Patricia HIDALGOW, MSW, [facility]  Maternal Child Health

## 2022-07-14 NOTE — PROGRESS NOTES
[facility]  Progress Note    S:   Patient is feeling well. Has been ambulating well. Eating and drinking appropriately. Ambulating without lightheadedness or dizziness. Having BMs. She denies headache, vision changes/spots in vision, chest pain, shortness of breath, RUQ or epigastric pain. All other ROS negative. Very anxious about discharging to home too early and being without supportive care.    O:  Patient Vitals for the past 4 hrs:   BP Pulse Resp   22 0359 132/80 87 17     Vitals:    22 1300 22 1730 22 2229 22 0359   BP: (!) 144/76 (!) 158/98 (!) 145/94 132/80   BP Location: Left arm Left arm Left arm    Patient Position:   Sitting    Cuff Size:   Adult Large    Pulse: 89 110 104 87   Resp:  17   Temp: 98.4  F (36.9  C) 98.4  F (36.9  C) 98  F (36.7  C)    TempSrc: Oral Oral Oral    SpO2: 100%      Weight:           Gen: NAD, answers questions appropriately  CV:  RRR, no murmurs  Pulm:  CTAB, no wheezes or crackles, normal respiratory effort  Ext:  Patellar and brachioradialis reflexes 1+ b/l, absent clonus b/l, 1+ LE edema b/l  Incision: clean and dry    A/P:  Robinson SHADY Myers is a 21 year old  on PPD#4 s/p RCCS complicated by uterine rupture and superimposed preeclampsia.    Preeclampsia with severe features  - HELLP labs WNL last , will repeat this morning  - Continue to monitor BP  - S/p 24 hours of magnesium postpartum  - Currently on Nifedpine 90 mg daily. Will continue to monitor blood pressures throughout the day    Asthma  - Continue PTA albuterol     Chronic hypertension  - HELLP labs last wnl (), will repeat this morning  - Blood pressures low mild to high mild overnigh overnight   - S/p 2D nifedipine ER 30 mg, 2D 60 mg daily. D#2 90 mg     History of DVT  - Anticipate starting lovenox this morning  - Plan to continue for 6 weeks postpartum      Postpartum  - Encourage routine post-operative goals including ambulation  and incentive spirometry  - PNC: Rh positive. Rubella immune   - Pain: controlled on oral medications  - Heme: EBL 3142> 8.3 (introp)> 7.7> 7.6> 1u pRBC> 7.0>7.3>7.3>8.2>6.3>1u pRBC>8.0.    Given <10 will discharge home with iron.   - GI: continue anti-emetics and stool softeners as needed.  - : S/p putnam, voiding spontaneously  - Infant: In NICU  - Feeding: Plans on pump and breast feed.  - BC: POPs vs Mirena at 6w     Dispo: Possible PM discharge vs. Discharge to home tomorrow    Anthony Reynolds MD  OBGYN PGY-3  July 14, 2022 6:57 AM    BP (!) 143/90   Pulse 93   Temp 98.7  F (37.1  C) (Oral)   Resp 18   Wt 145.4 kg (320 lb 8 oz)   LMP 12/28/2021   SpO2 100%   Breastfeeding Unknown   BMI 51.73 kg/m      The patient was seen and examined by me separately from the team.  I have reviewed and agree with the above note.  She is doing well this morning, hoping to go home later today.  Just took first dose of hydrochlorothiazide, will monitor BP through the day will possible afternoon discharge and close outpatient follow up.  Reviewed plan for contraception-offered interval DepoProvera, she would like POP with eventual IUD.  Given her multiple uterine scars-would defer to closer to 12 weeks and consider ultrasound guidance for placement.      Delia Sanchez MD, FACOG

## 2022-07-15 ENCOUNTER — TELEPHONE (OUTPATIENT)
Dept: OBGYN | Facility: CLINIC | Age: 22
End: 2022-07-15

## 2022-07-15 ENCOUNTER — PATIENT OUTREACH (OUTPATIENT)
Dept: CARE COORDINATION | Facility: CLINIC | Age: 22
End: 2022-07-15

## 2022-07-15 DIAGNOSIS — Z71.89 OTHER SPECIFIED COUNSELING: ICD-10-CM

## 2022-07-15 NOTE — PLAN OF CARE
Pt discharge to home around 1930. Discharged instruction was done by ROBERT Torres. Paperwork signed by patient.

## 2022-07-15 NOTE — TELEPHONE ENCOUNTER
Health Call Center    Phone Message    May a detailed message be left on voicemail: yes     Reason for Call: Elliott from [facility] home care calling to let care team know they are not able to get to patients house for a follow up/incision check until Sunday 7/17/22. They spoke with patient and patient is ok with a visit on Sunday but would like to know if that's ok with care team. The orders placed were supposed to be within 24hrs. Please reach out to Elliott to discuss. Thank you    Action Taken: Message routed to:  Other: [facility]    Travel Screening: Not Applicable

## 2022-07-15 NOTE — PROGRESS NOTES
Clinic Care Coordination Contact  [facility]/Voicemail       Clinical Data: Care Coordinator Outreach  Outreach attempted x 1.  Left message on patient's voicemail with call back information and requested return call.  Plan: Care Coordinator will try to reach patient again in 1-2 business days.    Leigh Crane  Community Health Worker  [facility]  [phone]

## 2022-07-15 NOTE — TELEPHONE ENCOUNTER
Spoke with Elliott from [facility] health care regarding this patient.  Elliott wanted to know if it would be ok if the patient was seen on Sunday?  Elliott was under the impression from the order that the patient needed to be seen within 24 hours after discharge and would be unable to see her today.      When looking at the order it stated that [facility] would be in contact with the patient within 24 hours.  Looking at the discharge note from Dr. Thomas it states home health visit within 72 hours of discharge.    Instructed Elliott that Sunday would be ok to see the patient and the patient was in agreement with Elliott to be seen on Sunday.

## 2022-07-16 NOTE — PROGRESS NOTES
Clinic Care Coordination Contact  [facility]: Post-Discharge Note  SITUATION                                                      Admission:    Admission Date: 07/10/22   Reason for Admission: Intrauterine pregnancy at 27w5d  Fetal bradycardia  Concern for uterine rupture   History of CSx2 (CCS, LTCS)  Short interval pregnancy  H/o  deliveries   No prenatal care  Asthma    H/o PE , DVT   Discharge:   Discharge Date: 22  Discharge Diagnosis: Intrauterine pregnancy at 27w5d  Fetal bradycardia  Concern for uterine rupture   History of CSx2 (CCS, LTCS)  Short interval pregnancy  H/o  deliveries   No prenatal care  Asthma    H/o PE , DVT     BACKGROUND                                                      Per hospital discharge summary and inpatient provider notes:    Robinson Myers is a 21 year old  at 27w5d by LMP 22, here for evaluation of abdominal pain. Pregnancy is notable for h/o two prior CS (one classical and one LTCS), short interval pregnancy, h/o  deliveries, asthma, and no prenatal care in this pregnancy. She reported that pain began several hours ago, occurring every 5 minutes or so and feels like contractions. Denied any vaginal bleeding, was feeling normal fetal movements. Upon arrival, FHR initially noted to be in 120s but then became difficult to trace. BSUS brought into the room for assistance with getting fetal heart rate on monitor at which time FHR was noted to be in 60-70s. STAT CS immediately called, patient verbally consented for CS and blood transfusion as needed.     ASSESSMENT      Enrollment  Primary Care Care Coordination Status: Not a Candidate    Discharge Assessment  How are you doing now that you are home?: doing well  How are your symptoms? (Red Flag symptoms escalate to triage hotline per guidelines): Improved  Do you feel your condition is stable enough to be safe at home until your provider visit?: Yes  Does the  patient have their discharge instructions? : Yes  Does the patient have questions regarding their discharge instructions? : No  Were you started on any new medications or were there changes to any of your previous medications? : No  Does the patient have all of their medications?: Yes  Do you have questions regarding any of your medications? : No  Do you have all of your needed medical supplies or equipment (DME)?  (i.e. oxygen tank, CPAP, cane, etc.): Yes  Discharge follow-up appointment scheduled within 14 calendar days? : Yes  Discharge Follow Up Appointment Date: 07/18/22  Discharge Follow Up Appointment Scheduled with?: Primary Care Provider    Post-op (CH[facility] CTA Only)  If the patient had a surgery or procedure, do they have any questions for a nurse?: No         PLAN                                                      Outpatient Plan:      Follow up with primary OB for routine postpartum visit in 1,2, and6 weeks  Home health nursing will visit you within 72 hours     Future Appointments   Date Time Provider Department Center   7/18/2022  1:00 PM Nurse, Melquiades MAYBERRY         For any urgent concerns, please contact our 24 hour nurse triage line: [phone] ([phone])       EVONNE Lala  [phone]  [facility]

## 2022-07-18 ENCOUNTER — MEDICAL CORRESPONDENCE (OUTPATIENT)
Dept: FAMILY MEDICINE | Facility: CLINIC | Age: 22
End: 2022-07-18

## 2022-07-22 ENCOUNTER — HOSPITAL ENCOUNTER (EMERGENCY)
Facility: CLINIC | Age: 22
Discharge: HOME | End: 2022-07-22
Attending: EMERGENCY MEDICINE | Admitting: EMERGENCY MEDICINE
Payer: COMMERCIAL

## 2022-07-22 ENCOUNTER — APPOINTMENT (OUTPATIENT)
Dept: CT IMAGING | Facility: CLINIC | Age: 22
End: 2022-07-22
Attending: EMERGENCY MEDICINE
Payer: COMMERCIAL

## 2022-07-22 VITALS
SYSTOLIC BLOOD PRESSURE: 138 MMHG | HEART RATE: 68 BPM | DIASTOLIC BLOOD PRESSURE: 88 MMHG | BODY MASS INDEX: 51.65 KG/M2 | WEIGHT: 293 LBS | OXYGEN SATURATION: 100 % | RESPIRATION RATE: 16 BRPM | TEMPERATURE: 98.4 F

## 2022-07-22 DIAGNOSIS — G89.18 POSTOPERATIVE PAIN: ICD-10-CM

## 2022-07-22 DIAGNOSIS — Z98.891 S/P CESAREAN SECTION: ICD-10-CM

## 2022-07-22 LAB
ALBUMIN SERPL-MCNC: 2.9 G/DL (ref 3.4–5)
ALP SERPL-CCNC: 91 U/L (ref 40–150)
ALT SERPL W P-5'-P-CCNC: 16 U/L (ref 0–50)
ANION GAP SERPL CALCULATED.3IONS-SCNC: 5 MMOL/L (ref 3–14)
AST SERPL W P-5'-P-CCNC: 11 U/L (ref 0–45)
BASOPHILS # BLD AUTO: 0 10E3/UL (ref 0–0.2)
BASOPHILS NFR BLD AUTO: 0 %
BILIRUB SERPL-MCNC: 0.2 MG/DL (ref 0.2–1.3)
BUN SERPL-MCNC: 11 MG/DL (ref 7–30)
CALCIUM SERPL-MCNC: 8.8 MG/DL (ref 8.5–10.1)
CHLORIDE BLD-SCNC: 113 MMOL/L (ref 94–109)
CO2 SERPL-SCNC: 24 MMOL/L (ref 20–32)
CREAT SERPL-MCNC: 0.91 MG/DL (ref 0.52–1.04)
EOSINOPHIL # BLD AUTO: 0.2 10E3/UL (ref 0–0.7)
EOSINOPHIL NFR BLD AUTO: 2 %
ERYTHROCYTE [DISTWIDTH] IN BLOOD BY AUTOMATED COUNT: 14 % (ref 10–15)
GFR SERPL CREATININE-BSD FRML MDRD: 90 ML/MIN/1.73M2
GLUCOSE BLD-MCNC: 95 MG/DL (ref 70–99)
HCT VFR BLD AUTO: 26.6 % (ref 35–47)
HGB BLD-MCNC: 8.6 G/DL (ref 11.7–15.7)
IMM GRANULOCYTES # BLD: 0 10E3/UL
IMM GRANULOCYTES NFR BLD: 0 %
LIPASE SERPL-CCNC: 112 U/L (ref 73–393)
LYMPHOCYTES # BLD AUTO: 1.9 10E3/UL (ref 0.8–5.3)
LYMPHOCYTES NFR BLD AUTO: 21 %
MCH RBC QN AUTO: 27.5 PG (ref 26.5–33)
MCHC RBC AUTO-ENTMCNC: 32.3 G/DL (ref 31.5–36.5)
MCV RBC AUTO: 85 FL (ref 78–100)
MONOCYTES # BLD AUTO: 0.5 10E3/UL (ref 0–1.3)
MONOCYTES NFR BLD AUTO: 5 %
NEUTROPHILS # BLD AUTO: 6.4 10E3/UL (ref 1.6–8.3)
NEUTROPHILS NFR BLD AUTO: 72 %
NRBC # BLD AUTO: 0 10E3/UL
NRBC BLD AUTO-RTO: 0 /100
PLATELET # BLD AUTO: 350 10E3/UL (ref 150–450)
POTASSIUM BLD-SCNC: 4.2 MMOL/L (ref 3.4–5.3)
PROT SERPL-MCNC: 7.3 G/DL (ref 6.8–8.8)
RBC # BLD AUTO: 3.13 10E6/UL (ref 3.8–5.2)
SODIUM SERPL-SCNC: 142 MMOL/L (ref 133–144)
WBC # BLD AUTO: 9 10E3/UL (ref 4–11)

## 2022-07-22 PROCEDURE — 250N000011 HC RX IP 250 OP 636: Performed by: EMERGENCY MEDICINE

## 2022-07-22 PROCEDURE — 80053 COMPREHEN METABOLIC PANEL: CPT | Performed by: EMERGENCY MEDICINE

## 2022-07-22 PROCEDURE — 74177 CT ABD & PELVIS W/CONTRAST: CPT

## 2022-07-22 PROCEDURE — 250N000013 HC RX MED GY IP 250 OP 250 PS 637: Performed by: EMERGENCY MEDICINE

## 2022-07-22 PROCEDURE — 99284 EMERGENCY DEPT VISIT MOD MDM: CPT | Performed by: EMERGENCY MEDICINE

## 2022-07-22 PROCEDURE — 83690 ASSAY OF LIPASE: CPT | Performed by: EMERGENCY MEDICINE

## 2022-07-22 PROCEDURE — 85025 COMPLETE CBC W/AUTO DIFF WBC: CPT | Performed by: EMERGENCY MEDICINE

## 2022-07-22 PROCEDURE — 96360 HYDRATION IV INFUSION INIT: CPT | Mod: 59 | Performed by: EMERGENCY MEDICINE

## 2022-07-22 PROCEDURE — 99285 EMERGENCY DEPT VISIT HI MDM: CPT | Mod: 25 | Performed by: EMERGENCY MEDICINE

## 2022-07-22 PROCEDURE — 250N000009 HC RX 250: Performed by: EMERGENCY MEDICINE

## 2022-07-22 PROCEDURE — 36415 COLL VENOUS BLD VENIPUNCTURE: CPT | Performed by: EMERGENCY MEDICINE

## 2022-07-22 PROCEDURE — 258N000003 HC RX IP 258 OP 636: Performed by: EMERGENCY MEDICINE

## 2022-07-22 RX ORDER — OXYCODONE HYDROCHLORIDE 5 MG/1
5 TABLET ORAL EVERY 4 HOURS PRN
Qty: 12 TABLET | Refills: 0 | Status: SHIPPED | OUTPATIENT
Start: 2022-07-22

## 2022-07-22 RX ORDER — IOPAMIDOL 755 MG/ML
50 INJECTION, SOLUTION INTRAVASCULAR ONCE
Status: COMPLETED | OUTPATIENT
Start: 2022-07-22 | End: 2022-07-22

## 2022-07-22 RX ORDER — OXYCODONE HYDROCHLORIDE 5 MG/1
5 TABLET ORAL ONCE
Status: COMPLETED | OUTPATIENT
Start: 2022-07-22 | End: 2022-07-22

## 2022-07-22 RX ORDER — SODIUM CHLORIDE 9 MG/ML
INJECTION, SOLUTION INTRAVENOUS CONTINUOUS
Status: DISCONTINUED | OUTPATIENT
Start: 2022-07-22 | End: 2022-07-22 | Stop reason: HOSPADM

## 2022-07-22 RX ORDER — IOPAMIDOL 755 MG/ML
100 INJECTION, SOLUTION INTRAVASCULAR ONCE
Status: COMPLETED | OUTPATIENT
Start: 2022-07-22 | End: 2022-07-22

## 2022-07-22 RX ADMIN — IOPAMIDOL 100 ML: 755 INJECTION, SOLUTION INTRAVENOUS at 05:34

## 2022-07-22 RX ADMIN — SODIUM CHLORIDE 74 ML: 9 INJECTION, SOLUTION INTRAVENOUS at 05:37

## 2022-07-22 RX ADMIN — OXYCODONE HYDROCHLORIDE 5 MG: 5 TABLET ORAL at 03:25

## 2022-07-22 RX ADMIN — SODIUM CHLORIDE 1000 ML: 9 INJECTION, SOLUTION INTRAVENOUS at 03:22

## 2022-07-22 RX ADMIN — IOPAMIDOL 35 ML: 755 INJECTION, SOLUTION INTRAVENOUS at 05:37

## 2022-07-22 ASSESSMENT — ENCOUNTER SYMPTOMS
ABDOMINAL PAIN: 1
VOMITING: 0
SHORTNESS OF BREATH: 0
FEVER: 0
NAUSEA: 0

## 2022-07-22 NOTE — ED PROVIDER NOTES
ED Provider Note  [facility]      History     Chief Complaint   Patient presents with     Abdominal Pain     EMS reports pt had  on the  and was discharged on the . Noticed a lump on left lower quad today and hurts when touching-concerns about infection. VSS.     HPI  Robinson Myers is a 21 year old female who presents to us with a chief complaint of abdominal pain.  She was discharged from the hospital 5 days ago.  She had been admitted for uterine rupture and has had a .  She had also been diagnosed with preeclampsia which they did have difficulty controlling.  She reports very little pain over the last 5 days and has not taken any of the medications she was prescribed as she did not pick them up.  Today left-sided abdominal pain near her  incision increased.  She also reports concern of a lump or mass in this area.  No nausea or vomiting.  No fevers.  No trouble breathing or chest pain.    Past Medical History  Past Medical History:   Diagnosis Date     Asthma      HDL deficiency      Obesity      Past Surgical History:   Procedure Laterality Date      SECTION N/A 10/13/2021    Procedure:  SECTION;  Surgeon: Cortney Bell MD;  Location: [facility] L+[facility]      SECTION  2019    24 weeks, classical incision      SECTION N/A 7/10/2022    Procedure:  SECTION;  Surgeon: Leola Oden MD;  Location: [facility]     ibuprofen (ADVIL/MOTRIN) 600 MG tablet  oxyCODONE (ROXICODONE) 5 MG tablet  acetaminophen (TYLENOL) 325 MG tablet  enoxaparin ANTICOAGULANT (LOVENOX) 40 MG/0.4ML syringe  NIFEdipine ER (ADALAT CC) 90 MG 24 hr tablet  norethindrone (MICRONOR) 0.35 MG tablet  senna-docusate (SENOKOT-S/PERICOLACE) 8.6-50 MG tablet      No Known Allergies  Family History  Family History   Problem Relation Age of Onset     Family History Negative Mother         per patient     Social History   Social History     Tobacco Use      Smoking status: Former Smoker     Smokeless tobacco: Never Used   Substance Use Topics     Alcohol use: No     Drug use: No      Past medical history, past surgical history, medications, allergies, family history, and social history were reviewed with the patient. No additional pertinent items.       Review of Systems   Constitutional: Negative for fever.   Respiratory: Negative for shortness of breath.    Cardiovascular: Negative for chest pain.   Gastrointestinal: Positive for abdominal pain. Negative for nausea and vomiting.   All other systems reviewed and are negative.    A complete review of systems was performed with pertinent positives and negatives noted in the HPI, and all other systems negative.    Physical Exam   BP: 135/85  Pulse: 92  Temp: 98.8  F (37.1  C)  Resp: 16  Weight: 145.2 kg (320 lb) (stated)  SpO2: 100 %  Physical Exam  Constitutional:       General: She is not in acute distress.     Appearance: She is not diaphoretic.   HENT:      Head: Normocephalic and atraumatic.      Right Ear: External ear normal.      Left Ear: External ear normal.      Nose: Nose normal.   Eyes:      Extraocular Movements: Extraocular movements intact.      Conjunctiva/sclera: Conjunctivae normal.   Cardiovascular:      Rate and Rhythm: Normal rate and regular rhythm.      Heart sounds: Normal heart sounds.   Pulmonary:      Effort: Pulmonary effort is normal. No respiratory distress.      Breath sounds: Normal breath sounds.   Abdominal:      General: There is no distension.      Palpations: Abdomen is soft.      Tenderness: There is abdominal tenderness.      Comments: Left lower abdomen at the lateral aspect of her  incision.  Incision itself is clean dry and intact.   Musculoskeletal:         General: No swelling or deformity.      Cervical back: Normal range of motion and neck supple.   Skin:     General: Skin is warm and dry.   Neurological:      Mental Status: Mental status is at baseline.       Comments: Alert, oriented   Psychiatric:         Mood and Affect: Mood normal.         Behavior: Behavior normal.         ED Course      Procedures       Results for orders placed or performed during the hospital encounter of 22   CT Abdomen Pelvis w Contrast     Status: None    Narrative    EXAM: CT ABDOMEN PELVIS W CONTRAST  LOCATION: [facility]  DATE/TIME: 2022 5:21 AM    INDICATION: Prior uterine rupture and C section earlier this month, left sided abdominal pain  COMPARISON: 10/25/2021  TECHNIQUE: CT scan of the abdomen and pelvis was performed following injection of IV contrast. Multiplanar reformats were obtained. Dose reduction techniques were used.  CONTRAST: 135mL isovue 370    FINDINGS:   LOWER CHEST: Unremarkable    HEPATOBILIARY: No biliary dilatation    PANCREAS: Unremarkable    SPLEEN: Unremarkable    ADRENAL GLANDS: Normal.    KIDNEYS/BLADDER: No hydronephrosis. Bladder is mildly thick-walled and displaced to the right by prominent uterus.    BOWEL: No bowel obstruction.    LYMPH NODES: No significant retroperitoneal adenopathy    VASCULATURE: Prominent bilateral gonadal veins. No abdominal aortic aneurysm. Patent portal vein.    PELVIC ORGANS: Prominent uterus, with anterior  scar redemonstrated. Stranding adjacent to the anterior aspect of the uterus noted, without drainable fluid collections.    MUSCULOSKELETAL: Postoperative changes in the anterior pelvis. No drainable fluid collections.      Impression    IMPRESSION:   1.  Postoperative changes in the pelvis without fluid collections.   Comprehensive metabolic panel     Status: Abnormal   Result Value Ref Range    Sodium 142 133 - 144 mmol/L    Potassium 4.2 3.4 - 5.3 mmol/L    Chloride 113 (H) 94 - 109 mmol/L    Carbon Dioxide (CO2) 24 20 - 32 mmol/L    Anion Gap 5 3 - 14 mmol/L    Urea Nitrogen 11 7 - 30 mg/dL    Creatinine 0.91 0.52 - 1.04 mg/dL    Calcium 8.8 8.5 - 10.1 mg/dL     Glucose 95 70 - 99 mg/dL    Alkaline Phosphatase 91 40 - 150 U/L    AST 11 0 - 45 U/L    ALT 16 0 - 50 U/L    Protein Total 7.3 6.8 - 8.8 g/dL    Albumin 2.9 (L) 3.4 - 5.0 g/dL    Bilirubin Total 0.2 0.2 - 1.3 mg/dL    GFR Estimate 90 >60 mL/min/1.73m2   Lipase     Status: Normal   Result Value Ref Range    Lipase 112 73 - 393 U/L   CBC with platelets and differential     Status: Abnormal   Result Value Ref Range    WBC Count 9.0 4.0 - 11.0 10e3/uL    RBC Count 3.13 (L) 3.80 - 5.20 10e6/uL    Hemoglobin 8.6 (L) 11.7 - 15.7 g/dL    Hematocrit 26.6 (L) 35.0 - 47.0 %    MCV 85 78 - 100 fL    MCH 27.5 26.5 - 33.0 pg    MCHC 32.3 31.5 - 36.5 g/dL    RDW 14.0 10.0 - 15.0 %    Platelet Count 350 150 - 450 10e3/uL    % Neutrophils 72 %    % Lymphocytes 21 %    % Monocytes 5 %    % Eosinophils 2 %    % Basophils 0 %    % Immature Granulocytes 0 %    NRBCs per 100 WBC 0 <1 /100    Absolute Neutrophils 6.4 1.6 - 8.3 10e3/uL    Absolute Lymphocytes 1.9 0.8 - 5.3 10e3/uL    Absolute Monocytes 0.5 0.0 - 1.3 10e3/uL    Absolute Eosinophils 0.2 0.0 - 0.7 10e3/uL    Absolute Basophils 0.0 0.0 - 0.2 10e3/uL    Absolute Immature Granulocytes 0.0 <=0.4 10e3/uL    Absolute NRBCs 0.0 10e3/uL   CBC with platelets differential     Status: Abnormal    Narrative    The following orders were created for panel order CBC with platelets differential.  Procedure                               Abnormality         Status                     ---------                               -----------         ------                     CBC with platelets and d...[[id number]]  Abnormal            Final result                 Please view results for these tests on the individual orders.                   Results for orders placed or performed during the hospital encounter of 07/22/22   CT Abdomen Pelvis w Contrast     Status: None    Narrative    EXAM: CT ABDOMEN PELVIS W CONTRAST  LOCATION: [facility]  DATE/TIME:  2022 5:21 AM    INDICATION: Prior uterine rupture and C section earlier this month, left sided abdominal pain  COMPARISON: 10/25/2021  TECHNIQUE: CT scan of the abdomen and pelvis was performed following injection of IV contrast. Multiplanar reformats were obtained. Dose reduction techniques were used.  CONTRAST: 135mL isovue 370    FINDINGS:   LOWER CHEST: Unremarkable    HEPATOBILIARY: No biliary dilatation    PANCREAS: Unremarkable    SPLEEN: Unremarkable    ADRENAL GLANDS: Normal.    KIDNEYS/BLADDER: No hydronephrosis. Bladder is mildly thick-walled and displaced to the right by prominent uterus.    BOWEL: No bowel obstruction.    LYMPH NODES: No significant retroperitoneal adenopathy    VASCULATURE: Prominent bilateral gonadal veins. No abdominal aortic aneurysm. Patent portal vein.    PELVIC ORGANS: Prominent uterus, with anterior  scar redemonstrated. Stranding adjacent to the anterior aspect of the uterus noted, without drainable fluid collections.    MUSCULOSKELETAL: Postoperative changes in the anterior pelvis. No drainable fluid collections.      Impression    IMPRESSION:   1.  Postoperative changes in the pelvis without fluid collections.   Comprehensive metabolic panel     Status: Abnormal   Result Value Ref Range    Sodium 142 133 - 144 mmol/L    Potassium 4.2 3.4 - 5.3 mmol/L    Chloride 113 (H) 94 - 109 mmol/L    Carbon Dioxide (CO2) 24 20 - 32 mmol/L    Anion Gap 5 3 - 14 mmol/L    Urea Nitrogen 11 7 - 30 mg/dL    Creatinine 0.91 0.52 - 1.04 mg/dL    Calcium 8.8 8.5 - 10.1 mg/dL    Glucose 95 70 - 99 mg/dL    Alkaline Phosphatase 91 40 - 150 U/L    AST 11 0 - 45 U/L    ALT 16 0 - 50 U/L    Protein Total 7.3 6.8 - 8.8 g/dL    Albumin 2.9 (L) 3.4 - 5.0 g/dL    Bilirubin Total 0.2 0.2 - 1.3 mg/dL    GFR Estimate 90 >60 mL/min/1.73m2   Lipase     Status: Normal   Result Value Ref Range    Lipase 112 73 - 393 U/L   CBC with platelets and differential     Status: Abnormal   Result Value Ref  Range    WBC Count 9.0 4.0 - 11.0 10e3/uL    RBC Count 3.13 (L) 3.80 - 5.20 10e6/uL    Hemoglobin 8.6 (L) 11.7 - 15.7 g/dL    Hematocrit 26.6 (L) 35.0 - 47.0 %    MCV 85 78 - 100 fL    MCH 27.5 26.5 - 33.0 pg    MCHC 32.3 31.5 - 36.5 g/dL    RDW 14.0 10.0 - 15.0 %    Platelet Count 350 150 - 450 10e3/uL    % Neutrophils 72 %    % Lymphocytes 21 %    % Monocytes 5 %    % Eosinophils 2 %    % Basophils 0 %    % Immature Granulocytes 0 %    NRBCs per 100 WBC 0 <1 /100    Absolute Neutrophils 6.4 1.6 - 8.3 10e3/uL    Absolute Lymphocytes 1.9 0.8 - 5.3 10e3/uL    Absolute Monocytes 0.5 0.0 - 1.3 10e3/uL    Absolute Eosinophils 0.2 0.0 - 0.7 10e3/uL    Absolute Basophils 0.0 0.0 - 0.2 10e3/uL    Absolute Immature Granulocytes 0.0 <=0.4 10e3/uL    Absolute NRBCs 0.0 10e3/uL   CBC with platelets differential     Status: Abnormal    Narrative    The following orders were created for panel order CBC with platelets differential.  Procedure                               Abnormality         Status                     ---------                               -----------         ------                     CBC with platelets and d...[[id number]]  Abnormal            Final result                 Please view results for these tests on the individual orders.     Medications   0.9% sodium chloride BOLUS (0 mLs Intravenous Stopped 22 0407)     Followed by   sodium chloride 0.9% infusion (has no administration in time range)   oxyCODONE (ROXICODONE) tablet 5 mg (5 mg Oral Given 22 0325)   iopamidol (ISOVUE-370) solution 100 mL (100 mLs Intravenous Given 22 0534)   iopamidol (ISOVUE-370) solution 50 mL (35 mLs Intravenous Given 22 0537)   sodium chloride 0.9 % bag 100mL (74 mLs Intravenous Given 22 0537)        Assessments & Plan (with Medical Decision Making)   21-year-old female presents to us with a chief complaint of abdominal pain after a  complicated by uterine rupture as well as preeclampsia.  Vital  signs including blood pressure today are unremarkable.  Previous records were reviewed.  Labs today were interpreted and reassuring.  Hemoglobin is 8.6 which is stable from her discharge.  CMP is normal.  CT scan was interpreted and shows a small seroma but is otherwise unremarkable.  She is feeling much better with the oxycodone she was given.  She has not been able to fill the prescription that she was discharged with.  I will give her a new prescription and she will stay with her preemie who was admitted to the hospital here until the pharmacy opens at which point she can get it filled and go home.    I have reviewed the nursing notes. I have reviewed the findings, diagnosis, plan and need for follow up with the patient.    Current Discharge Medication List          Final diagnoses:   Postoperative pain       --  Gunnar Thompson  [facility] EMERGENCY DEPARTMENT  7/22/2022     Gunnar Thompson,   07/22/22 0559

## 2022-07-22 NOTE — ED NOTES
Bed: HW10UR  Expected date: 22  Expected time: 12:36 AM  Means of arrival: Ambulance  Comments:  [id number] TO TRIAGE  21F  Abd pain, s/p

## 2022-09-09 NOTE — PROGRESS NOTES
"SPIRITUAL HEALTH SERVICES  SPIRITUAL ASSESSMENT Progress Note  [facility] ([facility]) NICU 4       REFERRAL SOURCE: Unit  referral for visit, Faith specific.     DATA: Pt Robinson Myers identifies as Faith and is of  descent.     I introduced myself to Robinson as the Lead Faith  and oriented her to [facility].     She shared with me that, \"I named my daughter Mikael Mariscal\" which means spirit, the female servant of [employer]. Robinson stated that she chose this name because her  is of  and  descent.     Robinson entered the Sikhism of Religious a year ago after meeting her  who presented the Sikhism to her. She lives in [place] and her family is in attendance at the local [employer] there.     Robinson sheds tears when describing the events that lead to her rupture and complicated delivery of her daughter. Robinson is worried about possible brain damage in Spirit and as for me, \"Please go and pray for her there\".     I proceeded to the NICU to conduct prayers at bedside for Spirit. I also provided Robinson with a sealed bottle of Ananya water from [employer]. I offered to Robinson elements of reflective conversation and empathic listening throughout out encounter.     PLAN: I will follow up with Robinson for the duration of her stay. [facility] is available to Robinson per request.     Noemi Horton  Lead Faith   Pager [phone]    [facility] remains available 24/7 for emergent requests/referrals, either by having the switchboard page the on-call  or by entering an ASAP/STAT consult in Epic (this will also page the on-call ).    " negative

## 2022-09-17 ENCOUNTER — HEALTH MAINTENANCE LETTER (OUTPATIENT)
Age: 22
End: 2022-09-17

## 2023-03-25 ENCOUNTER — HOSPITAL ENCOUNTER (EMERGENCY)
Facility: CLINIC | Age: 23
Discharge: HOME | End: 2023-03-25
Attending: EMERGENCY MEDICINE | Admitting: EMERGENCY MEDICINE
Payer: COMMERCIAL

## 2023-03-25 ENCOUNTER — APPOINTMENT (OUTPATIENT)
Dept: ULTRASOUND IMAGING | Facility: CLINIC | Age: 23
End: 2023-03-25
Attending: EMERGENCY MEDICINE
Payer: COMMERCIAL

## 2023-03-25 VITALS
HEART RATE: 90 BPM | OXYGEN SATURATION: 100 % | BODY MASS INDEX: 47.09 KG/M2 | WEIGHT: 293 LBS | DIASTOLIC BLOOD PRESSURE: 98 MMHG | SYSTOLIC BLOOD PRESSURE: 141 MMHG | RESPIRATION RATE: 16 BRPM | TEMPERATURE: 98.3 F | HEIGHT: 66 IN

## 2023-03-25 DIAGNOSIS — O36.80X0 PREGNANCY OF UNKNOWN ANATOMIC LOCATION: ICD-10-CM

## 2023-03-25 DIAGNOSIS — O20.0 THREATENED MISCARRIAGE IN EARLY PREGNANCY: ICD-10-CM

## 2023-03-25 DIAGNOSIS — R07.9 CHEST PAIN, UNSPECIFIED TYPE: ICD-10-CM

## 2023-03-25 LAB
ALBUMIN UR-MCNC: 30 MG/DL
APPEARANCE UR: CLEAR
ATRIAL RATE - MUSE: 63 BPM
BILIRUB UR QL STRIP: NEGATIVE
COLOR UR AUTO: YELLOW
DIASTOLIC BLOOD PRESSURE - MUSE: NORMAL MMHG
GLUCOSE UR STRIP-MCNC: NEGATIVE MG/DL
HGB UR QL STRIP: NEGATIVE
INTERPRETATION ECG - MUSE: NORMAL
KETONES UR STRIP-MCNC: ABNORMAL MG/DL
LEUKOCYTE ESTERASE UR QL STRIP: NEGATIVE
MUCOUS THREADS #/AREA URNS LPF: PRESENT /LPF
NITRATE UR QL: NEGATIVE
P AXIS - MUSE: 65 DEGREES
PH UR STRIP: 5.5 [PH] (ref 5–7)
PR INTERVAL - MUSE: 186 MS
QRS DURATION - MUSE: 80 MS
QT - MUSE: 394 MS
QTC - MUSE: 403 MS
R AXIS - MUSE: 62 DEGREES
RBC URINE: 1 /HPF
SP GR UR STRIP: 1.03 (ref 1–1.03)
SQUAMOUS EPITHELIAL: 1 /HPF
SYSTOLIC BLOOD PRESSURE - MUSE: NORMAL MMHG
T AXIS - MUSE: 37 DEGREES
UROBILINOGEN UR STRIP-MCNC: 8 MG/DL
VENTRICULAR RATE- MUSE: 63 BPM
WBC URINE: 6 /HPF

## 2023-03-25 PROCEDURE — 93970 EXTREMITY STUDY: CPT

## 2023-03-25 PROCEDURE — 93005 ELECTROCARDIOGRAM TRACING: CPT

## 2023-03-25 PROCEDURE — 81003 URINALYSIS AUTO W/O SCOPE: CPT | Performed by: EMERGENCY MEDICINE

## 2023-03-25 PROCEDURE — 76801 OB US < 14 WKS SINGLE FETUS: CPT

## 2023-03-25 PROCEDURE — 99285 EMERGENCY DEPT VISIT HI MDM: CPT | Mod: 25

## 2023-03-25 ASSESSMENT — ENCOUNTER SYMPTOMS
CHILLS: 0
FEVER: 0
ABDOMINAL PAIN: 1
SHORTNESS OF BREATH: 1

## 2023-03-25 ASSESSMENT — ACTIVITIES OF DAILY LIVING (ADL)
ADLS_ACUITY_SCORE: 35
ADLS_ACUITY_SCORE: 35

## 2023-03-25 NOTE — DISCHARGE INSTRUCTIONS
*You may resume diet and activities.  *No new medications. Tylenol as directed as needed for pain.  *Call your OB/Gyn on Monday morning to make an appointment and get a repeat HCG level drawn on Monday.  *Return if you develop worsening pain, pulse ox persistently below 92%, resting heart rate persistently above 110, heavy vaginal bleeding, faint or feel like you will faint or become worse in any way.

## 2023-03-25 NOTE — ED PROVIDER NOTES
"  History     Chief Complaint:  Shortness of Breath       The history is provided by the patient.      Robinson Myers is a 22 year old female with a history of PE, psychosis and asthma who presents with chest pain, abdominal pain and shortness of breath, which onset suddenly this morning. She [place] [facility] [place] (lab results below), but presents to [place] due to \"not getting the correct care\". Robinson tested positive for pregnancy today (her 6th pregnancy), and has a history of PE during pregnancy (last in 2019). Since then, she has been placed on Lovenox during the entire duration of her pregnancies. Denies vaginal bleeding, fever, chills, leg swelling, calf pain. No history of ectopic pregnancy. Does not smoke tobacco.     Independent Historian:   None - Patient Only    Review of External Notes: Reviewed [facility] ED visit notes from earlier in the day.  Per the notes.  Patient had presented for chest pain, shortness of breath and abdominal pain with a known history of pulmonary embolism.      Evaluated her at bedside and discussed her feelings about the CT scan given she is pregnant. She was able to verbalize that she understands there is a risk of not getting the CT scan to rule out a blood clot. She does not want to expose the fetus to radiation. I informed her that it is our recommendation to obtain the CT scan to rule out a blood clot given she has a history of this and an elevated D-dimer here today. I informed her that the risk to the fetus from a radiation standpoint is relatively small and that precautions can be taken and that there is risk associated with not obtaining the CT scan. She was able to describe her understanding of the risk of not getting the CT scan including, but not limited to, death to herself or irreversible bodily harm to herself and/or the fetus, worsening of clot (if present), etc.     She had also previously answered yes to a few questions by RN on suicide screening earlier in " this encounter and we addressed this. She states that she does not have any suicidal ideations actively or any plan to commit suicide. She feels safe at home and does not have any interest in speaking to anyone about her mental health.     At this time, she is not providing any evidence of suicidality and demonstrates capacity to make decisions for herself. I further emphasized that our recommendations are to get a CT scan to rule out a blood clot and further testing regarding her pregnancy and she ultimately declined this.    Patient discharged home.     Lab results: 3/25/23    (ABNORMAL) ED CHEMISTRY LABS(NA,K,CL,CO2,GLU,CREAT,CA-IONIZED,ANION GAP) (03/25/2023 8:24 AM CDT)  Lab Results - (ABNORMAL) ED CHEMISTRY LABS(NA,K,CL,CO2,GLU,CREAT,CA-IONIZED,ANION GAP) (03/25/2023 8:24 AM CDT)  Component Value Ref Range Test Method Analysis Time Performed At Pathologist Signature   Sodium 139 135 - 148 mEq/L     [facility]     Chloride 107 92 - 108 mEq/L     [facility]     AnGap 11 8 - 16 mEq/L     [facility]     Glucose 113 (H) 70 - 100 mg/dL     [facility]     ICA, Actual 4.39 (L) 4.40 - 5.20 mg/dL     [facility]     ICA, pH Corrected 4.40 4.40 - 5.20 mg/dL     [facility]     Creatinine 0.92 0.50 - 1.00 mg/dL     [facility]     BICARB 21 (L) 22 - 26 mEq/L     [facility]     eGFR, High 101 >=60 ml/min/1.73m2     [facility]     Comment: Calculated using CKD-EPI equation   eGFR, Low 87 >=60 ml/min/1.73m2     [facility]     Comment: Calculated using CKD-EPI equation   Potassium 3.6 3.5 - 5.3 mEq/L     [facility]         (ABNORMAL) CBC WITH PLATELET (03/25/2023 8:24 AM CDT)  Lab Results - (ABNORMAL) CBC WITH PLATELET (03/25/2023 8:24 AM CDT)  Component Value Ref Range Test Method Analysis Time Performed At Pathologist Signature   WBC 12.87 (H) 4.00 - 10.00 k/cmm     [facility]     RBC 4.88 3.90 - 5.20 m/cmm     [facility]     Hgb 11.2 (L) 11.5 - 15.7 g/dL     [facility]     Hematocrit 36.2 34.0 - 45.0 %     [facility]     MCV 74.2 (L) 80.0 - 100.0 fL      "[facility]     MCH 23.0 (L) 25.0 - 32.0 pg     [facility]     MCHC 30.9 (L) 31.0 - 36.0 g/dL     [facility]     RDW 16.5 (H) 11.5 - 14.5 %     [facility]     Plt 347 150 - 400 k/cmm     [facility]     MPV 10.4 6.5 - 12.5 fL     [facility]      CXR: 3/25  Impression: Clear lungs. No acute abnormality.      ROS:  Review of Systems   Constitutional: Negative for chills and fever.   Respiratory: Positive for shortness of breath.    Cardiovascular: Positive for chest pain. Negative for leg swelling.   Gastrointestinal: Positive for abdominal pain.   Genitourinary: Negative for vaginal bleeding.   All other systems reviewed and are negative.    Allergies:  No Known Allergies     Medications:    NIFEdipine ER   norethindrone   oxyCODONE  senna-docusate     Past Medical History:    Asthma  PE  Elevated BMI  Conduct disorder  Uterine rupture   Hypertension  Premature delivery  Homeless  Sepsis  Clotting disorder  Psychosis     Past Surgical History:   C section (x3)     Social History:  Presents alone  PCP: [facility]     Physical Exam     Patient Vitals for the past 24 hrs:   BP Temp Temp src Pulse Resp SpO2 Height Weight   03/25/23 1634 (!) 141/98 98.3  F (36.8  C) Oral 90 16 100 % 1.676 m (5' 6\") 136.1 kg (300 lb)        Physical Exam  General: Well-nourished,appears to be resting comfortably when I enter the room  Eyes: PERRL, conjunctivae pink no scleral icterus or conjunctival injection  ENT:  Moist mucus membranes, posterior oropharynx clear without erythema or exudates  Respiratory:  Lungs clear to auscultation bilaterally, no crackles/rubs/wheezes.  Good air movement  CV: Normal rate and rhythm, no murmurs/rubs/gallops  GI:  Abdomen soft and non-distended.  Normoactive BS.  No tenderness, guarding or rebound  Skin: Warm, dry.  No rashes or petechiae  Musculoskeletal: No peripheral edema or calf tenderness.   Neuro: Alert and oriented to person/place/time  Psychiatric: Normal affect      Emergency Department " Course   EKG  ECG results from 03/25/23   EKG 12 lead     Value    Systolic Blood Pressure     Diastolic Blood Pressure     Ventricular Rate 63    Atrial Rate 63    ND Interval 186    QRS Duration 80        QTc 403    P Axis 65    R AXIS 62    T Axis 37    Interpretation ECG      Sinus rhythm  Normal ECG  No previous ECGs available  Confirmed by GENERATED REPORT, COMPUTER ([id number]),  EMILYFLO GLADIS ([id number]) on 3/25/2023 6:40:37 PM         Imaging:  US OB <14 Weeks W Transvaginal   Final Result   IMPRESSION:    1.  Fluid-filled structure in the endometrial canal, either gestational sac or pseudogestational sac. No fetal pole identified.      2.  Since intrauterine gestation is not confirmed, ectopic pregnancy is not excluded. Consider serial beta hCG and/or follow-up ultrasound as clinically warranted.            US Lower Extremity Venous Duplex Bilateral   Final Result   IMPRESSION:   1.  No evidence of deep venous thrombosis in the bilateral lower extremities.         Report per radiology    Laboratory:  Labs Ordered and Resulted from Time of ED Arrival to Time of ED Departure   ROUTINE UA WITH MICROSCOPIC REFLEX TO CULTURE - Abnormal       Result Value    Color Urine Yellow      Appearance Urine Clear      Glucose Urine Negative      Bilirubin Urine Negative      Ketones Urine Trace (*)     Specific Gravity Urine 1.033      Blood Urine Negative      pH Urine 5.5      Protein Albumin Urine 30 (*)     Urobilinogen Urine 8.0 (*)     Nitrite Urine Negative      Leukocyte Esterase Urine Negative      Mucus Urine Present (*)     RBC Urine 1      WBC Urine 6 (*)     Squamous Epithelials Urine 1          Emergency Department Course & Assessments:    Independent Interpretation (X-rays, CTs, rhythm strip):  I reviewed and interpreted all the data from her visit to [facility] earlier this morning.  Her D-dimer was elevated at 888 (reference range 0-2 29).  Her quantitative beta hCG level was 483.   CBC showed a mild leukocytosis with white blood cell count 12.9 and a mild anemia  with hemoglobin of 11.2.  BMP and CMP were essentially normal.  Lipase was not elevated.  High-sensitivity troponin was normal.  Over 2-hour high-sensitivity troponin was obtained and delta was normal.  Chest x-ray was read by radiology as clear.    Assessments/Consultations/Discussion of Management or Tests:  ED Course as of 23 2304   Sat Mar 25, 2023   1710 Obtained history and examined.    1842 Rechecked.      Social Determinants of Health affecting care:   None    Disposition:  The patient was discharged to home.     Impression & Plan      CMS Diagnoses: None    Medical Decision Making:  Robinson Myers is a 22 year old female is seen for an episode of chest pain and shortness of breath with some abdominal pain earlier in the day.  Her troponin x2 was negative.  Chest x-ray was clear.  Her D-dimer was slightly elevated.  There was no signs of significant abnormality on the remainder of her laboratory studies but she was found to be incidentally pregnant with unknown certain dates.  hCG level was fairly low, however, given the pregnancy, the patient did not wish to have a CT scan done earlier in the day.  She is feeling better now.  She is not hypoxic or tachycardic.  She reports that in the past she had been started on Lovenox during her pregnancies because she had a postpartum blood clot after a .  She initially requested to be started on the Lovenox.  After further discussion, we elected to obtain Doppler ultrasounds of her legs as well as an OB ultrasound.  The OB ultrasound shows what is likely a gestational sac but this is not diagnostic as there is no fetal pole or yolk sac.  I explained to her that this could represent an ectopic pregnancy since we cannot say with certainty that the pregnancy is in her uterus.  She does not have any unilateral abdominal pain but she certainly needs follow-up with OB/GYN  for a repeat hCG level in 48 hours and a likely repeat ultrasound in a week's time.  She feels confident that she can call on Monday to get the repeat hCG level drawn and to establish care with her OB/GYN at [facility].  The Doppler ultrasounds fortunately showed no evidence of DVT in the legs.  I explained to her that it still possible she could have a pulmonary embolism.  After discussion of the risks and benefits, she did decline a CT again here.  I think this is acceptable as she is hemodynamically stable, troponin was negative, she had no signs of congestive heart failure and there is no signs of lower extremity DVTs.  I did give her a pulse oximeter to take home and precautions to return if she becomes tachycardic or hypoxic or if any new or worsening symptoms.  She feels comfortable with the plan.  She understands the symptoms that would concern us for ectopic pregnancy and she will return if she develops these.  We will hold off on starting Lovenox at this time as the pregnancy is in an unknown location and blood thinning medications would be high risk if concern for ectopic pregnancy.    Diagnosis:    ICD-10-CM    1. Threatened miscarriage in early pregnancy  O20.0       2. Pregnancy of unknown anatomic location  O36.80X0       3. Chest pain, unspecified type  R07.9            Discharge Medications:  Discharge Medication List as of 3/25/2023  7:12 PM         Scribe Disclosure:  I, MADISYN AYALA, am serving as a scribe at 5:31 PM on 3/25/2023 to document services personally performed by Patricia Sexton MD based on my observations and the provider's statements to me.   3/25/2023   Patricia Sexton MD Cho, Amy C, MD  03/25/23 5899

## 2023-03-25 NOTE — ED TRIAGE NOTES
"Patient was seen at [facility] earlier today, after she presented to their ED for shortness of breath. Per patient, they did imaging (XR) of her chest and told her she has a PE. Pt states they kept drawing blood incorrectly. Reports a positive pregnancy test as of today. Patient was told she has small clot on her lungs, but heart was okay. Patient has not been on Lovenox since loss of her last child/pregancy 7/10/2022. Patient hoping to be given Lovenox and discharged, but states [facility] did not provide her with this option and wanted to do more imaging prior to providing treatment. Patient left [facility] AMA. \"Came to [facility] for real care.\"  Pt requested  physician and to have \"a lead nurse\" be in charge of her care. Explained to patient that we will aim to provide quality care to her while she is here. Patient appeared to be agitated but then calmed down and accepted to having whichever staff was available to provide her adequate care today.  "

## 2023-06-04 ENCOUNTER — HEALTH MAINTENANCE LETTER (OUTPATIENT)
Age: 23
End: 2023-06-04

## 2024-07-08 NOTE — ADDENDUM NOTE
Addendum  created 07/10/22 1043 by Andreea Loomis MD    Attestation recorded in Intraprocedure, Flowsheet accepted, Intraprocedure Attestations filed       Follow-up biopsy results in 2 weeks.  Benefiber 1 tablespoonful orally once daily or Fiber Choice Gummies 2-3 Gummies once daily.

## 2024-07-13 ENCOUNTER — HEALTH MAINTENANCE LETTER (OUTPATIENT)
Age: 24
End: 2024-07-13

## 2024-11-19 NOTE — PROGRESS NOTES
"Post Partum Progress Note  PPD#2    Subjective:  Patient is feeling well this morning. She has not complaints. Pain is improving and well controlled on current medication regimen. She is tolerating PO intake. Lochia present and minimal.  She is voiding without difficulty. She has passed flatus and has not had a BM. She is ambulating without dizziness or difficulty.  She denies headache, changes in vision, nausea/vomiting, chest pain, shortness of breath, RUQ pain, or worsening edema.  Plans to pump and breast feed.    Objective:  Vital signs:  Temp: 98.3  F (36.8  C) Temp src: Oral BP: (!) 152/92 Pulse: 99   Resp: 18 SpO2: 100 % O2 Device: None (Room air) Oxygen Delivery: 2 LPM   Weight: 146.1 kg (322 lb 1.6 oz)  Estimated body mass index is 51.99 kg/m  as calculated from the following:    Height as of 2/15/22: 1.676 m (5' 6\").    Weight as of this encounter: 146.1 kg (322 lb 1.6 oz).    General: NAD, resting comfortably  CV: Regular rate and rhythm, well perfused.   Pulm: Normal respiratory effort, CTABL  Abd: Soft, non-tender, non-distended. Fundus is firm and 4 cm below the umbilicus.    Incision: incision is clean, dry, intact with sterile bandage in place  Ext: 1+ lower extremity edema bilaterally. No calf tenderness.    Assessment/Plan:  Robinson Myers is a 21 year old  female who is POD#2 s/p RCCS complicated by uterine rupture. Doing well postpartum    # Asthma  - Continue PTA albuterol    # Chronic hypertension  - HELLP labs last wnl (7/10), will repeat this morning  - Blood pressures low mild to high mild overnigh overnight   - S/p 2D nifedipine ER 30 mg, D#1 60 mg daily    # History of DVT  - Anticipate starting lovenox this morning  - Plan to continue for 6 weeks postpartum     # Postpartum:  - Encourage routine post-operative goals including ambulation and incentive spirometry  - PNC: Rh positive. Rubella unknown but titer pending. No intervention indicated.  - Pain: controlled on oral " Pt arrived via triage for adominal cramping and vaginal bleeding  Pt states symptoms started at 10 am  Pt 15 weeks pregnant   Pt denies cp/ denies  sob  No trauma to pt  RR even and unlabored  Call light in reach   medications  - Heme: EBL 3142> 8.3 (introp)> 7.7> 7.6> 1u pRBC> 7.0>7.3>7.3>8.2.  Given <10 will discharge home with iron.   - GI: continue anti-emetics and stool softeners as needed.  - : S/p putnam, voiding spontaneously  - Infant: In NICU  - Feeding: Plans on pump and breast feed.  - BC: Undecided. Need to discuss today in the setting of uterine rupture x2    Anticipate discharge to home on POD#3 if BP controlled    Anthony Reynolds MD  OBGYN PGY-3  July 12, 2022 6:07 AM      Appreciate Dr. Reynolds's note above, patient also seen and examined by me. I agree with the note above.  I discussed birth control options with patient.  Discussed that she is a candidate for progesterone only forms and non-hormonal methods.  She did not have a lot of questions and remains undecided, but is willing to discuss again.  Geetha Live MD

## 2025-07-19 ENCOUNTER — HEALTH MAINTENANCE LETTER (OUTPATIENT)
Age: 25
End: 2025-07-19

## (undated) DEVICE — SOL WATER IRRIG 1000ML BOTTLE 07139-09

## (undated) DEVICE — ADH SKIN CLOSURE PREMIERPRO EXOFIN 1.0ML 3470

## (undated) DEVICE — SU MONOCRYL 0 CT-1 36" Y346H

## (undated) DEVICE — PACK C-SECTION LF PL15OTA83B

## (undated) DEVICE — SU VICRYL 0 CT-1 36" J346H

## (undated) DEVICE — GLOVE PROTEXIS BLUE W/NEU-THERA 6.5  2D73EB65

## (undated) DEVICE — SU PDS II 0 CTX 60" Z990G

## (undated) DEVICE — STOCKING SLEEVE COMPRESSION CALF LG

## (undated) DEVICE — DRSG ABDOMINAL 07 1/2X8" 7197D

## (undated) DEVICE — CATH TRAY FOLEY 16FR BARDEX W/DRAIN BAG STATLOCK 300316A

## (undated) DEVICE — STRAP KNEE/BODY 31143004

## (undated) DEVICE — GLOVE ESTEEM POWDER FREE SMT 6.0  2D72PT60

## (undated) DEVICE — CATH TRAY FOLEY SURESTEP 16FR WDRAIN BAG STLK LATEX A300316A

## (undated) DEVICE — GLOVE PROTEXIS BLUE W/NEU-THERA 7.0  2D73EB70

## (undated) DEVICE — SOL NACL 0.9% IRRIG 1000ML BOTTLE 07138-09

## (undated) DEVICE — DRSG STERI STRIP 1/4X3" R1541

## (undated) DEVICE — PREP CHLORAPREP 26ML TINTED ORANGE  260815

## (undated) DEVICE — DRAPE SETUP C-SECTION INVISISHIELD 54X90" DYNJE5600

## (undated) DEVICE — SU VICRYL 4-0 PS-2 18" UND J496G

## (undated) DEVICE — ESU GROUND PAD UNIVERSAL W/O CORD

## (undated) DEVICE — GLOVE ESTEEM POWDER FREE SMT 6.5  2D72PT65

## (undated) RX ORDER — ACETAMINOPHEN 325 MG/1
TABLET ORAL
Status: DISPENSED
Start: 2022-07-10

## (undated) RX ORDER — HYDROMORPHONE HCL IN WATER/PF 6 MG/30 ML
PATIENT CONTROLLED ANALGESIA SYRINGE INTRAVENOUS
Status: DISPENSED
Start: 2022-07-10

## (undated) RX ORDER — HYDROMORPHONE HCL IN WATER/PF 6 MG/30 ML
PATIENT CONTROLLED ANALGESIA SYRINGE INTRAVENOUS
Status: DISPENSED
Start: 2021-10-13

## (undated) RX ORDER — FENTANYL CITRATE 50 UG/ML
INJECTION, SOLUTION INTRAMUSCULAR; INTRAVENOUS
Status: DISPENSED
Start: 2021-10-13

## (undated) RX ORDER — FENTANYL CITRATE 50 UG/ML
INJECTION, SOLUTION INTRAMUSCULAR; INTRAVENOUS
Status: DISPENSED
Start: 2022-07-10

## (undated) RX ORDER — KETOROLAC TROMETHAMINE 30 MG/ML
INJECTION, SOLUTION INTRAMUSCULAR; INTRAVENOUS
Status: DISPENSED
Start: 2021-10-13